# Patient Record
Sex: MALE | Race: WHITE | NOT HISPANIC OR LATINO | Employment: OTHER | URBAN - METROPOLITAN AREA
[De-identification: names, ages, dates, MRNs, and addresses within clinical notes are randomized per-mention and may not be internally consistent; named-entity substitution may affect disease eponyms.]

---

## 2018-12-22 ENCOUNTER — OFFICE VISIT (OUTPATIENT)
Dept: URGENT CARE | Facility: CLINIC | Age: 73
End: 2018-12-22
Payer: MEDICARE

## 2018-12-22 VITALS
HEIGHT: 66 IN | RESPIRATION RATE: 18 BRPM | SYSTOLIC BLOOD PRESSURE: 160 MMHG | TEMPERATURE: 97 F | DIASTOLIC BLOOD PRESSURE: 89 MMHG | HEART RATE: 85 BPM | OXYGEN SATURATION: 97 % | BODY MASS INDEX: 24.91 KG/M2 | WEIGHT: 155 LBS

## 2018-12-22 DIAGNOSIS — J30.9 ALLERGIC RHINITIS, UNSPECIFIED SEASONALITY, UNSPECIFIED TRIGGER: Primary | ICD-10-CM

## 2018-12-22 DIAGNOSIS — J06.9 VIRAL URI WITH COUGH: ICD-10-CM

## 2018-12-22 PROCEDURE — 99203 OFFICE O/P NEW LOW 30 MIN: CPT | Mod: S$GLB,,, | Performed by: NURSE PRACTITIONER

## 2018-12-22 RX ORDER — AMLODIPINE BESYLATE 10 MG/1
TABLET ORAL
COMMUNITY
End: 2020-03-05

## 2018-12-22 RX ORDER — IRBESARTAN 300 MG/1
300 TABLET ORAL DAILY
COMMUNITY
End: 2020-03-05

## 2018-12-22 RX ORDER — FLUTICASONE PROPIONATE 50 MCG
1 SPRAY, SUSPENSION (ML) NASAL DAILY
Qty: 9.9 ML | Refills: 0 | Status: SHIPPED | OUTPATIENT
Start: 2018-12-22 | End: 2022-01-14

## 2018-12-22 RX ORDER — ROSUVASTATIN CALCIUM 10 MG/1
10 TABLET, COATED ORAL DAILY
COMMUNITY
End: 2020-03-05

## 2018-12-22 RX ORDER — TAMSULOSIN HYDROCHLORIDE 0.4 MG/1
CAPSULE ORAL
COMMUNITY
End: 2020-03-05

## 2018-12-22 RX ORDER — FINASTERIDE 5 MG/1
TABLET, FILM COATED ORAL
COMMUNITY
End: 2020-03-05

## 2018-12-22 NOTE — PROGRESS NOTES
"Subjective:       Patient ID: Turner Fernandes is a 73 y.o. male.    Vitals:  height is 5' 6" (1.676 m) and weight is 70.3 kg (155 lb). His temperature is 97 °F (36.1 °C). His blood pressure is 160/89 (abnormal) and his pulse is 85. His respiration is 18 and oxygen saturation is 97%.     Chief Complaint: URI    Pt reports scratchy throat in the morning, pt lots of congestion.       URI    This is a new problem. The current episode started more than 1 month ago. There has been no fever. Associated symptoms include congestion and coughing. Pertinent negatives include no ear pain, nausea, rash, sinus pain, sore throat, vomiting or wheezing. He has tried nothing for the symptoms.       Constitution: Negative for chills, sweating, fatigue and fever.   HENT: Positive for congestion. Negative for ear pain, sinus pain, sinus pressure, sore throat and voice change.    Neck: Negative for painful lymph nodes.   Eyes: Negative for eye redness.   Respiratory: Positive for cough. Negative for chest tightness, sputum production, bloody sputum, COPD, shortness of breath, stridor, wheezing and asthma.    Gastrointestinal: Negative for nausea and vomiting.   Musculoskeletal: Negative for muscle ache.   Skin: Negative for rash.   Allergic/Immunologic: Negative for seasonal allergies and asthma.   Hematologic/Lymphatic: Negative for swollen lymph nodes.       Objective:      Physical Exam   Constitutional: He is oriented to person, place, and time. He appears well-developed and well-nourished. He is cooperative.  Non-toxic appearance. He does not appear ill. No distress.   HENT:   Head: Normocephalic and atraumatic.   Right Ear: Hearing, tympanic membrane, external ear and ear canal normal.   Left Ear: Hearing, tympanic membrane, external ear and ear canal normal.   Nose: Mucosal edema and rhinorrhea present. No nasal deformity. No epistaxis. Right sinus exhibits no maxillary sinus tenderness and no frontal sinus tenderness. Left sinus " exhibits no maxillary sinus tenderness and no frontal sinus tenderness.   Mouth/Throat: Uvula is midline, oropharynx is clear and moist and mucous membranes are normal. No trismus in the jaw. Normal dentition. No uvula swelling. No posterior oropharyngeal erythema.   Eyes: Conjunctivae and lids are normal. No scleral icterus.   Sclera clear bilat   Neck: Trachea normal, full passive range of motion without pain and phonation normal. Neck supple.   Cardiovascular: Normal rate, regular rhythm, normal heart sounds, intact distal pulses and normal pulses.   Pulmonary/Chest: Effort normal and breath sounds normal. No respiratory distress.   Abdominal: Soft. Normal appearance and bowel sounds are normal. He exhibits no distension. There is no tenderness.   Musculoskeletal: Normal range of motion. He exhibits no edema or deformity.   Neurological: He is alert and oriented to person, place, and time. He exhibits normal muscle tone. Coordination normal.   Skin: Skin is warm, dry and intact. He is not diaphoretic. No pallor.   Psychiatric: He has a normal mood and affect. His speech is normal and behavior is normal. Judgment and thought content normal. Cognition and memory are normal.   Nursing note and vitals reviewed.      Assessment:       1. Allergic rhinitis, unspecified seasonality, unspecified trigger    2. Viral URI with cough        Plan:         Allergic rhinitis, unspecified seasonality, unspecified trigger  -     fluticasone (FLONASE) 50 mcg/actuation nasal spray; 1 spray (50 mcg total) by Each Nare route once daily.  Dispense: 9.9 mL; Refill: 0    Viral URI with cough            Patient Instructions     TABLE SPOON OF HONEY FOR SORE THROAT, INCREASE FLUID INTAKE, REST AND FOLLOW UP AS SCHEDULED IN January.       You must understand that you've received an Urgent Care treatment only and that you may be released before all your medical problems are known or treated. You, the patient, will arrange for follow up care  as instructed.  If your condition worsens we recommend that you receive another evaluation at the emergency room immediately or contact your primary medical clinics after hours call service to discuss your concerns.  Please return here or go to the Emergency Department for any concerns or worsening of condition.      Allergic Rhinitis  Allergic rhinitis is an allergic reaction that affects the nose, and often the eyes. Its often known as nasal allergies. Nasal allergies are often due to things in the environment that are breathed in. Depending what you are sensitive to, nasal allergies may occur only during certain seasons. Or they may occur year round. Common indoor allergens include house dust mites, mold, cockroaches, and pet dander. Outdoor allergens include pollen from trees, grasses, and weeds.   Symptoms include a drippy, stuffy, and itchy nose. They also include sneezing and red and itchy eyes. You may feel tired more often. Severe allergies may also affect your breathing and trigger a condition called asthma.   Tests can be done to see what allergens are affecting you. You may be referred to an allergy specialist for testing and further evaluation.  Home care  Your healthcare provider may prescribe medicines to help relieve allergy symptoms. These may include oral medicines, nasal sprays, or eye drops.  Ask your provider for advice on how to avoid substances that you are allergic to. Below are a few tips for each type of allergen.  Pet dander:  · Do not have pets with fur and feathers.  · If you can't avoid having a pet, keep it out of your bedroom and off upholstered furniture.  Pollen:  · When pollen counts are high, keep windows of your car and home closed. If possible, use an air conditioner instead.  · Wear a filter mask when mowing or doing yard work.  House dust mites:  · Wash bedding every week in warm water and detergent and dry on a hot setting.  · Cover the mattress, box spring, and pillows with  allergy covers.   · If possible, sleep in a room with no carpet, curtains, or upholstered furniture.  Cockroaches:  · Store food in sealed containers.  · Remove garbage from the home promptly.  · Fix water leaks  Mold:  · Keep humidity low by using a dehumidifier or air conditioner. Keep the dehumidifier and air conditioner clean and free of mold.  · Clean moldy areas with bleach and water.  In general:  · Vacuum once or twice a week. If possible, use a vacuum with a high-efficiency particulate air (HEPA) filter.  · Do not smoke. Avoid cigarette smoke. Cigarette smoke is an irritant that can make symptoms worse.  Follow-up care  Follow up as advised by the healthcare provider or our staff. If you were referred to an allergy specialist, make this appointment promptly.  When to seek medical advice  Call your healthcare provider right away if the following occur:  · Coughing or wheezing  · Fever greater than 100.4°F (38°C)  · Hives (raised red bumps)  · Continuing symptoms, new symptoms, or worsening symptoms  Call 911 right away if you have:  · Trouble breathing  · Severe swelling of the face or severe itching of the eyes or mouth  Date Last Reviewed: 3/1/2017  © 1494-3052 [employer]. [address] [place] [address], [place] [address]. All rights reserved. This information is not intended as a substitute for professional medical care. Always follow your healthcare professional's instructions.          Self-Care for Sore Throats    Sore throats happen for many reasons, such as colds, allergies, and infections caused by viruses or bacteria. In any case, your throat becomes red and sore. Your goal for self-care is to reduce your discomfort while giving your throat a chance to heal.  Moisten and soothe your throat  Tips include the following:  · Try a sip of water first thing after waking up.  · Keep your throat moist by drinking 6 or more glasses of clear liquids every day.  · Run a cool-air humidifier in your room  overnight.  · Avoid cigarette smoke.   · Suck on throat lozenges, cough drops, hard candy, ice chips, or frozen fruit-juice bars. Use the sugar-free versions if your diet or medical condition requires them.  Gargle to ease irritation  Gargling every hour or 2 can ease irritation. Try gargling with 1 of these solutions:  · 1/4 teaspoon of salt in 1/2 cup of warm water  · An over-the-counter anesthetic gargle  Use medicine for more relief  Over-the-counter medicine can reduce sore throat symptoms. Ask your pharmacist if you have questions about which medicine to use:  · Ease pain with anesthetic sprays. Aspirin or an aspirin substitute also helps. Remember, never give aspirin to anyone 18 or younger, or if you are already taking blood thinners.   · For sore throats caused by allergies, try antihistamines to block the allergic reaction.  · Remember: unless a sore throat is caused by a bacterial infection, antibiotics wont help you.  Prevent future sore throats  Prevention tips include the following:  · Stop smoking or reduce contact with secondhand smoke. Smoke irritates the tender throat lining.  · Limit contact with pets and with allergy-causing substances, such as pollen and mold.  · When youre around someone with a sore throat or cold, wash your hands often to keep viruses or bacteria from spreading.  · Dont strain your vocal cords.  Call your healthcare provider  Contact your healthcare provider if you have:  · A temperature over 101°F (38.3°C)  · White spots on the throat  · Great difficulty swallowing  · Trouble breathing  · A skin rash  · Recent exposure to someone else with strep bacteria  · Severe hoarseness and swollen glands in the neck or jaw   Date Last Reviewed: 8/1/2016  © 7643-9483 [employer]. [address] [place] [address], [place] [address]. All rights reserved. This information is not intended as a substitute for professional medical care. Always follow your healthcare professional's  instructions.

## 2018-12-22 NOTE — PATIENT INSTRUCTIONS
TABLE SPOON OF HONEY FOR SORE THROAT, INCREASE FLUID INTAKE, REST AND FOLLOW UP AS SCHEDULED IN January.       You must understand that you've received an Urgent Care treatment only and that you may be released before all your medical problems are known or treated. You, the patient, will arrange for follow up care as instructed.  If your condition worsens we recommend that you receive another evaluation at the emergency room immediately or contact your primary medical clinics after hours call service to discuss your concerns.  Please return here or go to the Emergency Department for any concerns or worsening of condition.      Allergic Rhinitis  Allergic rhinitis is an allergic reaction that affects the nose, and often the eyes. Its often known as nasal allergies. Nasal allergies are often due to things in the environment that are breathed in. Depending what you are sensitive to, nasal allergies may occur only during certain seasons. Or they may occur year round. Common indoor allergens include house dust mites, mold, cockroaches, and pet dander. Outdoor allergens include pollen from trees, grasses, and weeds.   Symptoms include a drippy, stuffy, and itchy nose. They also include sneezing and red and itchy eyes. You may feel tired more often. Severe allergies may also affect your breathing and trigger a condition called asthma.   Tests can be done to see what allergens are affecting you. You may be referred to an allergy specialist for testing and further evaluation.  Home care  Your healthcare provider may prescribe medicines to help relieve allergy symptoms. These may include oral medicines, nasal sprays, or eye drops.  Ask your provider for advice on how to avoid substances that you are allergic to. Below are a few tips for each type of allergen.  Pet dander:  · Do not have pets with fur and feathers.  · If you can't avoid having a pet, keep it out of your bedroom and off upholstered furniture.  Pollen:  · When  pollen counts are high, keep windows of your car and home closed. If possible, use an air conditioner instead.  · Wear a filter mask when mowing or doing yard work.  House dust mites:  · Wash bedding every week in warm water and detergent and dry on a hot setting.  · Cover the mattress, box spring, and pillows with allergy covers.   · If possible, sleep in a room with no carpet, curtains, or upholstered furniture.  Cockroaches:  · Store food in sealed containers.  · Remove garbage from the home promptly.  · Fix water leaks  Mold:  · Keep humidity low by using a dehumidifier or air conditioner. Keep the dehumidifier and air conditioner clean and free of mold.  · Clean moldy areas with bleach and water.  In general:  · Vacuum once or twice a week. If possible, use a vacuum with a high-efficiency particulate air (HEPA) filter.  · Do not smoke. Avoid cigarette smoke. Cigarette smoke is an irritant that can make symptoms worse.  Follow-up care  Follow up as advised by the healthcare provider or our staff. If you were referred to an allergy specialist, make this appointment promptly.  When to seek medical advice  Call your healthcare provider right away if the following occur:  · Coughing or wheezing  · Fever greater than 100.4°F (38°C)  · Hives (raised red bumps)  · Continuing symptoms, new symptoms, or worsening symptoms  Call 911 right away if you have:  · Trouble breathing  · Severe swelling of the face or severe itching of the eyes or mouth  Date Last Reviewed: 3/1/2017  © 2226-8550 [employer] [address] [place] [address], [place] [address]. All rights reserved. This information is not intended as a substitute for professional medical care. Always follow your healthcare professional's instructions.          Self-Care for Sore Throats    Sore throats happen for many reasons, such as colds, allergies, and infections caused by viruses or bacteria. In any case, your throat becomes red and sore. Your goal for  self-care is to reduce your discomfort while giving your throat a chance to heal.  Moisten and soothe your throat  Tips include the following:  · Try a sip of water first thing after waking up.  · Keep your throat moist by drinking 6 or more glasses of clear liquids every day.  · Run a cool-air humidifier in your room overnight.  · Avoid cigarette smoke.   · Suck on throat lozenges, cough drops, hard candy, ice chips, or frozen fruit-juice bars. Use the sugar-free versions if your diet or medical condition requires them.  Gargle to ease irritation  Gargling every hour or 2 can ease irritation. Try gargling with 1 of these solutions:  · 1/4 teaspoon of salt in 1/2 cup of warm water  · An over-the-counter anesthetic gargle  Use medicine for more relief  Over-the-counter medicine can reduce sore throat symptoms. Ask your pharmacist if you have questions about which medicine to use:  · Ease pain with anesthetic sprays. Aspirin or an aspirin substitute also helps. Remember, never give aspirin to anyone 18 or younger, or if you are already taking blood thinners.   · For sore throats caused by allergies, try antihistamines to block the allergic reaction.  · Remember: unless a sore throat is caused by a bacterial infection, antibiotics wont help you.  Prevent future sore throats  Prevention tips include the following:  · Stop smoking or reduce contact with secondhand smoke. Smoke irritates the tender throat lining.  · Limit contact with pets and with allergy-causing substances, such as pollen and mold.  · When youre around someone with a sore throat or cold, wash your hands often to keep viruses or bacteria from spreading.  · Dont strain your vocal cords.  Call your healthcare provider  Contact your healthcare provider if you have:  · A temperature over 101°F (38.3°C)  · White spots on the throat  · Great difficulty swallowing  · Trouble breathing  · A skin rash  · Recent exposure to someone else with strep  bacteria  · Severe hoarseness and swollen glands in the neck or jaw   Date Last Reviewed: 8/1/2016  © 7852-3708 [employer]. [address] [place] [address], [place] [address]. All rights reserved. This information is not intended as a substitute for professional medical care. Always follow your healthcare professional's instructions.

## 2019-09-10 ENCOUNTER — TELEPHONE (OUTPATIENT)
Dept: FAMILY MEDICINE | Facility: CLINIC | Age: 74
End: 2019-09-10

## 2019-09-10 NOTE — TELEPHONE ENCOUNTER
Tried calling pt, no answer. we did receive his records from [facility], do you want to order any labs for the pt before his appt?

## 2019-09-10 NOTE — TELEPHONE ENCOUNTER
----- Message from Kaley JACKSONYen Morales sent at 9/10/2019 12:53 PM CDT -----  Contact: [phone] self   Pt is calling to see if your office received his medical records from . He is also requesting lab orders for upcoming appt. Please advise

## 2019-09-18 ENCOUNTER — TELEPHONE (OUTPATIENT)
Dept: FAMILY MEDICINE | Facility: CLINIC | Age: 74
End: 2019-09-18

## 2019-09-18 DIAGNOSIS — E78.2 MIXED HYPERLIPIDEMIA: ICD-10-CM

## 2019-09-18 DIAGNOSIS — Z12.5 SCREENING FOR PROSTATE CANCER: Primary | ICD-10-CM

## 2019-09-18 DIAGNOSIS — N40.0 BPH (BENIGN PROSTATIC HYPERPLASIA): Primary | ICD-10-CM

## 2019-09-18 NOTE — TELEPHONE ENCOUNTER
Patient old records from  scanned in, please put in lab order for [facility]-So I can schedule for the [facility]

## 2019-09-18 NOTE — TELEPHONE ENCOUNTER
----- Message from Cuco Horne, Patient Care Assistant sent at 9/18/2019 10:44 AM CDT -----  Contact: Self  Pt is calling because he got some paperwork in the mail that he is confused about.     Pt needs blood work for his appointment that's scheduled for next week.    Pt would like to know if the office received his medical records from [facility].    He can be reached at [phone].    Thank you

## 2019-09-23 ENCOUNTER — LAB VISIT (OUTPATIENT)
Dept: LAB | Facility: HOSPITAL | Age: 74
End: 2019-09-23
Attending: INTERNAL MEDICINE
Payer: MEDICARE

## 2019-09-23 DIAGNOSIS — E78.2 MIXED HYPERLIPIDEMIA: ICD-10-CM

## 2019-09-23 DIAGNOSIS — Z12.5 SCREENING FOR PROSTATE CANCER: ICD-10-CM

## 2019-09-23 LAB
ALBUMIN SERPL BCP-MCNC: 4.3 G/DL (ref 3.5–5.2)
ALP SERPL-CCNC: 54 U/L (ref 55–135)
ALT SERPL W/O P-5'-P-CCNC: 19 U/L (ref 10–44)
ANION GAP SERPL CALC-SCNC: 7 MMOL/L (ref 8–16)
AST SERPL-CCNC: 23 U/L (ref 10–40)
BILIRUB SERPL-MCNC: 1.4 MG/DL (ref 0.1–1)
BUN SERPL-MCNC: 17 MG/DL (ref 8–23)
CALCIUM SERPL-MCNC: 10.7 MG/DL (ref 8.7–10.5)
CHLORIDE SERPL-SCNC: 102 MMOL/L (ref 95–110)
CHOLEST SERPL-MCNC: 244 MG/DL (ref 120–199)
CHOLEST/HDLC SERPL: 2.5 {RATIO} (ref 2–5)
CO2 SERPL-SCNC: 31 MMOL/L (ref 23–29)
COMPLEXED PSA SERPL-MCNC: 7.2 NG/ML (ref 0–4)
CREAT SERPL-MCNC: 0.9 MG/DL (ref 0.5–1.4)
EST. GFR  (AFRICAN AMERICAN): >60 ML/MIN/1.73 M^2
EST. GFR  (NON AFRICAN AMERICAN): >60 ML/MIN/1.73 M^2
GLUCOSE SERPL-MCNC: 107 MG/DL (ref 70–110)
HDLC SERPL-MCNC: 99 MG/DL (ref 40–75)
HDLC SERPL: 40.6 % (ref 20–50)
LDLC SERPL CALC-MCNC: 125.8 MG/DL (ref 63–159)
NONHDLC SERPL-MCNC: 145 MG/DL
POTASSIUM SERPL-SCNC: 4.4 MMOL/L (ref 3.5–5.1)
PROT SERPL-MCNC: 7.4 G/DL (ref 6–8.4)
SODIUM SERPL-SCNC: 140 MMOL/L (ref 136–145)
TRIGL SERPL-MCNC: 96 MG/DL (ref 30–150)

## 2019-09-23 PROCEDURE — 80061 LIPID PANEL: CPT | Mod: HCNC

## 2019-09-23 PROCEDURE — 84153 ASSAY OF PSA TOTAL: CPT | Mod: HCNC

## 2019-09-23 PROCEDURE — 36415 COLL VENOUS BLD VENIPUNCTURE: CPT | Mod: HCNC,PO

## 2019-09-23 PROCEDURE — 80053 COMPREHEN METABOLIC PANEL: CPT | Mod: HCNC

## 2019-09-25 ENCOUNTER — OFFICE VISIT (OUTPATIENT)
Dept: FAMILY MEDICINE | Facility: CLINIC | Age: 74
End: 2019-09-25
Payer: MEDICARE

## 2019-09-25 VITALS
TEMPERATURE: 99 F | HEART RATE: 99 BPM | BODY MASS INDEX: 26.05 KG/M2 | SYSTOLIC BLOOD PRESSURE: 120 MMHG | DIASTOLIC BLOOD PRESSURE: 80 MMHG | WEIGHT: 162.06 LBS | RESPIRATION RATE: 18 BRPM | HEIGHT: 66 IN

## 2019-09-25 DIAGNOSIS — N40.0 BENIGN PROSTATIC HYPERPLASIA WITHOUT LOWER URINARY TRACT SYMPTOMS: ICD-10-CM

## 2019-09-25 DIAGNOSIS — I10 BENIGN ESSENTIAL HYPERTENSION: Primary | ICD-10-CM

## 2019-09-25 DIAGNOSIS — E78.2 MIXED HYPERLIPIDEMIA: ICD-10-CM

## 2019-09-25 DIAGNOSIS — Z23 NEED FOR INFLUENZA VACCINATION: ICD-10-CM

## 2019-09-25 DIAGNOSIS — R97.20 ELEVATED PSA, LESS THAN 10 NG/ML: ICD-10-CM

## 2019-09-25 PROCEDURE — 1101F PT FALLS ASSESS-DOCD LE1/YR: CPT | Mod: HCNC,CPTII,S$GLB, | Performed by: INTERNAL MEDICINE

## 2019-09-25 PROCEDURE — 99499 UNLISTED E&M SERVICE: CPT | Mod: S$GLB,,, | Performed by: INTERNAL MEDICINE

## 2019-09-25 PROCEDURE — G0008 FLU VACCINE - HIGH DOSE (65+) PRESERVATIVE FREE IM: ICD-10-PCS | Mod: HCNC,S$GLB,, | Performed by: INTERNAL MEDICINE

## 2019-09-25 PROCEDURE — G0008 ADMIN INFLUENZA VIRUS VAC: HCPCS | Mod: HCNC,S$GLB,, | Performed by: INTERNAL MEDICINE

## 2019-09-25 PROCEDURE — 99499 RISK ADDL DX/OHS AUDIT: ICD-10-PCS | Mod: S$GLB,,, | Performed by: INTERNAL MEDICINE

## 2019-09-25 PROCEDURE — 99213 PR OFFICE/OUTPT VISIT, EST, LEVL III, 20-29 MIN: ICD-10-PCS | Mod: 25,HCNC,S$GLB, | Performed by: INTERNAL MEDICINE

## 2019-09-25 PROCEDURE — 90662 FLU VACCINE - HIGH DOSE (65+) PRESERVATIVE FREE IM: ICD-10-PCS | Mod: HCNC,S$GLB,, | Performed by: INTERNAL MEDICINE

## 2019-09-25 PROCEDURE — 99213 OFFICE O/P EST LOW 20 MIN: CPT | Mod: 25,HCNC,S$GLB, | Performed by: INTERNAL MEDICINE

## 2019-09-25 PROCEDURE — 99999 PR PBB SHADOW E&M-EST. PATIENT-LVL III: ICD-10-PCS | Mod: PBBFAC,HCNC,, | Performed by: INTERNAL MEDICINE

## 2019-09-25 PROCEDURE — 99999 PR PBB SHADOW E&M-EST. PATIENT-LVL III: CPT | Mod: PBBFAC,HCNC,, | Performed by: INTERNAL MEDICINE

## 2019-09-25 PROCEDURE — 90662 IIV NO PRSV INCREASED AG IM: CPT | Mod: HCNC,S$GLB,, | Performed by: INTERNAL MEDICINE

## 2019-09-25 PROCEDURE — 1101F PR PT FALLS ASSESS DOC 0-1 FALLS W/OUT INJ PAST YR: ICD-10-PCS | Mod: HCNC,CPTII,S$GLB, | Performed by: INTERNAL MEDICINE

## 2019-09-25 RX ORDER — AMOXICILLIN 500 MG
CAPSULE ORAL DAILY
COMMUNITY
End: 2022-01-14

## 2019-09-25 RX ORDER — OMEPRAZOLE 20 MG/1
20 CAPSULE, DELAYED RELEASE ORAL DAILY
COMMUNITY
End: 2020-03-05

## 2019-09-25 NOTE — PROGRESS NOTES
[facility] Note    Chief Complaint      Chief Complaint   Patient presents with    Follow-up       History of Present Illness      Turner Fernandes is a 73 y.o. male who presents today for   Chief Complaint   Patient presents with    Follow-up   .  Patient comes to appointment here for f/u visit with me for chronicities as below . He is an avid exerciser with [facility] . Hen is [place] twith all vaccines needs flu vaccine this visit. All labs reviewd with patient and he has appt with dr malik for elevated psa .    HPI    No problem-specific Assessment & Plan notes found for this encounter.       Problem List Items Addressed This Visit        Cardiac/Vascular    Benign essential hypertension - Primary    Overview     Well controlled on current reigmen          Mixed hyperlipidemia    Overview     crestor working well cont same             Renal/    Elevated PSA, less than 10 ng/ml    Overview     Will be seeing dr malik urology for eval          Benign prostatic hyperplasia without lower urinary tract symptoms    Overview     Cont Flomax  and proscar            Other Visit Diagnoses     Need for influenza vaccination               Past Medical History:  Past Medical History:   Diagnosis Date    Hyperlipidemia     Hypertension        Past Surgical History:  Past Surgical History:   Procedure Laterality Date    SHOULDER SURGERY         Family History:  family history includes Heart attack in his mother.     Social History:  Social History     Socioeconomic History    Marital status:      Spouse name: Not on file    Number of children: Not on file    Years of education: Not on file    Highest education level: Not on file   Occupational History    Not on file   Social Needs    Financial resource strain: Not on file    Food insecurity:     Worry: Not on file     Inability: Not on file    Transportation needs:     Medical: Not on file     Non-medical:  Not on file   Tobacco Use    Smoking status: Never Smoker    Smokeless tobacco: Never Used   Substance and Sexual Activity    Alcohol use: Yes     Comment: social    Drug use: Not on file    Sexual activity: Not on file   Lifestyle    Physical activity:     Days per week: Not on file     Minutes per session: Not on file    Stress: Not on file   Relationships    Social connections:     Talks on phone: Not on file     Gets together: Not on file     Attends Judaism service: Not on file     Active member of club or organization: Not on file     Attends meetings of clubs or organizations: Not on file     Relationship status: Not on file   Other Topics Concern    Not on file   Social History Narrative    Not on file       Review of Systems:   Review of Systems   Constitutional: Negative for fever and weight loss.   HENT: Negative for congestion, hearing loss and sore throat.    Eyes: Negative for blurred vision.   Respiratory: Negative for cough and shortness of breath.    Cardiovascular: Negative for chest pain, palpitations, claudication and leg swelling.   Gastrointestinal: Negative for abdominal pain, constipation, diarrhea, heartburn, nausea and vomiting.   Genitourinary: Negative for dysuria.   Musculoskeletal: Positive for back pain and myalgias.   Skin: Negative for rash.   Neurological: Negative for focal weakness and headaches.   Psychiatric/Behavioral: Negative for depression, memory loss and suicidal ideas. The patient is not nervous/anxious.         Medications:  Outpatient Encounter Medications as of 9/25/2019   Medication Sig Dispense Refill    amLODIPine (NORVASC) 10 MG tablet amlodipine 10 mg tablet      aspirin-calcium carbonate 81 mg-300 mg calcium(777 mg) Tab aspirin 81 mg tablet   Take by oral route.      finasteride (PROSCAR) 5 mg tablet finasteride 5 mg tablet      fish oil-omega-3 fatty acids 300-1,000 mg capsule Take by mouth once daily.      irbesartan (AVAPRO) 300 MG tablet Take  "300 mg by mouth once daily.       multivitamin capsule Take 1 capsule by mouth once daily.      omeprazole (PRILOSEC) 20 MG capsule Take 20 mg by mouth once daily.      rosuvastatin (CRESTOR) 10 MG tablet Take 10 mg by mouth once daily.       tamsulosin (FLOMAX) 0.4 mg Cap tamsulosin 0.4 mg capsule      vit A/vit C/vit E/zinc/copper (ICAPS AREDS ORAL) Take by mouth.      fluticasone (FLONASE) 50 mcg/actuation nasal spray 1 spray (50 mcg total) by Each Nare route once daily. (Patient not taking: Reported on 9/25/2019) 9.9 mL 0     No facility-administered encounter medications on file as of 9/25/2019.        Allergies:  Review of patient's allergies indicates:   Allergen Reactions    Penicillins     Sulfa (sulfonamide antibiotics)          Physical Exam      Vitals:    09/25/19 1114   BP: 120/80   Pulse: 99   Resp: 18   Temp: 98.5 °F (36.9 °C)        Vital Signs  Temp: 98.5 °F (36.9 °C)  Temp src: Oral  Pulse: 99  Resp: 18  BP: 120/80  BP Location: Right arm  Patient Position: Sitting  Pain Score: 0-No pain  Height and Weight  Height: 5' 6" (167.6 cm)  Weight: 73.5 kg (162 lb 0.6 oz)  BSA (Calculated - sq m): 1.85 sq meters  BMI (Calculated): 26.2  Weight in (lb) to have BMI = 25: 154.6]     Body mass index is 26.15 kg/m².    Physical Exam   Constitutional: He is oriented to person, place, and time. He appears well-developed and well-nourished.   HENT:   Head: Normocephalic.   Eyes: Pupils are equal, round, and reactive to light.   Neck: Normal range of motion. No thyromegaly present.   Cardiovascular: Normal rate and regular rhythm. Exam reveals no gallop and no friction rub.   No murmur heard.  Pulmonary/Chest: Effort normal and breath sounds normal.   Abdominal: Soft. Bowel sounds are normal.   Musculoskeletal: Normal range of motion. He exhibits no edema.   Neurological: He is alert and oriented to person, place, and time. No sensory deficit.   Skin: Skin is warm and dry.   Psychiatric: He has a normal " mood and affect. His behavior is normal.        Laboratory:  CBC:  No results for input(s): WBC, RBC, HGB, HCT, PLT, MCV, MCH, MCHC in the last 2160 hours.  CMP:  Recent Labs   Lab Result Units 09/23/19  0923   Glucose mg/dL 107   Calcium mg/dL 10.7*   Albumin g/dL 4.3   Total Protein g/dL 7.4   Sodium mmol/L 140   Potassium mmol/L 4.4   CO2 mmol/L 31*   Chloride mmol/L 102   BUN, Bld mg/dL 17   Alkaline Phosphatase U/L 54*   ALT U/L 19   AST U/L 23   Total Bilirubin mg/dL 1.4*     URINALYSIS:  No results for input(s): COLORU, CLARITYU, SPECGRAV, PHUR, PROTEINUA, GLUCOSEU, BILIRUBINCON, BLOODU, WBCU, RBCU, BACTERIA, MUCUS, NITRITE, LEUKOCYTESUR, UROBILINOGEN, HYALINECASTS in the last 2160 hours.   LIPIDS:  Recent Labs   Lab Result Units 09/23/19  0923   HDL mg/dL 99*   Cholesterol mg/dL 244*   Triglycerides mg/dL 96   LDL Cholesterol mg/dL 125.8   Hdl/Cholesterol Ratio % 40.6   Non-HDL Cholesterol mg/dL 145   Total Cholesterol/HDL Ratio  2.5     TSH:  No results for input(s): TSH in the last 2160 hours.  A1C:  No results for input(s): HGBA1C in the last 2160 hours.    Radiology:        Assessment:     Turner Fernandes is a 73 y.o.male with:    Benign essential hypertension    Elevated PSA, less than 10 ng/ml    Mixed hyperlipidemia    Benign prostatic hyperplasia without lower urinary tract symptoms    Need for influenza vaccination  -     Influenza - High Dose (65+) (PF) (IM)                Plan:     As above, continue current medications and maintain follow up with specialists.  Return to clinic in 6  months.    [facility] Dantagn[facility]

## 2020-03-05 RX ORDER — OMEPRAZOLE 20 MG/1
CAPSULE, DELAYED RELEASE ORAL
Qty: 90 CAPSULE | Refills: 3 | Status: SHIPPED | OUTPATIENT
Start: 2020-03-05 | End: 2021-02-12

## 2020-03-05 RX ORDER — TAMSULOSIN HYDROCHLORIDE 0.4 MG/1
CAPSULE ORAL
Qty: 90 CAPSULE | Refills: 3 | Status: SHIPPED | OUTPATIENT
Start: 2020-03-05 | End: 2021-02-12

## 2020-03-05 RX ORDER — FINASTERIDE 5 MG/1
TABLET, FILM COATED ORAL
Qty: 90 TABLET | Refills: 3 | Status: SHIPPED | OUTPATIENT
Start: 2020-03-05 | End: 2021-02-12

## 2020-03-05 RX ORDER — AMLODIPINE BESYLATE 10 MG/1
TABLET ORAL
Qty: 90 TABLET | Refills: 3 | Status: SHIPPED | OUTPATIENT
Start: 2020-03-05 | End: 2021-02-12

## 2020-03-05 RX ORDER — ROSUVASTATIN CALCIUM 10 MG/1
TABLET, COATED ORAL
Qty: 90 TABLET | Refills: 3 | Status: SHIPPED | OUTPATIENT
Start: 2020-03-05 | End: 2021-02-12

## 2020-03-05 RX ORDER — IRBESARTAN 300 MG/1
TABLET ORAL
Qty: 90 TABLET | Refills: 3 | Status: SHIPPED | OUTPATIENT
Start: 2020-03-05 | End: 2021-02-12

## 2020-07-23 ENCOUNTER — TELEPHONE (OUTPATIENT)
Dept: FAMILY MEDICINE | Facility: CLINIC | Age: 75
End: 2020-07-23

## 2020-07-23 ENCOUNTER — OFFICE VISIT (OUTPATIENT)
Dept: FAMILY MEDICINE | Facility: CLINIC | Age: 75
End: 2020-07-23
Payer: MEDICARE

## 2020-07-23 VITALS
BODY MASS INDEX: 25.3 KG/M2 | HEIGHT: 66 IN | HEART RATE: 88 BPM | TEMPERATURE: 98 F | OXYGEN SATURATION: 97 % | DIASTOLIC BLOOD PRESSURE: 76 MMHG | SYSTOLIC BLOOD PRESSURE: 138 MMHG | WEIGHT: 157.44 LBS | RESPIRATION RATE: 16 BRPM

## 2020-07-23 DIAGNOSIS — N52.9 ERECTILE DYSFUNCTION, UNSPECIFIED ERECTILE DYSFUNCTION TYPE: ICD-10-CM

## 2020-07-23 DIAGNOSIS — E78.2 MIXED HYPERLIPIDEMIA: ICD-10-CM

## 2020-07-23 DIAGNOSIS — I10 BENIGN ESSENTIAL HYPERTENSION: Primary | ICD-10-CM

## 2020-07-23 DIAGNOSIS — N40.0 BENIGN PROSTATIC HYPERPLASIA WITHOUT LOWER URINARY TRACT SYMPTOMS: ICD-10-CM

## 2020-07-23 DIAGNOSIS — Z85.46 HISTORY OF PROSTATE CANCER: ICD-10-CM

## 2020-07-23 DIAGNOSIS — Z23 NEED FOR TETANUS BOOSTER: ICD-10-CM

## 2020-07-23 DIAGNOSIS — Z11.59 NEED FOR HEPATITIS C SCREENING TEST: ICD-10-CM

## 2020-07-23 DIAGNOSIS — Z12.5 PROSTATE CANCER SCREENING: ICD-10-CM

## 2020-07-23 PROCEDURE — 3075F PR MOST RECENT SYSTOLIC BLOOD PRESS GE 130-139MM HG: ICD-10-PCS | Mod: HCNC,CPTII,S$GLB, | Performed by: INTERNAL MEDICINE

## 2020-07-23 PROCEDURE — 3078F DIAST BP <80 MM HG: CPT | Mod: HCNC,CPTII,S$GLB, | Performed by: INTERNAL MEDICINE

## 2020-07-23 PROCEDURE — 99214 PR OFFICE/OUTPT VISIT, EST, LEVL IV, 30-39 MIN: ICD-10-PCS | Mod: HCNC,S$GLB,25, | Performed by: INTERNAL MEDICINE

## 2020-07-23 PROCEDURE — 3075F SYST BP GE 130 - 139MM HG: CPT | Mod: HCNC,CPTII,S$GLB, | Performed by: INTERNAL MEDICINE

## 2020-07-23 PROCEDURE — 99999 PR PBB SHADOW E&M-EST. PATIENT-LVL IV: ICD-10-PCS | Mod: PBBFAC,HCNC,, | Performed by: INTERNAL MEDICINE

## 2020-07-23 PROCEDURE — 90714 TD VACCINE GREATER THAN OR EQUAL TO 7YO PRESERVATIVE FREE IM: ICD-10-PCS | Mod: HCNC,S$GLB,, | Performed by: INTERNAL MEDICINE

## 2020-07-23 PROCEDURE — 90471 IMMUNIZATION ADMIN: CPT | Mod: HCNC,S$GLB,, | Performed by: INTERNAL MEDICINE

## 2020-07-23 PROCEDURE — 1159F PR MEDICATION LIST DOCUMENTED IN MEDICAL RECORD: ICD-10-PCS | Mod: HCNC,S$GLB,, | Performed by: INTERNAL MEDICINE

## 2020-07-23 PROCEDURE — 99999 PR PBB SHADOW E&M-EST. PATIENT-LVL IV: CPT | Mod: PBBFAC,HCNC,, | Performed by: INTERNAL MEDICINE

## 2020-07-23 PROCEDURE — 90714 TD VACC NO PRESV 7 YRS+ IM: CPT | Mod: HCNC,S$GLB,, | Performed by: INTERNAL MEDICINE

## 2020-07-23 PROCEDURE — 1101F PT FALLS ASSESS-DOCD LE1/YR: CPT | Mod: HCNC,CPTII,S$GLB, | Performed by: INTERNAL MEDICINE

## 2020-07-23 PROCEDURE — 1159F MED LIST DOCD IN RCRD: CPT | Mod: HCNC,S$GLB,, | Performed by: INTERNAL MEDICINE

## 2020-07-23 PROCEDURE — 3078F PR MOST RECENT DIASTOLIC BLOOD PRESSURE < 80 MM HG: ICD-10-PCS | Mod: HCNC,CPTII,S$GLB, | Performed by: INTERNAL MEDICINE

## 2020-07-23 PROCEDURE — 90471 TD VACCINE GREATER THAN OR EQUAL TO 7YO PRESERVATIVE FREE IM: ICD-10-PCS | Mod: HCNC,S$GLB,, | Performed by: INTERNAL MEDICINE

## 2020-07-23 PROCEDURE — 1101F PR PT FALLS ASSESS DOC 0-1 FALLS W/OUT INJ PAST YR: ICD-10-PCS | Mod: HCNC,CPTII,S$GLB, | Performed by: INTERNAL MEDICINE

## 2020-07-23 PROCEDURE — 99214 OFFICE O/P EST MOD 30 MIN: CPT | Mod: HCNC,S$GLB,25, | Performed by: INTERNAL MEDICINE

## 2020-07-23 PROCEDURE — 99499 UNLISTED E&M SERVICE: CPT | Mod: S$GLB,,, | Performed by: INTERNAL MEDICINE

## 2020-07-23 PROCEDURE — 99499 RISK ADDL DX/OHS AUDIT: ICD-10-PCS | Mod: S$GLB,,, | Performed by: INTERNAL MEDICINE

## 2020-07-23 RX ORDER — SILDENAFIL 100 MG/1
100 TABLET, FILM COATED ORAL DAILY PRN
Qty: 10 TABLET | Refills: 1 | Status: SHIPPED | OUTPATIENT
Start: 2020-07-23 | End: 2021-07-14 | Stop reason: SDUPTHER

## 2020-07-23 NOTE — TELEPHONE ENCOUNTER
----- Message from Chalino Hernández sent at 7/23/2020  1:24 PM CDT -----  Regarding: Bloodwork orders  Type:  Needs Medical Advice    Who Called:  patient  Would the patient rather a call back or a response via [facility]?  Call back  Best Call Back Number:  [phone]  Additional Information:  wants to know if he has to do any blood work

## 2020-07-23 NOTE — PROGRESS NOTES
[facility] Note    Chief Complaint      Chief Complaint   Patient presents with    Follow-up       History of Present Illness      Turner Fernandes is a 74 y.o. male who presents today for   Chief Complaint   Patient presents with    Follow-up   .  Patient comes to appointment here for 6m f/u for chronic issues as discussed in detail below . He needs labs but not until 10/2020 will order for then . He is comliant with all meds . He needs td booster today . He is eating healthy diet and is getting regular exercise with walking     HPI    No problem-specific Assessment & Plan notes found for this encounter.       Problem List Items Addressed This Visit        Cardiac/Vascular    Benign essential hypertension - Primary    Overview     Well controlled on current reigmen cont same          Mixed hyperlipidemia    Overview     crestor working well cont same             Renal/    Benign prostatic hyperplasia without lower urinary tract symptoms    Overview     Cont Flomax  and proscar          Erectile dysfunction    Overview     viagra 100 mg trial he will call back with update             Oncology    History of prostate cancer    Overview     Cont current surveillance with dr malik            Other Visit Diagnoses     Prostate cancer screening        Need for hepatitis C screening test               Past Medical History:  Past Medical History:   Diagnosis Date    Cancer     Hyperlipidemia     Hypertension        Past Surgical History:  Past Surgical History:   Procedure Laterality Date    SHOULDER SURGERY         Family History:  family history includes Heart attack in his mother.     Social History:  Social History     Socioeconomic History    Marital status:      Spouse name: Not on file    Number of children: Not on file    Years of education: Not on file    Highest education level: Not on file   Occupational History    Not on file   Social Needs    Financial resource  strain: Not on file    Food insecurity     Worry: Not on file     Inability: Not on file    Transportation needs     Medical: Not on file     Non-medical: Not on file   Tobacco Use    Smoking status: Never Smoker    Smokeless tobacco: Never Used   Substance and Sexual Activity    Alcohol use: Yes     Comment: social    Drug use: Not on file    Sexual activity: Not on file   Lifestyle    Physical activity     Days per week: Not on file     Minutes per session: Not on file    Stress: Not on file   Relationships    Social connections     Talks on phone: Not on file     Gets together: Not on file     Attends Voodoo service: Not on file     Active member of club or organization: Not on file     Attends meetings of clubs or organizations: Not on file     Relationship status: Not on file   Other Topics Concern    Not on file   Social History Narrative    Not on file       Review of Systems:   ROS     Medications:  Outpatient Encounter Medications as of 7/23/2020   Medication Sig Dispense Refill    amLODIPine (NORVASC) 10 MG tablet TAKE 1 TABLET EVERY DAY 90 tablet 3    aspirin-calcium carbonate 81 mg-300 mg calcium(777 mg) Tab aspirin 81 mg tablet   Take by oral route.      finasteride (PROSCAR) 5 mg tablet TAKE 1 TABLET EVERY DAY 90 tablet 3    irbesartan (AVAPRO) 300 MG tablet TAKE 1 TABLET EVERY DAY 90 tablet 3    multivitamin capsule Take 1 capsule by mouth once daily.      omeprazole (PRILOSEC) 20 MG capsule TAKE 1 CAPSULE EVERY DAY 90 capsule 3    rosuvastatin (CRESTOR) 10 MG tablet TAKE 1 TABLET EVERY DAY 90 tablet 3    tamsulosin (FLOMAX) 0.4 mg Cap TAKE 1 CAPSULE EVERY DAY 90 capsule 3    vit A/vit C/vit E/zinc/copper (ICAPS AREDS ORAL) Take by mouth.      fish oil-omega-3 fatty acids 300-1,000 mg capsule Take by mouth once daily.      fluticasone (FLONASE) 50 mcg/actuation nasal spray 1 spray (50 mcg total) by Each Nare route once daily. (Patient not taking: Reported on 9/25/2019) 9.9 mL  "0     No facility-administered encounter medications on file as of 7/23/2020.        Allergies:  Review of patient's allergies indicates:   Allergen Reactions    Penicillins     Sulfa (sulfonamide antibiotics)     Sulfur Hives         Physical Exam      Vitals:    07/23/20 1024   BP: 138/76   Pulse: 88   Resp: 16   Temp: 97.9 °F (36.6 °C)        Vital Signs  Temp: 97.9 °F (36.6 °C)  Temp src: Oral  Pulse: 88  Resp: 16  SpO2: 97 %  BP: 138/76  BP Location: Right arm  Patient Position: Sitting  Height and Weight  Height: 5' 6" (167.6 cm)  Weight: 71.4 kg (157 lb 6.5 oz)  BSA (Calculated - sq m): 1.82 sq meters  BMI (Calculated): 25.4  Weight in (lb) to have BMI = 25: 154.6]     Body mass index is 25.41 kg/m².    Physical Exam     Laboratory:  CBC:  No results for input(s): WBC, RBC, HGB, HCT, PLT, MCV, MCH, MCHC in the last 2160 hours.  CMP:  No results for input(s): GLU, CALCIUM, ALBUMIN, PROT, NA, K, CO2, CL, BUN, ALKPHOS, ALT, AST, BILITOT in the last 2160 hours.    Invalid input(s): CREATININ  URINALYSIS:  No results for input(s): COLORU, CLARITYU, SPECGRAV, PHUR, PROTEINUA, GLUCOSEU, BILIRUBINCON, BLOODU, WBCU, RBCU, BACTERIA, MUCUS, NITRITE, LEUKOCYTESUR, UROBILINOGEN, HYALINECASTS in the last 2160 hours.   LIPIDS:  No results for input(s): TSH, HDL, CHOL, TRIG, LDLCALC, CHOLHDL, NONHDLCHOL, TOTALCHOLEST in the last 2160 hours.  TSH:  No results for input(s): TSH in the last 2160 hours.  A1C:  No results for input(s): HGBA1C in the last 2160 hours.    Radiology:        Assessment:     Turner Fernandes is a 74 y.o.male with:    Benign essential hypertension  -     CBC auto differential; Future; Expected date: 10/01/2020    History of prostate cancer    Benign prostatic hyperplasia without lower urinary tract symptoms    Erectile dysfunction, unspecified erectile dysfunction type    Mixed hyperlipidemia  -     Comprehensive metabolic panel; Future; Expected date: 10/01/2020  -     Lipid Panel; Future; Expected date: " 10/01/2020    Prostate cancer screening  -     PSA, Screening; Future; Expected date: 10/01/2020    Need for hepatitis C screening test  -     Hepatitis C Antibody; Future; Expected date: 10/01/2020                Plan:     Problem List Items Addressed This Visit        Cardiac/Vascular    Benign essential hypertension - Primary    Overview     Well controlled on current reigmen cont same          Mixed hyperlipidemia    Overview     crestor working well cont same             Renal/    Benign prostatic hyperplasia without lower urinary tract symptoms    Overview     Cont Flomax  and proscar          Erectile dysfunction    Overview     viagra 100 mg trial he will call back with update             Oncology    History of prostate cancer    Overview     Cont current surveillance with dr malik            Other Visit Diagnoses     Prostate cancer screening        Need for hepatitis C screening test              As above, continue current medications and maintain follow up with specialists.  Return to clinic in 6months.      [facility] W Dantagn[facility]

## 2020-11-04 ENCOUNTER — TELEPHONE (OUTPATIENT)
Dept: FAMILY MEDICINE | Facility: CLINIC | Age: 75
End: 2020-11-04

## 2020-11-04 LAB
ALBUMIN: 4.7 GRAM/DL (ref 3.5–5)
ALP SERPL-CCNC: 63 UNIT/L (ref 38–126)
ALT SERPL W P-5'-P-CCNC: 18 UNIT/L (ref 7–56)
ANION GAP SERPL CALC-SCNC: 17 MEQ/L (ref 9–18)
AST SERPL-CCNC: 23 UNIT/L (ref 7–40)
BASOPHILS ABSOLUTE COUNT: 0.1 K/UL (ref 0–0.2)
BASOPHILS NFR BLD: 0.9 % (ref 0–2)
BILIRUB SERPL-MCNC: 1.2 MG/DL (ref 0–1.2)
BUN BLD-MCNC: 13 MG/DL (ref 7–21)
BUN/CREAT SERPL: 14 RATIO (ref 6–22)
CALC OSMOLALITY: 280 MOSM/KG (ref 275–295)
CALCIUM SERPL-MCNC: 10.5 MG/DL (ref 8.5–10.3)
CHLORIDE SERPL-SCNC: 98 MEQ/L (ref 98–107)
CHOL/HDLC RATIO: 2
CHOLEST SERPL-MSCNC: 270 MG/DL (ref 100–200)
CO2 SERPL-SCNC: 29 MEQ/L (ref 21–31)
CREAT SERPL-MCNC: 0.9 MG/DL (ref 0.7–1.2)
DIFFERENTIAL TYPE: ABNORMAL
EOSINOPHIL NFR BLD: 1.2 % (ref 0–4)
EOSINOPHILS ABSOLUTE COUNT: 0.1 K/UL (ref 0–0.7)
ERYTHROCYTE [DISTWIDTH] IN BLOOD BY AUTOMATED COUNT: 13.3 GRAM/DL (ref 12–15.3)
GFR: 85.3 ML/MIN/1.73M2
GLUCOSE SERPL-MCNC: 113 MG/DL (ref 70–100)
HCT VFR BLD AUTO: 44.2 % (ref 40–52)
HDLC SERPL-MCNC: 142 MG/DL (ref 40–75)
HGB BLD-MCNC: 15.1 GRAM/DL (ref 13.6–17.5)
LDLC SERPL CALC-MCNC: 118 MG/DL (ref 0–130)
LYMPHOCYTES %: 16.2 % (ref 15–45)
LYMPHOCYTES ABSOLUTE COUNT: 1.2 K/UL (ref 1–4.2)
MCH RBC QN AUTO: 34 PICOGRAM (ref 27–33)
MCHC RBC AUTO-ENTMCNC: 34.1 GRAM/DL (ref 32–36)
MCV RBC AUTO: 99.6 FEMTOLITER (ref 80–94)
MONOCYTES %: 8.5 % (ref 3–13)
MONOCYTES ABSOLUTE COUNT: 0.6 K/UL (ref 0.1–0.8)
NEUTROPHILS ABSOLUTE COUNT: 5.3 K/UL (ref 2.1–7.6)
NEUTROPHILS RELATIVE PERCENT: 73.2 % (ref 32–80)
NONHDLC SERPL-MCNC: 128 MG/DL (ref 60–125)
PLATELET # BLD AUTO: 219 K/UL (ref 150–350)
PMV BLD AUTO: 9 FEMTOLITER (ref 7–10.2)
POTASSIUM SERPL-SCNC: 3.7 MEQ/L (ref 3.5–5)
PROSTATE SPECIFIC ANTIGEN, TOTAL: 3.57 NANOGRAM/ML (ref 0–3.99)
RBC # BLD AUTO: 4.44 MIL/UL (ref 4.45–5.9)
SODIUM BLD-SCNC: 140 MEQ/L (ref 135–145)
TOTAL PROTEIN: 7.2 GRAM/DL (ref 6.3–8.2)
TRIGL SERPL-MCNC: 62 MG/DL (ref 30–150)
WBC # BLD AUTO: 7.3 K/UL (ref 4.5–11)

## 2020-11-04 NOTE — TELEPHONE ENCOUNTER
----- Message from Joanne Barnes sent at 11/4/2020  2:43 PM CST -----  Contact: [phone]  Type:  Needs Medical Advice    Who Called: PT  Reason for Call: pt is returning a call from the Office regarding his blood work  Would the patient rather a call back or a response via [facility]? Call back  Best Call Back Number: [phone]

## 2020-11-05 LAB
HCV AB S/CO SERPL IA: 0.01
HCV AB SERPL QL IA: NORMAL

## 2021-01-13 ENCOUNTER — OFFICE VISIT (OUTPATIENT)
Dept: FAMILY MEDICINE | Facility: CLINIC | Age: 76
End: 2021-01-13
Payer: MEDICARE

## 2021-01-13 VITALS
HEART RATE: 99 BPM | RESPIRATION RATE: 18 BRPM | TEMPERATURE: 99 F | BODY MASS INDEX: 26.01 KG/M2 | WEIGHT: 161.81 LBS | OXYGEN SATURATION: 97 % | HEIGHT: 66 IN | DIASTOLIC BLOOD PRESSURE: 84 MMHG | SYSTOLIC BLOOD PRESSURE: 120 MMHG

## 2021-01-13 DIAGNOSIS — I10 BENIGN ESSENTIAL HYPERTENSION: Primary | ICD-10-CM

## 2021-01-13 DIAGNOSIS — Z85.46 HISTORY OF PROSTATE CANCER: ICD-10-CM

## 2021-01-13 DIAGNOSIS — R97.20 ELEVATED PSA, LESS THAN 10 NG/ML: ICD-10-CM

## 2021-01-13 DIAGNOSIS — N40.0 BENIGN PROSTATIC HYPERPLASIA WITHOUT LOWER URINARY TRACT SYMPTOMS: ICD-10-CM

## 2021-01-13 DIAGNOSIS — E78.2 MIXED HYPERLIPIDEMIA: ICD-10-CM

## 2021-01-13 PROCEDURE — 1101F PT FALLS ASSESS-DOCD LE1/YR: CPT | Mod: CPTII,S$GLB,, | Performed by: INTERNAL MEDICINE

## 2021-01-13 PROCEDURE — 1126F AMNT PAIN NOTED NONE PRSNT: CPT | Mod: S$GLB,,, | Performed by: INTERNAL MEDICINE

## 2021-01-13 PROCEDURE — 3079F PR MOST RECENT DIASTOLIC BLOOD PRESSURE 80-89 MM HG: ICD-10-PCS | Mod: CPTII,S$GLB,, | Performed by: INTERNAL MEDICINE

## 2021-01-13 PROCEDURE — 3288F FALL RISK ASSESSMENT DOCD: CPT | Mod: CPTII,S$GLB,, | Performed by: INTERNAL MEDICINE

## 2021-01-13 PROCEDURE — 3074F SYST BP LT 130 MM HG: CPT | Mod: CPTII,S$GLB,, | Performed by: INTERNAL MEDICINE

## 2021-01-13 PROCEDURE — 99214 OFFICE O/P EST MOD 30 MIN: CPT | Mod: S$GLB,,, | Performed by: INTERNAL MEDICINE

## 2021-01-13 PROCEDURE — 3074F PR MOST RECENT SYSTOLIC BLOOD PRESSURE < 130 MM HG: ICD-10-PCS | Mod: CPTII,S$GLB,, | Performed by: INTERNAL MEDICINE

## 2021-01-13 PROCEDURE — 1126F PR PAIN SEVERITY QUANTIFIED, NO PAIN PRESENT: ICD-10-PCS | Mod: S$GLB,,, | Performed by: INTERNAL MEDICINE

## 2021-01-13 PROCEDURE — 1101F PR PT FALLS ASSESS DOC 0-1 FALLS W/OUT INJ PAST YR: ICD-10-PCS | Mod: CPTII,S$GLB,, | Performed by: INTERNAL MEDICINE

## 2021-01-13 PROCEDURE — 3288F PR FALLS RISK ASSESSMENT DOCUMENTED: ICD-10-PCS | Mod: CPTII,S$GLB,, | Performed by: INTERNAL MEDICINE

## 2021-01-13 PROCEDURE — 99999 PR PBB SHADOW E&M-EST. PATIENT-LVL IV: ICD-10-PCS | Mod: PBBFAC,,, | Performed by: INTERNAL MEDICINE

## 2021-01-13 PROCEDURE — 3079F DIAST BP 80-89 MM HG: CPT | Mod: CPTII,S$GLB,, | Performed by: INTERNAL MEDICINE

## 2021-01-13 PROCEDURE — 99214 PR OFFICE/OUTPT VISIT, EST, LEVL IV, 30-39 MIN: ICD-10-PCS | Mod: S$GLB,,, | Performed by: INTERNAL MEDICINE

## 2021-01-13 PROCEDURE — 1159F PR MEDICATION LIST DOCUMENTED IN MEDICAL RECORD: ICD-10-PCS | Mod: S$GLB,,, | Performed by: INTERNAL MEDICINE

## 2021-01-13 PROCEDURE — 99999 PR PBB SHADOW E&M-EST. PATIENT-LVL IV: CPT | Mod: PBBFAC,,, | Performed by: INTERNAL MEDICINE

## 2021-01-13 PROCEDURE — 1159F MED LIST DOCD IN RCRD: CPT | Mod: S$GLB,,, | Performed by: INTERNAL MEDICINE

## 2021-03-09 ENCOUNTER — PES CALL (OUTPATIENT)
Dept: ADMINISTRATIVE | Facility: CLINIC | Age: 76
End: 2021-03-09

## 2021-07-14 ENCOUNTER — OFFICE VISIT (OUTPATIENT)
Dept: FAMILY MEDICINE | Facility: CLINIC | Age: 76
End: 2021-07-14
Payer: MEDICARE

## 2021-07-14 VITALS
WEIGHT: 163.13 LBS | HEART RATE: 83 BPM | HEIGHT: 66 IN | SYSTOLIC BLOOD PRESSURE: 134 MMHG | OXYGEN SATURATION: 97 % | RESPIRATION RATE: 18 BRPM | BODY MASS INDEX: 26.22 KG/M2 | TEMPERATURE: 98 F | DIASTOLIC BLOOD PRESSURE: 76 MMHG

## 2021-07-14 DIAGNOSIS — I10 BENIGN ESSENTIAL HYPERTENSION: ICD-10-CM

## 2021-07-14 DIAGNOSIS — N40.0 BENIGN PROSTATIC HYPERPLASIA WITHOUT LOWER URINARY TRACT SYMPTOMS: ICD-10-CM

## 2021-07-14 DIAGNOSIS — N52.9 ERECTILE DYSFUNCTION, UNSPECIFIED ERECTILE DYSFUNCTION TYPE: ICD-10-CM

## 2021-07-14 DIAGNOSIS — E78.2 MIXED HYPERLIPIDEMIA: ICD-10-CM

## 2021-07-14 DIAGNOSIS — Z85.46 HISTORY OF PROSTATE CANCER: Primary | ICD-10-CM

## 2021-07-14 PROCEDURE — 1126F PR PAIN SEVERITY QUANTIFIED, NO PAIN PRESENT: ICD-10-PCS | Mod: S$GLB,,, | Performed by: INTERNAL MEDICINE

## 2021-07-14 PROCEDURE — 99214 PR OFFICE/OUTPT VISIT, EST, LEVL IV, 30-39 MIN: ICD-10-PCS | Mod: S$GLB,,, | Performed by: INTERNAL MEDICINE

## 2021-07-14 PROCEDURE — 3075F PR MOST RECENT SYSTOLIC BLOOD PRESS GE 130-139MM HG: ICD-10-PCS | Mod: CPTII,S$GLB,, | Performed by: INTERNAL MEDICINE

## 2021-07-14 PROCEDURE — 3075F SYST BP GE 130 - 139MM HG: CPT | Mod: CPTII,S$GLB,, | Performed by: INTERNAL MEDICINE

## 2021-07-14 PROCEDURE — 99499 UNLISTED E&M SERVICE: CPT | Mod: HCNC,S$GLB,, | Performed by: INTERNAL MEDICINE

## 2021-07-14 PROCEDURE — 99499 RISK ADDL DX/OHS AUDIT: ICD-10-PCS | Mod: HCNC,S$GLB,, | Performed by: INTERNAL MEDICINE

## 2021-07-14 PROCEDURE — 99999 PR PBB SHADOW E&M-EST. PATIENT-LVL IV: CPT | Mod: PBBFAC,,, | Performed by: INTERNAL MEDICINE

## 2021-07-14 PROCEDURE — 1159F PR MEDICATION LIST DOCUMENTED IN MEDICAL RECORD: ICD-10-PCS | Mod: S$GLB,,, | Performed by: INTERNAL MEDICINE

## 2021-07-14 PROCEDURE — 1159F MED LIST DOCD IN RCRD: CPT | Mod: S$GLB,,, | Performed by: INTERNAL MEDICINE

## 2021-07-14 PROCEDURE — 1101F PR PT FALLS ASSESS DOC 0-1 FALLS W/OUT INJ PAST YR: ICD-10-PCS | Mod: CPTII,S$GLB,, | Performed by: INTERNAL MEDICINE

## 2021-07-14 PROCEDURE — 3288F PR FALLS RISK ASSESSMENT DOCUMENTED: ICD-10-PCS | Mod: CPTII,S$GLB,, | Performed by: INTERNAL MEDICINE

## 2021-07-14 PROCEDURE — 1101F PT FALLS ASSESS-DOCD LE1/YR: CPT | Mod: CPTII,S$GLB,, | Performed by: INTERNAL MEDICINE

## 2021-07-14 PROCEDURE — 3078F PR MOST RECENT DIASTOLIC BLOOD PRESSURE < 80 MM HG: ICD-10-PCS | Mod: CPTII,S$GLB,, | Performed by: INTERNAL MEDICINE

## 2021-07-14 PROCEDURE — 99999 PR PBB SHADOW E&M-EST. PATIENT-LVL IV: ICD-10-PCS | Mod: PBBFAC,,, | Performed by: INTERNAL MEDICINE

## 2021-07-14 PROCEDURE — 3078F DIAST BP <80 MM HG: CPT | Mod: CPTII,S$GLB,, | Performed by: INTERNAL MEDICINE

## 2021-07-14 PROCEDURE — 1126F AMNT PAIN NOTED NONE PRSNT: CPT | Mod: S$GLB,,, | Performed by: INTERNAL MEDICINE

## 2021-07-14 PROCEDURE — 3288F FALL RISK ASSESSMENT DOCD: CPT | Mod: CPTII,S$GLB,, | Performed by: INTERNAL MEDICINE

## 2021-07-14 PROCEDURE — 99214 OFFICE O/P EST MOD 30 MIN: CPT | Mod: S$GLB,,, | Performed by: INTERNAL MEDICINE

## 2021-07-14 RX ORDER — SILDENAFIL 100 MG/1
100 TABLET, FILM COATED ORAL DAILY PRN
Qty: 10 TABLET | Refills: 1 | Status: SHIPPED | OUTPATIENT
Start: 2021-07-14 | End: 2024-02-01

## 2021-10-30 ENCOUNTER — CLINICAL SUPPORT (OUTPATIENT)
Dept: URGENT CARE | Facility: CLINIC | Age: 76
End: 2021-10-30
Payer: MEDICARE

## 2021-10-30 DIAGNOSIS — Z11.52 ENCOUNTER FOR SCREENING FOR COVID-19: Primary | ICD-10-CM

## 2021-10-30 LAB
CTP QC/QA: YES
SARS-COV-2 RDRP RESP QL NAA+PROBE: NEGATIVE

## 2021-10-30 PROCEDURE — 99211 PR OFFICE/OUTPT VISIT, EST, LEVL I: ICD-10-PCS | Mod: S$GLB,CS,, | Performed by: NURSE PRACTITIONER

## 2021-10-30 PROCEDURE — U0002 COVID-19 LAB TEST NON-CDC: HCPCS | Mod: QW,S$GLB,, | Performed by: NURSE PRACTITIONER

## 2021-10-30 PROCEDURE — 99211 OFF/OP EST MAY X REQ PHY/QHP: CPT | Mod: S$GLB,CS,, | Performed by: NURSE PRACTITIONER

## 2021-10-30 PROCEDURE — U0002: ICD-10-PCS | Mod: QW,S$GLB,, | Performed by: NURSE PRACTITIONER

## 2022-01-14 ENCOUNTER — OFFICE VISIT (OUTPATIENT)
Dept: INTERNAL MEDICINE | Facility: CLINIC | Age: 77
End: 2022-01-14
Payer: MEDICARE

## 2022-01-14 VITALS
HEIGHT: 66 IN | HEART RATE: 93 BPM | RESPIRATION RATE: 18 BRPM | SYSTOLIC BLOOD PRESSURE: 132 MMHG | WEIGHT: 163 LBS | OXYGEN SATURATION: 96 % | TEMPERATURE: 98 F | BODY MASS INDEX: 26.2 KG/M2 | DIASTOLIC BLOOD PRESSURE: 84 MMHG

## 2022-01-14 DIAGNOSIS — I10 BENIGN ESSENTIAL HYPERTENSION: Primary | ICD-10-CM

## 2022-01-14 DIAGNOSIS — Z23 NEED FOR INFLUENZA VACCINATION: ICD-10-CM

## 2022-01-14 DIAGNOSIS — E78.2 MIXED HYPERLIPIDEMIA: ICD-10-CM

## 2022-01-14 DIAGNOSIS — Z85.46 HISTORY OF PROSTATE CANCER: ICD-10-CM

## 2022-01-14 PROCEDURE — 3075F SYST BP GE 130 - 139MM HG: CPT | Mod: HCNC,CPTII,S$GLB, | Performed by: INTERNAL MEDICINE

## 2022-01-14 PROCEDURE — 90694 FLU VACCINE - QUADRIVALENT - ADJUVANTED: ICD-10-PCS | Mod: HCNC,S$GLB,, | Performed by: INTERNAL MEDICINE

## 2022-01-14 PROCEDURE — 3079F DIAST BP 80-89 MM HG: CPT | Mod: HCNC,CPTII,S$GLB, | Performed by: INTERNAL MEDICINE

## 2022-01-14 PROCEDURE — 1159F MED LIST DOCD IN RCRD: CPT | Mod: HCNC,CPTII,S$GLB, | Performed by: INTERNAL MEDICINE

## 2022-01-14 PROCEDURE — 1101F PR PT FALLS ASSESS DOC 0-1 FALLS W/OUT INJ PAST YR: ICD-10-PCS | Mod: HCNC,CPTII,S$GLB, | Performed by: INTERNAL MEDICINE

## 2022-01-14 PROCEDURE — G0008 FLU VACCINE - QUADRIVALENT - ADJUVANTED: ICD-10-PCS | Mod: HCNC,S$GLB,, | Performed by: INTERNAL MEDICINE

## 2022-01-14 PROCEDURE — 1160F PR REVIEW ALL MEDS BY PRESCRIBER/CLIN PHARMACIST DOCUMENTED: ICD-10-PCS | Mod: HCNC,CPTII,S$GLB, | Performed by: INTERNAL MEDICINE

## 2022-01-14 PROCEDURE — 99999 PR PBB SHADOW E&M-EST. PATIENT-LVL V: ICD-10-PCS | Mod: PBBFAC,HCNC,, | Performed by: INTERNAL MEDICINE

## 2022-01-14 PROCEDURE — 1126F AMNT PAIN NOTED NONE PRSNT: CPT | Mod: HCNC,CPTII,S$GLB, | Performed by: INTERNAL MEDICINE

## 2022-01-14 PROCEDURE — 1160F RVW MEDS BY RX/DR IN RCRD: CPT | Mod: HCNC,CPTII,S$GLB, | Performed by: INTERNAL MEDICINE

## 2022-01-14 PROCEDURE — 3288F FALL RISK ASSESSMENT DOCD: CPT | Mod: HCNC,CPTII,S$GLB, | Performed by: INTERNAL MEDICINE

## 2022-01-14 PROCEDURE — 3288F PR FALLS RISK ASSESSMENT DOCUMENTED: ICD-10-PCS | Mod: HCNC,CPTII,S$GLB, | Performed by: INTERNAL MEDICINE

## 2022-01-14 PROCEDURE — 1159F PR MEDICATION LIST DOCUMENTED IN MEDICAL RECORD: ICD-10-PCS | Mod: HCNC,CPTII,S$GLB, | Performed by: INTERNAL MEDICINE

## 2022-01-14 PROCEDURE — G0008 ADMIN INFLUENZA VIRUS VAC: HCPCS | Mod: HCNC,S$GLB,, | Performed by: INTERNAL MEDICINE

## 2022-01-14 PROCEDURE — 90694 VACC AIIV4 NO PRSRV 0.5ML IM: CPT | Mod: HCNC,S$GLB,, | Performed by: INTERNAL MEDICINE

## 2022-01-14 PROCEDURE — 99214 PR OFFICE/OUTPT VISIT, EST, LEVL IV, 30-39 MIN: ICD-10-PCS | Mod: HCNC,S$GLB,, | Performed by: INTERNAL MEDICINE

## 2022-01-14 PROCEDURE — 99214 OFFICE O/P EST MOD 30 MIN: CPT | Mod: HCNC,S$GLB,, | Performed by: INTERNAL MEDICINE

## 2022-01-14 PROCEDURE — 99999 PR PBB SHADOW E&M-EST. PATIENT-LVL V: CPT | Mod: PBBFAC,HCNC,, | Performed by: INTERNAL MEDICINE

## 2022-01-14 PROCEDURE — 3079F PR MOST RECENT DIASTOLIC BLOOD PRESSURE 80-89 MM HG: ICD-10-PCS | Mod: HCNC,CPTII,S$GLB, | Performed by: INTERNAL MEDICINE

## 2022-01-14 PROCEDURE — 1126F PR PAIN SEVERITY QUANTIFIED, NO PAIN PRESENT: ICD-10-PCS | Mod: HCNC,CPTII,S$GLB, | Performed by: INTERNAL MEDICINE

## 2022-01-14 PROCEDURE — 3075F PR MOST RECENT SYSTOLIC BLOOD PRESS GE 130-139MM HG: ICD-10-PCS | Mod: HCNC,CPTII,S$GLB, | Performed by: INTERNAL MEDICINE

## 2022-01-14 PROCEDURE — 1101F PT FALLS ASSESS-DOCD LE1/YR: CPT | Mod: HCNC,CPTII,S$GLB, | Performed by: INTERNAL MEDICINE

## 2022-01-14 RX ORDER — HYDROCHLOROTHIAZIDE 12.5 MG/1
12.5 TABLET ORAL DAILY
Qty: 90 TABLET | Refills: 3 | Status: SHIPPED | OUTPATIENT
Start: 2022-01-14 | End: 2022-12-13

## 2022-01-14 NOTE — PROGRESS NOTES
[facility] Note    Chief Complaint      Chief Complaint   Patient presents with    Follow-up     6 M       History of Present Illness      Turner Fernandes is a 76 y.o. male who presents today for   Chief Complaint   Patient presents with    Follow-up     6 M   .  Patient comes to appointment here for 6m checkup for chronic issues as discussed in detail below . He has been fully vaccinated for covid . He remains active with exercise and is eating healthy diet . He requests influenza vaccine . He is due for labs this visit . He will be getting psa soon with his urologist .     HPI    No problem-specific Assessment & Plan notes found for this encounter.       Problem List Items Addressed This Visit        Cardiac/Vascular    Benign essential hypertension - Primary    Overview     Some of home bp readings in the 140 range will add hctz 12.5 mg           Mixed hyperlipidemia    Overview     crestor working well cont same labs in 6 m             Oncology    History of prostate cancer    Overview     Cont current surveillance with dr malik            Other Visit Diagnoses     Need for influenza vaccination               Past Medical History:  Past Medical History:   Diagnosis Date    Cancer     Hyperlipidemia     Hypertension        Past Surgical History:  Past Surgical History:   Procedure Laterality Date    SHOULDER SURGERY         Family History:  family history includes Heart attack in his mother.     Social History:  Social History     Socioeconomic History    Marital status:    Tobacco Use    Smoking status: Never Smoker    Smokeless tobacco: Never Used   Substance and Sexual Activity    Alcohol use: Yes     Comment: social       Review of Systems:   Review of Systems   Constitutional: Negative for fever and weight loss.   HENT: Negative for congestion, hearing loss and sore throat.    Eyes: Negative for blurred vision.   Respiratory: Negative for cough and shortness of  breath.    Cardiovascular: Negative for chest pain, palpitations, claudication and leg swelling.   Gastrointestinal: Negative for abdominal pain, constipation, diarrhea and heartburn.   Genitourinary: Negative for dysuria.   Musculoskeletal: Negative for back pain and myalgias.   Skin: Negative for rash.   Neurological: Negative for focal weakness and headaches.   Psychiatric/Behavioral: Negative for depression and suicidal ideas. The patient is not nervous/anxious.         Medications:  Outpatient Encounter Medications as of 1/14/2022   Medication Sig Dispense Refill    amLODIPine (NORVASC) 10 MG tablet TAKE 1 TABLET EVERY DAY 90 tablet 3    aspirin-calcium carbonate 81 mg-300 mg calcium(777 mg) Tab aspirin 81 mg tablet   Take by oral route.      finasteride (PROSCAR) 5 mg tablet TAKE 1 TABLET EVERY DAY 90 tablet 3    irbesartan (AVAPRO) 300 MG tablet TAKE 1 TABLET EVERY DAY 90 tablet 3    multivitamin capsule Take 1 capsule by mouth once daily.      omeprazole (PRILOSEC) 20 MG capsule TAKE 1 CAPSULE EVERY DAY 90 capsule 3    rosuvastatin (CRESTOR) 10 MG tablet TAKE 1 TABLET EVERY DAY 90 tablet 3    sildenafiL (VIAGRA) 100 MG tablet Take 1 tablet (100 mg total) by mouth daily as needed for Erectile Dysfunction. 10 tablet 1    tamsulosin (FLOMAX) 0.4 mg Cap TAKE 1 CAPSULE EVERY DAY 90 capsule 3    vit A/vit C/vit E/zinc/copper (ICAPS AREDS ORAL) Take by mouth.      hydroCHLOROthiazide (HYDRODIURIL) 12.5 MG Tab Take 1 tablet (12.5 mg total) by mouth once daily. 90 tablet 3    [DISCONTINUED] fish oil-omega-3 fatty acids 300-1,000 mg capsule Take by mouth once daily.      [DISCONTINUED] fluticasone (FLONASE) 50 mcg/actuation nasal spray 1 spray (50 mcg total) by Each Nare route once daily. (Patient not taking: No sig reported) 9.9 mL 0     No facility-administered encounter medications on file as of 1/14/2022.       Allergies:  Review of patient's allergies indicates:   Allergen Reactions    Penicillins   "   Sulfa (sulfonamide antibiotics)     Sulfur Hives         Physical Exam      Vitals:    01/14/22 1041   BP: 132/84   Pulse:    Resp:    Temp:         Vital Signs  Temp: 98.4 °F (36.9 °C)  Temp src: Oral  Pulse: 93  Resp: 18  SpO2: 96 %  BP: 132/84  BP Location: Right arm  Patient Position: Sitting  Pain Score: 0-No pain  Height and Weight  Height: 5' 6" (167.6 cm)  Weight: 73.9 kg (163 lb 0.5 oz)  BSA (Calculated - sq m): 1.86 sq meters  BMI (Calculated): 26.3  Weight in (lb) to have BMI = 25: 154.6]     Body mass index is 26.31 kg/m².    Physical Exam  Constitutional:       Appearance: He is well-developed and well-nourished.   HENT:      Head: Normocephalic.   Eyes:      Pupils: Pupils are equal, round, and reactive to light.   Neck:      Thyroid: No thyromegaly.   Cardiovascular:      Rate and Rhythm: Normal rate and regular rhythm.      Heart sounds: No murmur heard.  No friction rub. No gallop.    Pulmonary:      Effort: Pulmonary effort is normal.      Breath sounds: Normal breath sounds.   Abdominal:      General: Bowel sounds are normal.      Palpations: Abdomen is soft.   Musculoskeletal:         General: No edema. Normal range of motion.      Cervical back: Normal range of motion.   Skin:     General: Skin is warm and dry.   Neurological:      Mental Status: He is alert and oriented to person, place, and time.      Sensory: No sensory deficit.   Psychiatric:         Mood and Affect: Mood and affect normal.         Behavior: Behavior normal.          Laboratory:  CBC:  No results for input(s): WBC, RBC, HGB, HCT, PLT, MCV, MCH, MCHC in the last 2160 hours.  CMP:  No results for input(s): GLU, CALCIUM, ALBUMIN, PROT, NA, K, CO2, CL, BUN, ALKPHOS, ALT, AST, BILITOT in the last 2160 hours.    Invalid input(s): CREATININ  URINALYSIS:  No results for input(s): COLORU, CLARITYU, SPECGRAV, PHUR, PROTEINUA, GLUCOSEU, BILIRUBINCON, BLOODU, WBCU, RBCU, BACTERIA, MUCUS, NITRITE, LEUKOCYTESUR, UROBILINOGEN, " HYALINECASTS in the last 2160 hours.   LIPIDS:  No results for input(s): TSH, HDL, CHOL, TRIG, LDLCALC, CHOLHDL, NONHDLCHOL, TOTALCHOLEST in the last 2160 hours.  TSH:  No results for input(s): TSH in the last 2160 hours.  A1C:  No results for input(s): HGBA1C in the last 2160 hours.    Radiology:        Assessment:     Turner Fernandes is a 76 y.o.male with:    Benign essential hypertension  -     hydroCHLOROthiazide (HYDRODIURIL) 12.5 MG Tab; Take 1 tablet (12.5 mg total) by mouth once daily.  Dispense: 90 tablet; Refill: 3    Mixed hyperlipidemia    History of prostate cancer    Need for influenza vaccination  -     Influenza (FLUAD) - Quadrivalent (Adjuvanted) *Preferred* (65+) (PF)                Plan:     Problem List Items Addressed This Visit        Cardiac/Vascular    Benign essential hypertension - Primary    Overview     Some of home bp readings in the 140 range will add hctz 12.5 mg           Mixed hyperlipidemia    Overview     crestor working well cont same labs in 6 m             Oncology    History of prostate cancer    Overview     Cont current surveillance with dr malik            Other Visit Diagnoses     Need for influenza vaccination              As above, continue current medications and maintain follow up with specialists.  Return to clinic in 6 months.      [facility] W Dantagn[facility]

## 2022-01-17 NOTE — TELEPHONE ENCOUNTER
Care Due:                  Date            Visit Type   Department     Provider  --------------------------------------------------------------------------------                                ESTABLISHED                              PATIENT      [facility]  Last Visit: 01-      [facility]           Tera Gomez                                           [facility]  Next Visit: 07-      [facility]           Tera Gomez                                                            Last  Test          Frequency    Reason                     Performed    Due Date  --------------------------------------------------------------------------------    CMP.........  12 months..  hydroCHLOROthiazide,       Not Found    Overdue                             irbesartan, rosuvastatin.    Lipid Panel.  12 months..  rosuvastatin.............  Not Found    Overdue    Powered by [employer]. Reference number: [id number].   1/17/2022 3:50:25 PM CST

## 2022-01-21 RX ORDER — IRBESARTAN 300 MG/1
TABLET ORAL
Qty: 90 TABLET | Refills: 3 | Status: SHIPPED | OUTPATIENT
Start: 2022-01-21 | End: 2023-02-13

## 2022-01-21 RX ORDER — ROSUVASTATIN CALCIUM 10 MG/1
TABLET, COATED ORAL
Qty: 90 TABLET | Refills: 3 | Status: SHIPPED | OUTPATIENT
Start: 2022-01-21 | End: 2023-02-13

## 2022-01-23 NOTE — TELEPHONE ENCOUNTER
No new care gaps identified.  Powered by [employer]. Reference number: [id number].   1/23/2022 3:37:40 PM CST

## 2022-01-24 RX ORDER — AMLODIPINE BESYLATE 10 MG/1
TABLET ORAL
Qty: 90 TABLET | Refills: 3 | Status: SHIPPED | OUTPATIENT
Start: 2022-01-24 | End: 2023-02-13

## 2022-01-24 RX ORDER — OMEPRAZOLE 20 MG/1
CAPSULE, DELAYED RELEASE ORAL
Qty: 90 CAPSULE | Refills: 3 | Status: SHIPPED | OUTPATIENT
Start: 2022-01-24 | End: 2023-02-13

## 2022-01-24 RX ORDER — TAMSULOSIN HYDROCHLORIDE 0.4 MG/1
CAPSULE ORAL
Qty: 90 CAPSULE | Refills: 3 | Status: SHIPPED | OUTPATIENT
Start: 2022-01-24 | End: 2023-02-13

## 2022-01-24 RX ORDER — FINASTERIDE 5 MG/1
TABLET, FILM COATED ORAL
Qty: 90 TABLET | Refills: 3 | Status: SHIPPED | OUTPATIENT
Start: 2022-01-24 | End: 2023-02-13

## 2022-01-24 NOTE — TELEPHONE ENCOUNTER
----- Message from Nabil Salas sent at 1/24/2022 12:48 PM CST -----  Name Of Caller: Turner        Provider Name: Tera Gomez        Does patient feel the need to be seen today? no        Relationship to the Pt?: patient        Contact Preference?: [phone]        What is the nature of the call?: Patient states that [facility] [phone] has been trying to get in contact with someone in the office but have not been successful, patient would like to speak with someone in the office regarding this matter

## 2022-04-29 ENCOUNTER — TELEPHONE (OUTPATIENT)
Dept: INTERNAL MEDICINE | Facility: CLINIC | Age: 77
End: 2022-04-29
Payer: MEDICARE

## 2022-04-29 NOTE — TELEPHONE ENCOUNTER
Patient states he feels very lightheaded  when he take his hydrochlorothiazide should she stop taking the med or cut it in half ?

## 2022-04-29 NOTE — TELEPHONE ENCOUNTER
[facility]     MA Rooming Progress Note    1. Are you taking medication as instructed? Yes  2. Do you have any medication questions/concerns since your last visit?  No  3. Did the patient bring a friend or family member with them into the room? No  1. If so, did the patient give explicit permission for the practitioner to discuss their healthcare in front of that friend/family member? N/A  4. Is this patient is on Opioid therapy No              Pt advised

## 2022-05-02 ENCOUNTER — TELEPHONE (OUTPATIENT)
Dept: INTERNAL MEDICINE | Facility: CLINIC | Age: 77
End: 2022-05-02
Payer: MEDICARE

## 2022-05-02 NOTE — TELEPHONE ENCOUNTER
----- Message from Nunu Su sent at 5/2/2022  8:56 AM CDT -----  Who Called: Patient    What is the reqeust in detail: Requesting call back if necessary about his bp readings for the weekend. They are as follows:    Sat AM - 142/91 and 89  Sat PM - Forgot    Sun AM - 148/94 and 88  Sun PM - 119/63 and 116    Mon AM - 144/83 and 93    Can the clinic reply by [facility]? No    Best Call Back Number: [phone]    Additional Information:

## 2022-05-06 ENCOUNTER — TELEPHONE (OUTPATIENT)
Dept: INTERNAL MEDICINE | Facility: CLINIC | Age: 77
End: 2022-05-06
Payer: MEDICARE

## 2022-05-06 NOTE — TELEPHONE ENCOUNTER
----- Message from Yong Brooke sent at 5/6/2022  3:25 PM CDT -----  Regarding: BP Reading  Contact: [phone]  BP Reading    Who Called: Turner  Reason: BP Reading  Call Back Number:[phone]  Additional Information: The pt was told to call in on Friday( 05/06/2022) with his BP readings from Tuesday - Thursday.     Tuesday, May 3rd- morning 139/81 P 82    evening 137/78 P 106  Wednesday, May 4th- morning 143/85 P 86  evening 131/70 P 103  Thursday, May 5th- morning 139/78 P 87 evening 125/72 P 106

## 2022-05-30 ENCOUNTER — PATIENT MESSAGE (OUTPATIENT)
Dept: INTERNAL MEDICINE | Facility: CLINIC | Age: 77
End: 2022-05-30
Payer: MEDICARE

## 2022-07-18 ENCOUNTER — OFFICE VISIT (OUTPATIENT)
Dept: INTERNAL MEDICINE | Facility: CLINIC | Age: 77
End: 2022-07-18
Payer: MEDICARE

## 2022-07-18 VITALS
HEIGHT: 66 IN | RESPIRATION RATE: 18 BRPM | DIASTOLIC BLOOD PRESSURE: 70 MMHG | WEIGHT: 165.81 LBS | HEART RATE: 108 BPM | BODY MASS INDEX: 26.65 KG/M2 | SYSTOLIC BLOOD PRESSURE: 110 MMHG | TEMPERATURE: 98 F | OXYGEN SATURATION: 95 %

## 2022-07-18 DIAGNOSIS — N40.0 BENIGN PROSTATIC HYPERPLASIA WITHOUT LOWER URINARY TRACT SYMPTOMS: ICD-10-CM

## 2022-07-18 DIAGNOSIS — Z85.46 HISTORY OF PROSTATE CANCER: ICD-10-CM

## 2022-07-18 DIAGNOSIS — I10 BENIGN ESSENTIAL HYPERTENSION: Primary | ICD-10-CM

## 2022-07-18 DIAGNOSIS — E78.2 MIXED HYPERLIPIDEMIA: ICD-10-CM

## 2022-07-18 PROCEDURE — 3288F PR FALLS RISK ASSESSMENT DOCUMENTED: ICD-10-PCS | Mod: CPTII,S$GLB,, | Performed by: INTERNAL MEDICINE

## 2022-07-18 PROCEDURE — 99214 OFFICE O/P EST MOD 30 MIN: CPT | Mod: S$GLB,,, | Performed by: INTERNAL MEDICINE

## 2022-07-18 PROCEDURE — 1126F PR PAIN SEVERITY QUANTIFIED, NO PAIN PRESENT: ICD-10-PCS | Mod: CPTII,S$GLB,, | Performed by: INTERNAL MEDICINE

## 2022-07-18 PROCEDURE — 1101F PT FALLS ASSESS-DOCD LE1/YR: CPT | Mod: CPTII,S$GLB,, | Performed by: INTERNAL MEDICINE

## 2022-07-18 PROCEDURE — 99999 PR PBB SHADOW E&M-EST. PATIENT-LVL IV: ICD-10-PCS | Mod: PBBFAC,,, | Performed by: INTERNAL MEDICINE

## 2022-07-18 PROCEDURE — 1160F RVW MEDS BY RX/DR IN RCRD: CPT | Mod: CPTII,S$GLB,, | Performed by: INTERNAL MEDICINE

## 2022-07-18 PROCEDURE — 99999 PR PBB SHADOW E&M-EST. PATIENT-LVL IV: CPT | Mod: PBBFAC,,, | Performed by: INTERNAL MEDICINE

## 2022-07-18 PROCEDURE — 3288F FALL RISK ASSESSMENT DOCD: CPT | Mod: CPTII,S$GLB,, | Performed by: INTERNAL MEDICINE

## 2022-07-18 PROCEDURE — 99214 PR OFFICE/OUTPT VISIT, EST, LEVL IV, 30-39 MIN: ICD-10-PCS | Mod: S$GLB,,, | Performed by: INTERNAL MEDICINE

## 2022-07-18 PROCEDURE — 1160F PR REVIEW ALL MEDS BY PRESCRIBER/CLIN PHARMACIST DOCUMENTED: ICD-10-PCS | Mod: CPTII,S$GLB,, | Performed by: INTERNAL MEDICINE

## 2022-07-18 PROCEDURE — 3078F DIAST BP <80 MM HG: CPT | Mod: CPTII,S$GLB,, | Performed by: INTERNAL MEDICINE

## 2022-07-18 PROCEDURE — 1159F PR MEDICATION LIST DOCUMENTED IN MEDICAL RECORD: ICD-10-PCS | Mod: CPTII,S$GLB,, | Performed by: INTERNAL MEDICINE

## 2022-07-18 PROCEDURE — 3078F PR MOST RECENT DIASTOLIC BLOOD PRESSURE < 80 MM HG: ICD-10-PCS | Mod: CPTII,S$GLB,, | Performed by: INTERNAL MEDICINE

## 2022-07-18 PROCEDURE — 1126F AMNT PAIN NOTED NONE PRSNT: CPT | Mod: CPTII,S$GLB,, | Performed by: INTERNAL MEDICINE

## 2022-07-18 PROCEDURE — 3074F PR MOST RECENT SYSTOLIC BLOOD PRESSURE < 130 MM HG: ICD-10-PCS | Mod: CPTII,S$GLB,, | Performed by: INTERNAL MEDICINE

## 2022-07-18 PROCEDURE — 3074F SYST BP LT 130 MM HG: CPT | Mod: CPTII,S$GLB,, | Performed by: INTERNAL MEDICINE

## 2022-07-18 PROCEDURE — 1101F PR PT FALLS ASSESS DOC 0-1 FALLS W/OUT INJ PAST YR: ICD-10-PCS | Mod: CPTII,S$GLB,, | Performed by: INTERNAL MEDICINE

## 2022-07-18 PROCEDURE — 1159F MED LIST DOCD IN RCRD: CPT | Mod: CPTII,S$GLB,, | Performed by: INTERNAL MEDICINE

## 2022-07-18 NOTE — PROGRESS NOTES
[facility] Note    Chief Complaint      Chief Complaint   Patient presents with    Follow-up     6m       History of Present Illness      Turner Fernandes is a 76 y.o. male who presents today for   Chief Complaint   Patient presents with    Follow-up     6m   .  Patient comes to appointment here for 6 m checkup for chronic issues as below . He is due for full labs witht this visit . He has had no med changes since last visit . Has had covid vaccine x 3 . He is active with walking regularly for exercise .     HPI    No problem-specific Assessment & Plan notes found for this encounter.       Problem List Items Addressed This Visit        Cardiac/Vascular    Benign essential hypertension    Overview     bp well controlled on current regimen            Mixed hyperlipidemia    Overview     crestor working well cont same labs with this visit               Renal/    Benign prostatic hyperplasia without lower urinary tract symptoms    Overview     Cont Flomax  and proscar seeing dr malik              Oncology    History of prostate cancer - Primary    Overview     Cont current surveillance with dr malik                   Past Medical History:  Past Medical History:   Diagnosis Date    Cancer     Hyperlipidemia     Hypertension        Past Surgical History:  Past Surgical History:   Procedure Laterality Date    SHOULDER SURGERY         Family History:  family history includes Heart attack in his mother.     Social History:  Social History     Socioeconomic History    Marital status:    Tobacco Use    Smoking status: Never Smoker    Smokeless tobacco: Never Used   Substance and Sexual Activity    Alcohol use: Yes     Comment: social       Review of Systems:   Review of Systems   Constitutional: Negative for fever and weight loss.   HENT: Negative for congestion, hearing loss and sore throat.    Eyes: Negative for blurred vision.   Respiratory: Negative for cough and shortness  of breath.    Cardiovascular: Negative for chest pain, palpitations, claudication and leg swelling.   Gastrointestinal: Negative for abdominal pain, constipation, diarrhea, heartburn, nausea and vomiting.   Genitourinary: Negative for dysuria.   Musculoskeletal: Negative for back pain and myalgias.   Skin: Negative for rash.   Neurological: Negative for focal weakness and headaches.   Psychiatric/Behavioral: Negative for depression, memory loss and suicidal ideas. The patient is not nervous/anxious.         Medications:  Outpatient Encounter Medications as of 7/18/2022   Medication Sig Dispense Refill    amLODIPine (NORVASC) 10 MG tablet TAKE 1 TABLET EVERY DAY 90 tablet 3    aspirin-calcium carbonate 81 mg-300 mg calcium(777 mg) Tab aspirin 81 mg tablet   Take by oral route.      finasteride (PROSCAR) 5 mg tablet TAKE 1 TABLET EVERY DAY 90 tablet 3    hydroCHLOROthiazide (HYDRODIURIL) 12.5 MG Tab Take 1 tablet (12.5 mg total) by mouth once daily. (Patient taking differently: Take 12.5 mg by mouth once daily. Take 1/2 pill daily) 90 tablet 3    irbesartan (AVAPRO) 300 MG tablet TAKE 1 TABLET EVERY DAY 90 tablet 3    multivitamin capsule Take 1 capsule by mouth once daily.      omeprazole (PRILOSEC) 20 MG capsule TAKE 1 CAPSULE EVERY DAY 90 capsule 3    rosuvastatin (CRESTOR) 10 MG tablet TAKE 1 TABLET EVERY DAY 90 tablet 3    tamsulosin (FLOMAX) 0.4 mg Cap TAKE 1 CAPSULE EVERY DAY 90 capsule 3    vit A/vit C/vit E/zinc/copper (ICAPS AREDS ORAL) Take by mouth.      sildenafiL (VIAGRA) 100 MG tablet Take 1 tablet (100 mg total) by mouth daily as needed for Erectile Dysfunction. 10 tablet 1     No facility-administered encounter medications on file as of 7/18/2022.       Allergies:  Review of patient's allergies indicates:   Allergen Reactions    Penicillins     Sulfa (sulfonamide antibiotics)     Sulfur Hives         Physical Exam      Vitals:    07/18/22 1042   BP: 110/70   Pulse: 108   Resp: 18   Temp:  "97.6 °F (36.4 °C)        Vital Signs  Temp: 97.6 °F (36.4 °C)  Temp src: Oral  Pulse: 108  Resp: 18  SpO2: 95 %  BP: 110/70  BP Location: Right arm  Patient Position: Sitting  Pain Score: 0-No pain  Height and Weight  Height: 5' 6" (167.6 cm)  Weight: 75.2 kg (165 lb 12.6 oz)  BSA (Calculated - sq m): 1.87 sq meters  BMI (Calculated): 26.8  Weight in (lb) to have BMI = 25: 154.6]     Body mass index is 26.76 kg/m².    Physical Exam  Constitutional:       Appearance: He is well-developed.   HENT:      Head: Normocephalic.   Eyes:      Pupils: Pupils are equal, round, and reactive to light.   Neck:      Thyroid: No thyromegaly.   Cardiovascular:      Rate and Rhythm: Normal rate and regular rhythm.      Heart sounds: No murmur heard.    No friction rub. No gallop.   Pulmonary:      Effort: Pulmonary effort is normal.      Breath sounds: Normal breath sounds.   Abdominal:      General: Bowel sounds are normal.      Palpations: Abdomen is soft.   Musculoskeletal:         General: Normal range of motion.      Cervical back: Normal range of motion.   Skin:     General: Skin is warm and dry.   Neurological:      Mental Status: He is alert and oriented to person, place, and time.      Sensory: No sensory deficit.   Psychiatric:         Behavior: Behavior normal.          Laboratory:  CBC:  No results for input(s): WBC, RBC, HGB, HCT, PLT, MCV, MCH, MCHC in the last 2160 hours.  CMP:  No results for input(s): GLU, CALCIUM, ALBUMIN, PROT, NA, K, CO2, CL, BUN, ALKPHOS, ALT, AST, BILITOT in the last 2160 hours.    Invalid input(s): CREATININ  URINALYSIS:  No results for input(s): COLORU, CLARITYU, SPECGRAV, PHUR, PROTEINUA, GLUCOSEU, BILIRUBINCON, BLOODU, WBCU, RBCU, BACTERIA, MUCUS, NITRITE, LEUKOCYTESUR, UROBILINOGEN, HYALINECASTS in the last 2160 hours.   LIPIDS:  No results for input(s): TSH, HDL, CHOL, TRIG, LDLCALC, CHOLHDL, NONHDLCHOL, TOTALCHOLEST in the last 2160 hours.  TSH:  No results for input(s): TSH in the last " 2160 hours.  A1C:  No results for input(s): HGBA1C in the last 2160 hours.    Radiology:        Assessment:     Turner Fernandes is a 76 y.o.male with:    History of prostate cancer    Benign essential hypertension    Mixed hyperlipidemia    Benign prostatic hyperplasia without lower urinary tract symptoms                Plan:     Problem List Items Addressed This Visit        Cardiac/Vascular    Benign essential hypertension    Overview     bp well controlled on current regimen            Mixed hyperlipidemia    Overview     crestor working well cont same labs with this visit               Renal/    Benign prostatic hyperplasia without lower urinary tract symptoms    Overview     Cont Flomax  and proscar seeing dr malik              Oncology    History of prostate cancer - Primary    Overview     Cont current surveillance with dr malik                  As above, continue current medications and maintain follow up with specialists.  Return to clinic in 6 months.      Frederick W Dantagnan [facility]

## 2022-07-19 ENCOUNTER — PATIENT MESSAGE (OUTPATIENT)
Dept: INTERNAL MEDICINE | Facility: CLINIC | Age: 77
End: 2022-07-19
Payer: MEDICARE

## 2022-07-19 DIAGNOSIS — E83.52 HYPERCALCEMIA: Primary | ICD-10-CM

## 2022-07-19 LAB
ALBUMIN: 5.2 G/DL (ref 3.5–5)
ALP SERPL-CCNC: 62 U/L (ref 38–126)
ALT SERPL W P-5'-P-CCNC: 30 U/L (ref 7–56)
ANION GAP SERPL CALC-SCNC: 18.9 MMOL/L (ref 9–18)
AST SERPL-CCNC: 39 U/L (ref 7–40)
BASOPHILS ABSOLUTE COUNT: 0.1 THOUSAND/UL (ref 0–0.2)
BASOPHILS NFR BLD: 1.3 % (ref 0–2)
BILIRUB SERPL-MCNC: 0.6 MG/DL (ref 0–1.2)
BUN BLD-MCNC: 17 MG/DL (ref 7–21)
BUN/CREAT SERPL: 17 (ref 6–22)
CALC OSMOLALITY: 284 MOSM/KG (ref 275–295)
CALCIUM SERPL-MCNC: 11.1 MG/DL (ref 8.5–10.3)
CHLORIDE SERPL-SCNC: 100 MMOL/L (ref 98–107)
CHOL/HDLC RATIO: 1.88
CHOLEST SERPL-MSCNC: 272 MG/DL (ref 100–200)
CO2 SERPL-SCNC: 28 MMOL/L (ref 21–31)
CREAT SERPL-MCNC: 1.02 MG/DL (ref 0.7–1.2)
EGFR: 81 ML/MIN
EOSINOPHIL NFR BLD: 2.2 % (ref 0–4)
EOSINOPHILS ABSOLUTE COUNT: 0.1 THOUSAND/UL (ref 0–0.7)
ERYTHROCYTE [DISTWIDTH] IN BLOOD BY AUTOMATED COUNT: 13.7 % (ref 12–15.3)
GFR: 70 ML/MIN
GLUCOSE SERPL-MCNC: 94 MG/DL (ref 70–100)
HCT VFR BLD AUTO: 42.5 % (ref 40–52)
HDLC SERPL-MCNC: 145 MG/DL (ref 40–75)
HGB BLD-MCNC: 14.4 GM/DL (ref 13.6–17.5)
LDLC SERPL CALC-MCNC: 96 MG/DL (ref 0–130)
LYMPHOCYTES %: 26.7 % (ref 15–45)
LYMPHOCYTES ABSOLUTE COUNT: 1.5 THOUSAND/UL (ref 1–4.2)
MCH RBC QN AUTO: 34.7 PG (ref 27–33)
MCHC RBC AUTO-ENTMCNC: 33.9 G/DL (ref 32–36)
MCV RBC AUTO: 102.5 FL (ref 80–94)
MONOCYTES %: 14 % (ref 3–13)
MONOCYTES ABSOLUTE COUNT: 0.8 THOUSAND/UL (ref 0.1–0.8)
NEUTROPHILS ABSOLUTE COUNT: 3.1 THOUSAND/UL (ref 2.1–7.6)
NEUTROPHILS RELATIVE PERCENT: 55.8 % (ref 32–80)
PLATELET # BLD AUTO: 189 THOUSAND/UL (ref 150–350)
PMV BLD AUTO: 8.5 FL (ref 7–10.2)
POTASSIUM SERPL-SCNC: 4.9 MMOL/L (ref 3.5–5)
RBC # BLD AUTO: 4.15 MILLION/UL (ref 4.45–5.9)
SODIUM BLD-SCNC: 142 MMOL/L (ref 135–145)
TOTAL PROTEIN: 7.7 G/DL (ref 6.3–8.2)
TRIGL SERPL-MCNC: 191 MG/DL (ref 30–150)
WBC # BLD AUTO: 5.6 THOUSAND/UL (ref 4.5–11)

## 2022-07-26 LAB
ANION GAP SERPL CALC-SCNC: 18.3 MMOL/L (ref 9–18)
BUN BLD-MCNC: 18 MG/DL (ref 7–21)
BUN/CREAT SERPL: 17 (ref 6–22)
CALC OSMOLALITY: 285 MOSM/KG (ref 275–295)
CALCIUM SERPL-MCNC: 10.4 MG/DL (ref 8.5–10.3)
CHLORIDE SERPL-SCNC: 102 MMOL/L (ref 98–107)
CO2 SERPL-SCNC: 26 MMOL/L (ref 21–31)
CREAT SERPL-MCNC: 1.09 MG/DL (ref 0.7–1.2)
EGFR: 75 ML/MIN
GFR: 65 ML/MIN
GLUCOSE SERPL-MCNC: 93 MG/DL (ref 70–100)
POTASSIUM SERPL-SCNC: 4.3 MMOL/L (ref 3.5–5)
PTH-INTACT SERPL-MCNC: 81 PG/ML (ref 16–77)
SODIUM BLD-SCNC: 142 MMOL/L (ref 135–145)

## 2022-07-27 ENCOUNTER — PATIENT MESSAGE (OUTPATIENT)
Dept: INTERNAL MEDICINE | Facility: CLINIC | Age: 77
End: 2022-07-27
Payer: MEDICARE

## 2022-07-27 ENCOUNTER — TELEPHONE (OUTPATIENT)
Dept: INTERNAL MEDICINE | Facility: CLINIC | Age: 77
End: 2022-07-27
Payer: MEDICARE

## 2022-07-27 DIAGNOSIS — E83.52 HYPERCALCEMIA: Primary | ICD-10-CM

## 2022-07-27 NOTE — TELEPHONE ENCOUNTER
----- Message from Gita Reaves sent at 7/27/2022  9:48 AM CDT -----  Contact: [phone]  2TESTRESULTS    Type: Test Results    What test was performed?LABS (Calcium)     Who ordered the test? Jason    When and where were the test performed? 7/18/22 [facility]    Would you like response via Mychart: call    Comments:

## 2022-07-29 NOTE — TELEPHONE ENCOUNTER
Repeat calcium is jsut a little high barely outside normal range . Will repeat again in 1 m with pth if elevated again will refer to endocrine

## 2022-08-01 ENCOUNTER — TELEPHONE (OUTPATIENT)
Dept: INTERNAL MEDICINE | Facility: CLINIC | Age: 77
End: 2022-08-01
Payer: MEDICARE

## 2022-08-01 NOTE — TELEPHONE ENCOUNTER
----- Message from Yudi Carl sent at 8/1/2022 10:58 AM CDT -----  Contact: [phone]  Patient is returning a phone call.  Who left a message for the patient:   Does patient know what this is regarding:  referral  Would you like a call back, or a response through your [facility] portal?:   call back  Comments:  Patient is waiting on a call from endocrinology and has not heard from them yet, please call and advise.

## 2022-08-24 ENCOUNTER — TELEPHONE (OUTPATIENT)
Dept: ENDOCRINOLOGY | Facility: CLINIC | Age: 77
End: 2022-08-24
Payer: MEDICARE

## 2022-08-24 NOTE — TELEPHONE ENCOUNTER
----- Message from Estefani Shen MA sent at 8/23/2022  2:54 PM CDT -----  Regarding: FW: appt  Contact: pt    ----- Message -----  From: Evelyn Askew  Sent: 8/23/2022   1:48 PM CDT  To: Maryan Connolly Staff  Subject: appt                                             Pt calling to schedule appt , has referral in epic , please call     Confirmed patient's contact info below:  Contact Name: Turner Fernandes  Phone Number: [phone]

## 2022-10-10 ENCOUNTER — TELEPHONE (OUTPATIENT)
Dept: INTERNAL MEDICINE | Facility: CLINIC | Age: 77
End: 2022-10-10
Payer: MEDICARE

## 2022-10-10 NOTE — TELEPHONE ENCOUNTER
----- Message from Gilmer Hopkins sent at 10/10/2022  4:40 PM CDT -----  Contact: [phone]  Pt would like a call back to see when was his last flu shot. Please call pt back.

## 2022-11-18 ENCOUNTER — PES CALL (OUTPATIENT)
Dept: ADMINISTRATIVE | Facility: OTHER | Age: 77
End: 2022-11-18
Payer: MEDICARE

## 2022-12-13 ENCOUNTER — OFFICE VISIT (OUTPATIENT)
Dept: ENDOCRINOLOGY | Facility: CLINIC | Age: 77
End: 2022-12-13
Payer: MEDICARE

## 2022-12-13 VITALS
DIASTOLIC BLOOD PRESSURE: 70 MMHG | OXYGEN SATURATION: 99 % | SYSTOLIC BLOOD PRESSURE: 120 MMHG | WEIGHT: 165 LBS | BODY MASS INDEX: 26.63 KG/M2 | HEART RATE: 65 BPM | RESPIRATION RATE: 16 BRPM

## 2022-12-13 DIAGNOSIS — I10 BENIGN ESSENTIAL HYPERTENSION: ICD-10-CM

## 2022-12-13 DIAGNOSIS — E83.52 HYPERCALCEMIA: Primary | ICD-10-CM

## 2022-12-13 DIAGNOSIS — R71.8 ELEVATED MCV: ICD-10-CM

## 2022-12-13 PROCEDURE — 99999 PR PBB SHADOW E&M-EST. PATIENT-LVL IV: CPT | Mod: PBBFAC,GC,, | Performed by: STUDENT IN AN ORGANIZED HEALTH CARE EDUCATION/TRAINING PROGRAM

## 2022-12-13 PROCEDURE — 1159F MED LIST DOCD IN RCRD: CPT | Mod: CPTII,GC,S$GLB, | Performed by: STUDENT IN AN ORGANIZED HEALTH CARE EDUCATION/TRAINING PROGRAM

## 2022-12-13 PROCEDURE — 99204 PR OFFICE/OUTPT VISIT, NEW, LEVL IV, 45-59 MIN: ICD-10-PCS | Mod: GC,S$GLB,, | Performed by: STUDENT IN AN ORGANIZED HEALTH CARE EDUCATION/TRAINING PROGRAM

## 2022-12-13 PROCEDURE — 3074F PR MOST RECENT SYSTOLIC BLOOD PRESSURE < 130 MM HG: ICD-10-PCS | Mod: CPTII,GC,S$GLB, | Performed by: STUDENT IN AN ORGANIZED HEALTH CARE EDUCATION/TRAINING PROGRAM

## 2022-12-13 PROCEDURE — 99999 PR PBB SHADOW E&M-EST. PATIENT-LVL IV: ICD-10-PCS | Mod: PBBFAC,GC,, | Performed by: STUDENT IN AN ORGANIZED HEALTH CARE EDUCATION/TRAINING PROGRAM

## 2022-12-13 PROCEDURE — 1160F RVW MEDS BY RX/DR IN RCRD: CPT | Mod: CPTII,GC,S$GLB, | Performed by: STUDENT IN AN ORGANIZED HEALTH CARE EDUCATION/TRAINING PROGRAM

## 2022-12-13 PROCEDURE — 3074F SYST BP LT 130 MM HG: CPT | Mod: CPTII,GC,S$GLB, | Performed by: STUDENT IN AN ORGANIZED HEALTH CARE EDUCATION/TRAINING PROGRAM

## 2022-12-13 PROCEDURE — 1160F PR REVIEW ALL MEDS BY PRESCRIBER/CLIN PHARMACIST DOCUMENTED: ICD-10-PCS | Mod: CPTII,GC,S$GLB, | Performed by: STUDENT IN AN ORGANIZED HEALTH CARE EDUCATION/TRAINING PROGRAM

## 2022-12-13 PROCEDURE — 3078F DIAST BP <80 MM HG: CPT | Mod: CPTII,GC,S$GLB, | Performed by: STUDENT IN AN ORGANIZED HEALTH CARE EDUCATION/TRAINING PROGRAM

## 2022-12-13 PROCEDURE — 99204 OFFICE O/P NEW MOD 45 MIN: CPT | Mod: GC,S$GLB,, | Performed by: STUDENT IN AN ORGANIZED HEALTH CARE EDUCATION/TRAINING PROGRAM

## 2022-12-13 PROCEDURE — 3078F PR MOST RECENT DIASTOLIC BLOOD PRESSURE < 80 MM HG: ICD-10-PCS | Mod: CPTII,GC,S$GLB, | Performed by: STUDENT IN AN ORGANIZED HEALTH CARE EDUCATION/TRAINING PROGRAM

## 2022-12-13 PROCEDURE — 99499 UNLISTED E&M SERVICE: CPT | Mod: HCNC,S$GLB,, | Performed by: STUDENT IN AN ORGANIZED HEALTH CARE EDUCATION/TRAINING PROGRAM

## 2022-12-13 PROCEDURE — 1125F PR PAIN SEVERITY QUANTIFIED, PAIN PRESENT: ICD-10-PCS | Mod: CPTII,GC,S$GLB, | Performed by: STUDENT IN AN ORGANIZED HEALTH CARE EDUCATION/TRAINING PROGRAM

## 2022-12-13 PROCEDURE — 1159F PR MEDICATION LIST DOCUMENTED IN MEDICAL RECORD: ICD-10-PCS | Mod: CPTII,GC,S$GLB, | Performed by: STUDENT IN AN ORGANIZED HEALTH CARE EDUCATION/TRAINING PROGRAM

## 2022-12-13 PROCEDURE — 1125F AMNT PAIN NOTED PAIN PRSNT: CPT | Mod: CPTII,GC,S$GLB, | Performed by: STUDENT IN AN ORGANIZED HEALTH CARE EDUCATION/TRAINING PROGRAM

## 2022-12-13 PROCEDURE — 99499 RISK ADDL DX/OHS AUDIT: ICD-10-PCS | Mod: HCNC,S$GLB,, | Performed by: STUDENT IN AN ORGANIZED HEALTH CARE EDUCATION/TRAINING PROGRAM

## 2022-12-13 NOTE — ASSESSMENT & PLAN NOTE
Key History and Diagnostic Findings  - Calcium levels have been elevated dating back to 2019, no labs prior to this to review.  Also no prior Vitamin D level to review.     - On HCTZ outpatient for BP which may be contributing to hypercalcemia to a small degree  - Hypercalcemia appears to be PTH medicated with suspicion of primary hyperparathyroidism.  No exogenous calcium supplementation that is concerning.      Plan  - Hold the low dose HCTZ as BP is at goal.  Monitor BP outpatient and notify us of any concerning increases in BP.  May need to consider alternate therapy at that time for BP control.    - In 90 days we will re-check PTH, renal panel with phosphorus, and vitamin-D.  - Start Vitamin D3, 2000 units daily for now  - If workup suggests primary hyperparathyroidism on repeat labs we will then check 24 hr urine calcium to assess for FHH (familial hypocalciuric hypercalcemia) - must remain off HCTZ for 90 days til we can do so.    - We will consider DEXA scan on next clinic follow up in 3 months if continued findings of hyperparathyroidism    - Discussed indications for surgical treatment with parathyroidectomy if primary hyperparathyroidism proven.   - Hydration encouraged, patient instructed to avoid excessive calcium supplementation and medications (HCTZ) that can worsen hypercalcemia

## 2022-12-13 NOTE — ASSESSMENT & PLAN NOTE
- BP in goal today  - As mentioned we will hold HCTZ for now  - Can continue on Amlodipine 10 mg daily  - Monitor BP at home and notify us of worsening

## 2022-12-13 NOTE — PROGRESS NOTES
Subjective:      Patient ID: Turner Fernandes is a 77 y.o. male.    Chief Complaint:  Hyperparathyroidism      History of Present Illness  Turner Fernandes presents to the clinic with concerns of hypercalcemia and elevated PTH level.  He does take HCTZ daily and has not had a recent Vitamin D level.  Due to persistent hypercalcemia for many years his PCP, Dr. Gomez, referred him to us for further evaluation and recommendations.        Regarding Hypercalcemia and/or Primary Hyperparathyroidism:    - Notable for elevated calcium levels since:  2019   -   Lab Results   Component Value Date    PTH 81 (H) 07/25/2022    CALCIUM 10.4 (H) 07/25/2022    CALCIUM 11.1 (H) 07/19/2022    CALCIUM 10.5 (H) 11/04/2020    ALBUMIN 5.2 (H) 07/19/2022    ALBUMIN 4.7 11/04/2020    ALBUMIN 4.3 09/23/2019    ALKPHOS 62 07/19/2022    ALKPHOS 63 11/04/2020    ALKPHOS 54 (L) 09/23/2019    ESTGFRAFRICA >60.0 09/23/2019    EGFRNONAA >60.0 09/23/2019       - Daily intake of calcium: None  - Daily intake of vitamin D:  None  - Taking lithium or hydrochlorothiazide: yes, takes 1/2 of a 12.5 mg tablet  - Clinically stable, with nonspecific complaints  - Most recent DEXA: None prior    - Does not meet surgical indication at this time: No nephrolithiasis, hypercalcuria (24 hour urine calcium level greater than 400 mg/dL), impaired renal function (GFR less than 60 mL/minute), Osteoporosis (BDS less than -2.5), fragility fracture, or asymptomatic vertebral compression fracture, serum calcium level greater than or equal to 11.5      - Patient denies neuro symptoms, depression, mental fog, anxiety, polyuria, polydipsia, nausea/vomiting, constipation, history of pancreatitis, GERD with frequent Tums, muscle weakness, bone pain, fatigue      ROS:   As above    Objective:     /70   Pulse 65   Resp 16   Wt 74.8 kg (165 lb)   SpO2 99%   BMI 26.63 kg/m²   BP Readings from Last 3 Encounters:   12/13/22 120/70   07/18/22 110/70   01/14/22 132/84     Wt  Readings from Last 1 Encounters:   12/13/22 1051 74.8 kg (165 lb)     Body mass index is 26.63 kg/m².      Physical Exam  Vitals reviewed.   Constitutional:       Appearance: Normal appearance.   HENT:      Head: Normocephalic and atraumatic.   Eyes:      Extraocular Movements: Extraocular movements intact.   Cardiovascular:      Rate and Rhythm: Regular rhythm. Tachycardia present.      Pulses: Normal pulses.   Pulmonary:      Effort: Pulmonary effort is normal.   Abdominal:      Palpations: Abdomen is soft.   Musculoskeletal:      Cervical back: Normal range of motion and neck supple.   Skin:     General: Skin is warm.   Neurological:      General: No focal deficit present.      Mental Status: He is alert and oriented to person, place, and time.   Psychiatric:         Behavior: Behavior normal.       Lab Review:   No results found for: HGBA1C  Lab Results   Component Value Date    CHOL 272 (H) 07/19/2022     (H) 07/19/2022    LDLCALC 96 07/19/2022    TRIG 191 (H) 07/19/2022    CHOLHDL 40.6 09/23/2019     Lab Results   Component Value Date     07/25/2022    K 4.3 07/25/2022     07/25/2022    CO2 26 07/25/2022    GLU 93 07/25/2022    BUN 18.0 07/25/2022    CREATININE 1.09 07/25/2022    CALCIUM 10.4 (H) 07/25/2022    PROT 7.7 07/19/2022    ALBUMIN 5.2 (H) 07/19/2022    BILITOT 0.6 07/19/2022    ALKPHOS 62 07/19/2022    AST 39 07/19/2022    ALT 30 07/19/2022    ANIONGAP 18.3 (H) 07/25/2022    ESTGFRAFRICA >60.0 09/23/2019    EGFRNONAA >60.0 09/23/2019     No results found for: [id number]    Assessment and Plan     Hypercalcemia    Key History and Diagnostic Findings  - Calcium levels have been elevated dating back to 2019, no labs prior to this to review.  Also no prior Vitamin D level to review.     - On HCTZ outpatient for BP which may be contributing to hypercalcemia to a small degree  - Hypercalcemia appears to be PTH medicated with suspicion of primary hyperparathyroidism.  No exogenous  calcium supplementation that is concerning.      Plan  - Hold the low dose HCTZ as BP is at goal.  Monitor BP outpatient and notify us of any concerning increases in BP.  May need to consider alternate therapy at that time for BP control.    - In 90 days we will re-check PTH, renal panel with phosphorus, and vitamin-D.  - Start Vitamin D3, 2000 units daily for now  - If workup suggests primary hyperparathyroidism on repeat labs we will then check 24 hr urine calcium to assess for FHH (familial hypocalciuric hypercalcemia) - must remain off HCTZ for 90 days til we can do so.    - We will consider DEXA scan on next clinic follow up in 3 months if continued findings of hyperparathyroidism    - Discussed indications for surgical treatment with parathyroidectomy if primary hyperparathyroidism proven.   - Hydration encouraged, patient instructed to avoid excessive calcium supplementation and medications (HCTZ) that can worsen hypercalcemia    Benign essential hypertension  - BP in goal today  - As mentioned we will hold HCTZ for now  - Can continue on Amlodipine 10 mg daily  - Monitor BP at home and notify us of worsening    Elevated MCV  - History of daily alcohol use is the likely cause  - Can consider labs with PCP in the future to ensure adequate vitamin intake (Thiamine, Folic Acid, Vit B12)  - No further intervention from endocrine standpoint at this time      RTC in 3 months with labs prior      Nicholas Manuel, DO [facility], [address]  [address] [place], [place], [address]    Office: [phone]  Fax: [phone]

## 2022-12-13 NOTE — ASSESSMENT & PLAN NOTE
- History of daily alcohol use is the likely cause  - Can consider labs with PCP in the future to ensure adequate vitamin intake (Thiamine, Folic Acid, Vit B12)  - No further intervention from endocrine standpoint at this time

## 2022-12-13 NOTE — PATIENT INSTRUCTIONS
We will plan a visit with you again in 3 months.  For now you should hold the 1/2 tablet of Hydrochlorothiazide that you are taking for your blood pressure.  You should continue to check blood pressure at home while this medicine is held and let us know if you are having issues with high blood pressures.  We would like you to start a Vitamin D3 supplement over the counter.  This should be a 2000 unit (IU) Vit D3 pill once daily.  We will plan repeat labs in 3 months before our next visit.  If calcium levels are high at that time we will check additional labs and also check a bone scan.

## 2023-01-18 ENCOUNTER — OFFICE VISIT (OUTPATIENT)
Dept: INTERNAL MEDICINE | Facility: CLINIC | Age: 78
End: 2023-01-18
Payer: MEDICARE

## 2023-01-18 VITALS
WEIGHT: 164.56 LBS | SYSTOLIC BLOOD PRESSURE: 120 MMHG | TEMPERATURE: 99 F | DIASTOLIC BLOOD PRESSURE: 80 MMHG | OXYGEN SATURATION: 95 % | RESPIRATION RATE: 18 BRPM | HEART RATE: 90 BPM | HEIGHT: 66 IN | BODY MASS INDEX: 26.45 KG/M2

## 2023-01-18 DIAGNOSIS — M25.512 ACUTE PAIN OF LEFT SHOULDER: ICD-10-CM

## 2023-01-18 DIAGNOSIS — I10 BENIGN ESSENTIAL HYPERTENSION: Primary | ICD-10-CM

## 2023-01-18 DIAGNOSIS — Z85.46 HISTORY OF PROSTATE CANCER: ICD-10-CM

## 2023-01-18 DIAGNOSIS — N40.0 BENIGN PROSTATIC HYPERPLASIA WITHOUT LOWER URINARY TRACT SYMPTOMS: ICD-10-CM

## 2023-01-18 DIAGNOSIS — E21.3 HYPERPARATHYROIDISM: ICD-10-CM

## 2023-01-18 DIAGNOSIS — E78.2 MIXED HYPERLIPIDEMIA: ICD-10-CM

## 2023-01-18 DIAGNOSIS — R97.20 ELEVATED PSA, LESS THAN 10 NG/ML: ICD-10-CM

## 2023-01-18 PROCEDURE — 99999 PR PBB SHADOW E&M-EST. PATIENT-LVL IV: CPT | Mod: PBBFAC,HCNC,, | Performed by: INTERNAL MEDICINE

## 2023-01-18 PROCEDURE — 3074F PR MOST RECENT SYSTOLIC BLOOD PRESSURE < 130 MM HG: ICD-10-PCS | Mod: HCNC,CPTII,S$GLB, | Performed by: INTERNAL MEDICINE

## 2023-01-18 PROCEDURE — 1101F PR PT FALLS ASSESS DOC 0-1 FALLS W/OUT INJ PAST YR: ICD-10-PCS | Mod: HCNC,CPTII,S$GLB, | Performed by: INTERNAL MEDICINE

## 2023-01-18 PROCEDURE — 1159F MED LIST DOCD IN RCRD: CPT | Mod: HCNC,CPTII,S$GLB, | Performed by: INTERNAL MEDICINE

## 2023-01-18 PROCEDURE — 1126F AMNT PAIN NOTED NONE PRSNT: CPT | Mod: HCNC,CPTII,S$GLB, | Performed by: INTERNAL MEDICINE

## 2023-01-18 PROCEDURE — 99214 PR OFFICE/OUTPT VISIT, EST, LEVL IV, 30-39 MIN: ICD-10-PCS | Mod: HCNC,S$GLB,, | Performed by: INTERNAL MEDICINE

## 2023-01-18 PROCEDURE — 1126F PR PAIN SEVERITY QUANTIFIED, NO PAIN PRESENT: ICD-10-PCS | Mod: HCNC,CPTII,S$GLB, | Performed by: INTERNAL MEDICINE

## 2023-01-18 PROCEDURE — 3288F FALL RISK ASSESSMENT DOCD: CPT | Mod: HCNC,CPTII,S$GLB, | Performed by: INTERNAL MEDICINE

## 2023-01-18 PROCEDURE — 3079F PR MOST RECENT DIASTOLIC BLOOD PRESSURE 80-89 MM HG: ICD-10-PCS | Mod: HCNC,CPTII,S$GLB, | Performed by: INTERNAL MEDICINE

## 2023-01-18 PROCEDURE — 99214 OFFICE O/P EST MOD 30 MIN: CPT | Mod: HCNC,S$GLB,, | Performed by: INTERNAL MEDICINE

## 2023-01-18 PROCEDURE — 1160F RVW MEDS BY RX/DR IN RCRD: CPT | Mod: HCNC,CPTII,S$GLB, | Performed by: INTERNAL MEDICINE

## 2023-01-18 PROCEDURE — 1160F PR REVIEW ALL MEDS BY PRESCRIBER/CLIN PHARMACIST DOCUMENTED: ICD-10-PCS | Mod: HCNC,CPTII,S$GLB, | Performed by: INTERNAL MEDICINE

## 2023-01-18 PROCEDURE — 3074F SYST BP LT 130 MM HG: CPT | Mod: HCNC,CPTII,S$GLB, | Performed by: INTERNAL MEDICINE

## 2023-01-18 PROCEDURE — 99999 PR PBB SHADOW E&M-EST. PATIENT-LVL IV: ICD-10-PCS | Mod: PBBFAC,HCNC,, | Performed by: INTERNAL MEDICINE

## 2023-01-18 PROCEDURE — 3288F PR FALLS RISK ASSESSMENT DOCUMENTED: ICD-10-PCS | Mod: HCNC,CPTII,S$GLB, | Performed by: INTERNAL MEDICINE

## 2023-01-18 PROCEDURE — 1159F PR MEDICATION LIST DOCUMENTED IN MEDICAL RECORD: ICD-10-PCS | Mod: HCNC,CPTII,S$GLB, | Performed by: INTERNAL MEDICINE

## 2023-01-18 PROCEDURE — 3079F DIAST BP 80-89 MM HG: CPT | Mod: HCNC,CPTII,S$GLB, | Performed by: INTERNAL MEDICINE

## 2023-01-18 PROCEDURE — 1101F PT FALLS ASSESS-DOCD LE1/YR: CPT | Mod: HCNC,CPTII,S$GLB, | Performed by: INTERNAL MEDICINE

## 2023-01-18 RX ORDER — CHOLECALCIFEROL (VITAMIN D3) 25 MCG
1000 TABLET ORAL DAILY
COMMUNITY

## 2023-01-18 NOTE — PROGRESS NOTES
[facility] Note    Chief Complaint      Chief Complaint   Patient presents with    Follow-up     6m        History of Present Illness      Turner Fernandes is a 77 y.o. male who presents today for   Chief Complaint   Patient presents with    Follow-up     6m    .  Patient comes to appointment here for 6m checkup for chronic issues as discussed in detail below . Since last visit ahs seen endo for hypercalcemia . Primary hyperparathyroidism . Has fu visit in march . Is feeling great currently is complaint whit all meds and specialist f.u  HPI    No problem-specific Assessment & Plan notes found for this encounter.       Problem List Items Addressed This Visit          Cardiac/Vascular    Benign essential hypertension - Primary    Overview     bp well controlled on current regimen is off hctz          Mixed hyperlipidemia    Overview     crestor working well cont same labs with next visit t             Renal/    Elevated PSA, less than 10 ng/ml    Overview      dr malik urology managing          Benign prostatic hyperplasia without lower urinary tract symptoms    Overview     Cont Flomax  and proscar seeing dr malik            Oncology    History of prostate cancer    Overview     Cont current surveillance with dr malik             Endocrine    Hyperparathyroidism    Overview     Endo managing has visit in march             Orthopedic    Acute pain of left shoulder    Overview     Will refer to orhto for eval and treat              Past Medical History:  Past Medical History:   Diagnosis Date    Cancer     Hyperlipidemia     Hypertension        Past Surgical History:  Past Surgical History:   Procedure Laterality Date    SHOULDER SURGERY         Family History:  family history includes Heart attack in his mother.     Social History:  Social History     Socioeconomic History    Marital status:    Tobacco Use    Smoking status: Never    Smokeless tobacco: Never   Substance and  Sexual Activity    Alcohol use: Yes     Comment: social       Review of Systems:   Review of Systems   Constitutional:  Negative for fever and weight loss.   HENT:  Negative for congestion, hearing loss and sore throat.    Eyes:  Negative for blurred vision.   Respiratory:  Negative for cough and shortness of breath.    Cardiovascular:  Negative for chest pain, palpitations, claudication and leg swelling.   Gastrointestinal:  Negative for abdominal pain, constipation, diarrhea and heartburn.   Genitourinary:  Negative for dysuria.   Musculoskeletal:  Positive for joint pain. Negative for back pain and myalgias.   Skin:  Negative for rash.   Neurological:  Negative for tingling, focal weakness and headaches.   Psychiatric/Behavioral:  Negative for depression, memory loss and suicidal ideas. The patient is not nervous/anxious.       Medications:  Outpatient Encounter Medications as of 1/18/2023   Medication Sig Dispense Refill    amLODIPine (NORVASC) 10 MG tablet TAKE 1 TABLET EVERY DAY 90 tablet 3    aspirin-calcium carbonate 81 mg-300 mg calcium(777 mg) Tab aspirin 81 mg tablet   Take by oral route.      finasteride (PROSCAR) 5 mg tablet TAKE 1 TABLET EVERY DAY 90 tablet 3    irbesartan (AVAPRO) 300 MG tablet TAKE 1 TABLET EVERY DAY 90 tablet 3    multivitamin capsule Take 1 capsule by mouth once daily.      omeprazole (PRILOSEC) 20 MG capsule TAKE 1 CAPSULE EVERY DAY 90 capsule 3    rosuvastatin (CRESTOR) 10 MG tablet TAKE 1 TABLET EVERY DAY 90 tablet 3    sildenafiL (VIAGRA) 100 MG tablet Take 1 tablet (100 mg total) by mouth daily as needed for Erectile Dysfunction. 10 tablet 1    tamsulosin (FLOMAX) 0.4 mg Cap TAKE 1 CAPSULE EVERY DAY 90 capsule 3    vit A/vit C/vit E/zinc/copper (ICAPS AREDS ORAL) Take by mouth.      vitamin D (VITAMIN D3) 1000 units Tab Take 1,000 Units by mouth once daily. 2,000 iu daily       No facility-administered encounter medications on file as of 1/18/2023.       Allergies:  Review of  "patient's allergies indicates:   Allergen Reactions    Penicillins     Sulfa (sulfonamide antibiotics)     Sulfur Hives         Physical Exam      Vitals:    01/18/23 1058   BP: 120/80   Pulse: 90   Resp: 18   Temp: 98.6 °F (37 °C)        Vital Signs  Temp: 98.6 °F (37 °C)  Temp src: Oral  Pulse: 90  Resp: 18  SpO2: 95 %  BP: 120/80  BP Location: Right arm  Patient Position: Sitting  Pain Score: 0-No pain  Height and Weight  Height: 5' 6" (167.6 cm)  Weight: 74.6 kg (164 lb 9.2 oz)  BSA (Calculated - sq m): 1.86 sq meters  BMI (Calculated): 26.6  Weight in (lb) to have BMI = 25: 154.6]     Body mass index is 26.56 kg/m².    Physical Exam  Constitutional:       Appearance: He is well-developed.   HENT:      Head: Normocephalic.   Eyes:      Pupils: Pupils are equal, round, and reactive to light.   Neck:      Thyroid: No thyromegaly.   Cardiovascular:      Rate and Rhythm: Normal rate and regular rhythm.      Heart sounds: No murmur heard.    No friction rub. No gallop.   Pulmonary:      Effort: Pulmonary effort is normal.      Breath sounds: Normal breath sounds.   Abdominal:      General: Bowel sounds are normal.      Palpations: Abdomen is soft.   Musculoskeletal:      Left shoulder: Tenderness present. Normal range of motion.      Cervical back: Normal range of motion.   Skin:     General: Skin is warm and dry.   Neurological:      Mental Status: He is alert and oriented to person, place, and time.      Sensory: No sensory deficit.   Psychiatric:         Behavior: Behavior normal.        Laboratory:  CBC:  No results for input(s): WBC, RBC, HGB, HCT, PLT, MCV, MCH, MCHC in the last 2160 hours.  CMP:  No results for input(s): GLU, CALCIUM, ALBUMIN, PROT, NA, K, CO2, CL, BUN, ALKPHOS, ALT, AST, BILITOT in the last 2160 hours.    Invalid input(s): CREATININ  URINALYSIS:  No results for input(s): COLORU, CLARITYU, SPECGRAV, PHUR, PROTEINUA, GLUCOSEU, BILIRUBINCON, BLOODU, WBCU, RBCU, BACTERIA, MUCUS, NITRITE, " LEUKOCYTESUR, UROBILINOGEN, HYALINECASTS in the last 2160 hours.   LIPIDS:  No results for input(s): TSH, HDL, CHOL, TRIG, LDLCALC, CHOLHDL, NONHDLCHOL, TOTALCHOLEST in the last 2160 hours.  TSH:  No results for input(s): TSH in the last 2160 hours.  A1C:  No results for input(s): HGBA1C in the last 2160 hours.    Radiology:        Assessment:     Turner Fernandes is a 77 y.o.male with:    Benign essential hypertension    History of prostate cancer    Elevated PSA, less than 10 ng/ml    Benign prostatic hyperplasia without lower urinary tract symptoms    Mixed hyperlipidemia    Hyperparathyroidism    Acute pain of left shoulder                Plan:     Problem List Items Addressed This Visit          Cardiac/Vascular    Benign essential hypertension - Primary    Overview     bp well controlled on current regimen is off hctz          Mixed hyperlipidemia    Overview     crestor working well cont same labs with next visit t             Renal/    Elevated PSA, less than 10 ng/ml    Overview      dr malik urology managing          Benign prostatic hyperplasia without lower urinary tract symptoms    Overview     Cont Flomax  and proscar seeing dr malik            Oncology    History of prostate cancer    Overview     Cont current surveillance with dr malik             Endocrine    Hyperparathyroidism    Overview     Endo managing has visit in march             Orthopedic    Acute pain of left shoulder    Overview     Will refer to orhto for eval and treat             As above, continue current medications and maintain follow up with specialists.  Return to clinic in 6 months.      Frederick W Dantagnan [facility] [place]

## 2023-02-02 ENCOUNTER — TELEPHONE (OUTPATIENT)
Dept: PRIMARY CARE CLINIC | Facility: CLINIC | Age: 78
End: 2023-02-02
Payer: MEDICARE

## 2023-02-02 DIAGNOSIS — M25.512 ACUTE PAIN OF LEFT SHOULDER: Primary | ICD-10-CM

## 2023-02-02 NOTE — TELEPHONE ENCOUNTER
----- Message from Tera Gomez MD sent at 2/2/2023  8:14 AM CST -----  Contact: CORINA ALFONSO [[id number]]@ [phone]  Done   ----- Message -----  From: Kaley Cadet MA  Sent: 2/1/2023   1:57 PM CST  To: Tera Gomez MD    Pt requesting ortho referral    ----- Message -----  From: Maryjo Owen  Sent: 2/1/2023   1:54 PM CST  To: [facility] Staff    Patient is calling to remind you to put in his referral to orthopedics. You ask him to wait until your were in the new building.

## 2023-02-07 DIAGNOSIS — Z00.00 ENCOUNTER FOR MEDICARE ANNUAL WELLNESS EXAM: ICD-10-CM

## 2023-02-09 DIAGNOSIS — Z00.00 ENCOUNTER FOR MEDICARE ANNUAL WELLNESS EXAM: ICD-10-CM

## 2023-02-13 RX ORDER — OMEPRAZOLE 20 MG/1
CAPSULE, DELAYED RELEASE ORAL
Qty: 90 CAPSULE | Refills: 3 | Status: SHIPPED | OUTPATIENT
Start: 2023-02-13 | End: 2024-02-15

## 2023-02-13 RX ORDER — IRBESARTAN 300 MG/1
TABLET ORAL
Qty: 90 TABLET | Refills: 1 | Status: SHIPPED | OUTPATIENT
Start: 2023-02-13 | End: 2023-11-09

## 2023-02-13 RX ORDER — TAMSULOSIN HYDROCHLORIDE 0.4 MG/1
CAPSULE ORAL
Qty: 90 CAPSULE | Refills: 3 | Status: SHIPPED | OUTPATIENT
Start: 2023-02-13 | End: 2024-02-15

## 2023-02-13 RX ORDER — AMLODIPINE BESYLATE 10 MG/1
TABLET ORAL
Qty: 90 TABLET | Refills: 3 | Status: SHIPPED | OUTPATIENT
Start: 2023-02-13 | End: 2024-02-15

## 2023-02-13 RX ORDER — ROSUVASTATIN CALCIUM 10 MG/1
TABLET, COATED ORAL
Qty: 90 TABLET | Refills: 1 | Status: SHIPPED | OUTPATIENT
Start: 2023-02-13 | End: 2023-10-19

## 2023-02-13 RX ORDER — FINASTERIDE 5 MG/1
TABLET, FILM COATED ORAL
Qty: 90 TABLET | Refills: 3 | Status: SHIPPED | OUTPATIENT
Start: 2023-02-13 | End: 2024-02-15

## 2023-02-13 NOTE — TELEPHONE ENCOUNTER
No new care gaps identified.  [facility] Embedded Care Gaps. Reference number: [id number]. 2/13/2023   11:17:29 AM CST

## 2023-02-14 NOTE — TELEPHONE ENCOUNTER
Refill Decision Note   Turner Fernandes  is requesting a refill authorization.  Brief Assessment and Rationale for Refill:  Approve     Medication Therapy Plan:       Medication Reconciliation Completed: No   Comments:     No Care Gaps recommended.     Note composed:6:03 PM 02/13/2023

## 2023-02-15 ENCOUNTER — HOSPITAL ENCOUNTER (OUTPATIENT)
Dept: RADIOLOGY | Facility: HOSPITAL | Age: 78
Discharge: HOME | End: 2023-02-15
Attending: ORTHOPAEDIC SURGERY
Payer: MEDICARE

## 2023-02-15 ENCOUNTER — OFFICE VISIT (OUTPATIENT)
Dept: SPORTS MEDICINE | Facility: CLINIC | Age: 78
End: 2023-02-15
Payer: MEDICARE

## 2023-02-15 VITALS
WEIGHT: 164 LBS | BODY MASS INDEX: 26.36 KG/M2 | HEIGHT: 66 IN | SYSTOLIC BLOOD PRESSURE: 113 MMHG | DIASTOLIC BLOOD PRESSURE: 69 MMHG | HEART RATE: 69 BPM

## 2023-02-15 DIAGNOSIS — M25.512 ACUTE PAIN OF LEFT SHOULDER: ICD-10-CM

## 2023-02-15 PROCEDURE — 3074F PR MOST RECENT SYSTOLIC BLOOD PRESSURE < 130 MM HG: ICD-10-PCS | Mod: HCNC,CPTII,S$GLB, | Performed by: ORTHOPAEDIC SURGERY

## 2023-02-15 PROCEDURE — 1125F AMNT PAIN NOTED PAIN PRSNT: CPT | Mod: HCNC,CPTII,S$GLB, | Performed by: ORTHOPAEDIC SURGERY

## 2023-02-15 PROCEDURE — 1159F PR MEDICATION LIST DOCUMENTED IN MEDICAL RECORD: ICD-10-PCS | Mod: HCNC,CPTII,S$GLB, | Performed by: ORTHOPAEDIC SURGERY

## 2023-02-15 PROCEDURE — 1101F PR PT FALLS ASSESS DOC 0-1 FALLS W/OUT INJ PAST YR: ICD-10-PCS | Mod: HCNC,CPTII,S$GLB, | Performed by: ORTHOPAEDIC SURGERY

## 2023-02-15 PROCEDURE — 3288F PR FALLS RISK ASSESSMENT DOCUMENTED: ICD-10-PCS | Mod: HCNC,CPTII,S$GLB, | Performed by: ORTHOPAEDIC SURGERY

## 2023-02-15 PROCEDURE — 73030 XR SHOULDER COMPLETE 2 OR MORE VIEWS LEFT: ICD-10-PCS | Mod: 26,HCNC,LT, | Performed by: RADIOLOGY

## 2023-02-15 PROCEDURE — 99999 PR PBB SHADOW E&M-EST. PATIENT-LVL III: ICD-10-PCS | Mod: PBBFAC,HCNC,, | Performed by: ORTHOPAEDIC SURGERY

## 2023-02-15 PROCEDURE — 73030 X-RAY EXAM OF SHOULDER: CPT | Mod: TC,HCNC,LT

## 2023-02-15 PROCEDURE — 73030 X-RAY EXAM OF SHOULDER: CPT | Mod: 26,HCNC,LT, | Performed by: RADIOLOGY

## 2023-02-15 PROCEDURE — 3078F DIAST BP <80 MM HG: CPT | Mod: HCNC,CPTII,S$GLB, | Performed by: ORTHOPAEDIC SURGERY

## 2023-02-15 PROCEDURE — 1101F PT FALLS ASSESS-DOCD LE1/YR: CPT | Mod: HCNC,CPTII,S$GLB, | Performed by: ORTHOPAEDIC SURGERY

## 2023-02-15 PROCEDURE — 99204 PR OFFICE/OUTPT VISIT, NEW, LEVL IV, 45-59 MIN: ICD-10-PCS | Mod: HCNC,S$GLB,, | Performed by: ORTHOPAEDIC SURGERY

## 2023-02-15 PROCEDURE — 3074F SYST BP LT 130 MM HG: CPT | Mod: HCNC,CPTII,S$GLB, | Performed by: ORTHOPAEDIC SURGERY

## 2023-02-15 PROCEDURE — 99204 OFFICE O/P NEW MOD 45 MIN: CPT | Mod: HCNC,S$GLB,, | Performed by: ORTHOPAEDIC SURGERY

## 2023-02-15 PROCEDURE — 1125F PR PAIN SEVERITY QUANTIFIED, PAIN PRESENT: ICD-10-PCS | Mod: HCNC,CPTII,S$GLB, | Performed by: ORTHOPAEDIC SURGERY

## 2023-02-15 PROCEDURE — 3288F FALL RISK ASSESSMENT DOCD: CPT | Mod: HCNC,CPTII,S$GLB, | Performed by: ORTHOPAEDIC SURGERY

## 2023-02-15 PROCEDURE — 3078F PR MOST RECENT DIASTOLIC BLOOD PRESSURE < 80 MM HG: ICD-10-PCS | Mod: HCNC,CPTII,S$GLB, | Performed by: ORTHOPAEDIC SURGERY

## 2023-02-15 PROCEDURE — 99999 PR PBB SHADOW E&M-EST. PATIENT-LVL III: CPT | Mod: PBBFAC,HCNC,, | Performed by: ORTHOPAEDIC SURGERY

## 2023-02-15 PROCEDURE — 1159F MED LIST DOCD IN RCRD: CPT | Mod: HCNC,CPTII,S$GLB, | Performed by: ORTHOPAEDIC SURGERY

## 2023-02-15 NOTE — PROGRESS NOTES
CC: left shoulder pain, patient is retired, referred by Dr. Gomez     77 y.o. Male RHD reports that the pain is severe and not responding to any conservative care.      Pain has been present for about 1 month  Denies any injury or trauma or change in activity     He reports that the pain is worse with overhead activity. It also bothers him at night.  He notes pain with most movements and at times has to assist himself with his non involved arm when lifting    He describes his pain as anterior and posterior     Is affecting ADLs.     SANE: 50  Pain today 7/10    He notes a previous right shoulder rotator cuff repair back in 2015     Review of Systems   Constitution: Negative. Negative for chills, fever and night sweats.   HENT: Negative for congestion and headaches.    Eyes: Negative for blurred vision, left vision loss and right vision loss.   Cardiovascular: Negative for chest pain and syncope.   Respiratory: Negative for cough and shortness of breath.    Endocrine: Negative for polydipsia, polyphagia and polyuria.   Hematologic/Lymphatic: Negative for bleeding problem. Does not bruise/bleed easily.   Skin: Negative for dry skin, itching and rash.   Musculoskeletal: Negative for falls and muscle weakness.   Gastrointestinal: Negative for abdominal pain and bowel incontinence.   Genitourinary: Negative for bladder incontinence and nocturia.   Neurological: Negative for disturbances in coordination, loss of balance and seizures.   Psychiatric/Behavioral: Negative for depression. The patient does not have insomnia.    Allergic/Immunologic: Negative for hives and persistent infections.     PAST MEDICAL HISTORY:   Past Medical History:   Diagnosis Date    Cancer     Hyperlipidemia     Hypertension      PAST SURGICAL HISTORY:   Past Surgical History:   Procedure Laterality Date    SHOULDER SURGERY       FAMILY HISTORY:   Family History   Problem Relation Age of Onset    Heart attack Mother      SOCIAL HISTORY:  "  Social History     Socioeconomic History    Marital status:    Tobacco Use    Smoking status: Never    Smokeless tobacco: Never   Substance and Sexual Activity    Alcohol use: Yes     Comment: social       MEDICATIONS:   Current Outpatient Medications:     amLODIPine (NORVASC) 10 MG tablet, TAKE 1 TABLET EVERY DAY, Disp: 90 tablet, Rfl: 3    aspirin-calcium carbonate 81 mg-300 mg calcium(777 mg) Tab, aspirin 81 mg tablet  Take by oral route., Disp: , Rfl:     finasteride (PROSCAR) 5 mg tablet, TAKE 1 TABLET EVERY DAY, Disp: 90 tablet, Rfl: 3    irbesartan (AVAPRO) 300 MG tablet, TAKE 1 TABLET EVERY DAY, Disp: 90 tablet, Rfl: 1    multivitamin capsule, Take 1 capsule by mouth once daily., Disp: , Rfl:     omeprazole (PRILOSEC) 20 MG capsule, TAKE 1 CAPSULE EVERY DAY, Disp: 90 capsule, Rfl: 3    rosuvastatin (CRESTOR) 10 MG tablet, TAKE 1 TABLET EVERY DAY, Disp: 90 tablet, Rfl: 1    tamsulosin (FLOMAX) 0.4 mg Cap, TAKE 1 CAPSULE EVERY DAY, Disp: 90 capsule, Rfl: 3    vit A/vit C/vit E/zinc/copper (ICAPS AREDS ORAL), Take by mouth., Disp: , Rfl:     vitamin D (VITAMIN D3) 1000 units Tab, Take 1,000 Units by mouth once daily. 2,000 iu daily, Disp: , Rfl:     sildenafiL (VIAGRA) 100 MG tablet, Take 1 tablet (100 mg total) by mouth daily as needed for Erectile Dysfunction., Disp: 10 tablet, Rfl: 1  ALLERGIES:   Review of patient's allergies indicates:   Allergen Reactions    Penicillins     Sulfa (sulfonamide antibiotics)     Sulfur Hives       VITAL SIGNS: /69   Pulse 69   Ht 5' 6" (1.676 m)   Wt 74.4 kg (164 lb)   BMI 26.47 kg/m²      PHYSICAL EXAMINATION:  General:  The patient is alert and oriented x 3.  Mood is pleasant.  Observation of ears, eyes and nose reveal no gross abnormalities.  No labored breathing observed.  Gait is coordinated. Patient can toe walk and heel walk without difficulty.      left SHOULDER / UPPER EXTREMITY EXAM    OBSERVATION:  "    Swelling  none  Deformity  none   Discoloration  none   Scapular winging none   Scars   none  Atrophy  none    TENDERNESS / CREPITUS (T/C):          T/C      T/C   Clavicle   -/-  SUPRAspinatus    -/-     AC Jt.    -/-  INFRAspinatus  -/-    SC Jt.    -/-  Deltoid    -/-      G. Tuberosity  -/-  LH BICEP groove  +/-   Acromion:  -/-  Midline Neck   -/-     Scapular Spine -/-  Trapezium   -/-   SMA Scapula  -/-  GH jt. line - post  -/-     Scapulothoracic  -/-         ROM: (* = with pain)  Right shoulder   Left shoulder        AROM (PROM)   AROM (PROM)   FE    170° (175°)     170° (175°)     ER at 0°    40°  (45°)    30°  (35°)   ER at 90° ABD  90°  (90°)    90°  (90°)   IR at 90°  ABD   NA  (40°)     NA  (40°)      IR (spine level)   T10     T12    STRENGTH: (* = with pain) RIGHT SHOULDER  LEFT SHOULDER   SCAPTION at 0  5/5    4/5   SCAPTION at 30  5/5    4+/5    IR    5/5    5/5   ER    5/5    5-/5   BICEPS   5/5    5/5   Deltoid    5/5    5/5     SIGNS:  Painful side       NEER   +    OLEONORS  +    LOPES   +    SPEEDS  +     DROP ARM   neg   BELLY PRESS neg   Superior escape none    LIFT-OFF  neg   X-Body ADD    neg    MOVING VALGUS neg        STABILITY TESTING    RIGHT SHOULDER   LEFT SHOULDER     Translation     Anterior  up face     up face    Posterior  up face    up face    Sulcus   < 10mm    < 10 mm     Signs   Apprehension   neg      neg       Relocation   no change     no change      Jerk test  neg     neg    EXTREMITY NEURO-VASCULAR EXAM    Sensation grossly intact to light touch all dermatomal regions.    DTR 2+ Biceps, Triceps, BR and Negative Alyssas sign   Grossly intact motor function at Elbow, Wrist and Hand   Distal pulses radial and ulnar 2+, brisk cap refill, symmetric.      NECK:  Painless FROM and spinous processes non-tender. Negative Spurlings sign.      OTHER FINDINGS:  + scapular dyskinesia  Bear Hug: + for pain and weakness     XRAYS reviewed and interpreted personally by  me:  Shoulder trauma series left,  were obtained and reviewed  Mild moderate DJD glenohumeral joint particularly inferiorly.  Hypertrophic change cortex anterior posterior humeral head and greater tuberosity region.  No fracture dislocation.  Small inferior spur formation at level of AC joint encroaching on sub acromial space.    I made the decision to obtain old records of the patient including previous notes and imaging. New imaging was ordered today of the extremity or extremities evaluated. I independently reviewed and interpreted the radiographs and/or MRIs today as well as prior imaging.      ASSESSMENT:  left:  1. Shoulder probable rotator cuff tear       PLAN:  I do think that this is likely a rotator cuff tear.    I have recommended we check an MRI of the shoulder to evaluate this.    Depending on the results of the MRI, we may consider a cortisone   injection and physical therapy and/or arthroscopic intervention and treatment   depending on what we find.      All questions were answered, pt will contact us for questions or concerns in the interim.

## 2023-02-20 ENCOUNTER — HOSPITAL ENCOUNTER (OUTPATIENT)
Dept: RADIOLOGY | Facility: HOSPITAL | Age: 78
Discharge: HOME | End: 2023-02-20
Attending: ORTHOPAEDIC SURGERY
Payer: MEDICARE

## 2023-02-20 DIAGNOSIS — M25.512 ACUTE PAIN OF LEFT SHOULDER: ICD-10-CM

## 2023-02-20 PROCEDURE — 73221 MRI SHOULDER WITHOUT CONTRAST LEFT: ICD-10-PCS | Mod: 26,HCNC,LT, | Performed by: RADIOLOGY

## 2023-02-20 PROCEDURE — 73221 MRI JOINT UPR EXTREM W/O DYE: CPT | Mod: TC,HCNC,LT

## 2023-02-20 PROCEDURE — 73221 MRI JOINT UPR EXTREM W/O DYE: CPT | Mod: 26,HCNC,LT, | Performed by: RADIOLOGY

## 2023-02-22 ENCOUNTER — TELEPHONE (OUTPATIENT)
Dept: SPORTS MEDICINE | Facility: CLINIC | Age: 78
End: 2023-02-22
Payer: MEDICARE

## 2023-02-22 NOTE — TELEPHONE ENCOUNTER
I called and spoke to the patient regarding his MRI Results. I personally reviewed the MRI with Dr. Lutz and it shows Large rotator cuff tear, SLAP tear, and biceps tendonitis. After discussion with the patient he understands and would like to consider his options at this time.      ASSESSMENT:    Left Shoulder Rotator Cuff Tear, SLAP, biceps tendonitis.      he would benefit from an arthroscopy, given the above.       PLAN:   Left Shoulder rotator cuff tear     All questions were answered, pt will contact us for questions or concerns in the interim.  Had thorough discussion of non-operative vs operative intervention, and risks and benefits of both.     We have discussed the surgery and recovery of arthroscopic shoulder surgery. he understands that there may be limited mobility up to several weeks after surgery depending on procedures that are performed at the time of surgery.    The spectrum of treatment options were discussed with the patient, including nonoperative and operative options.  After thorough discussion, the patient would like to consider his options. His surgical plan would include:    left   a. Shoulder arthroscopic rotator cuff repair with Cuffmend vs. Tuberoplasty with cable reconstruction   b. Shoulder arthroscopic SAD   c. Shoulder arthroscopic extensive debridement   d. Shoulder arthroscopic biceps tenodesis (vs. subpect tenodesis)   e. Shoulder arthroscopic possible microfracture   f.  Shoulder open reduction internal fixation greater tuberosity    The details of the surgical procedure were explained, including the location of probable incisions and a description of likely hardware and/or grafts to be used.  The patient understands the likely convalescence after surgery.  Also, we have thoroughly discussed the risks, benefits and alternatives to surgery, including, but not limited to, the risk of infection, joint stiffness, blood clot (including DVT and/or pulmonary embolus), neurologic and  vascular injury.  It was explained that, if tissue has been repaired or reconstructed, there is a chance of failure, which may require further management.

## 2023-03-09 ENCOUNTER — LAB VISIT (OUTPATIENT)
Dept: LAB | Facility: HOSPITAL | Age: 78
End: 2023-03-09
Payer: MEDICARE

## 2023-03-09 DIAGNOSIS — E83.52 HYPERCALCEMIA: ICD-10-CM

## 2023-03-09 LAB
ALBUMIN SERPL BCP-MCNC: 4.4 G/DL (ref 3.5–5.2)
ANION GAP SERPL CALC-SCNC: 13 MMOL/L (ref 8–16)
BUN SERPL-MCNC: 14 MG/DL (ref 8–23)
CALCIUM SERPL-MCNC: 11.2 MG/DL (ref 8.7–10.5)
CHLORIDE SERPL-SCNC: 103 MMOL/L (ref 95–110)
CO2 SERPL-SCNC: 26 MMOL/L (ref 23–29)
CREAT SERPL-MCNC: 1.1 MG/DL (ref 0.5–1.4)
EST. GFR  (NO RACE VARIABLE): >60 ML/MIN/1.73 M^2
GLUCOSE SERPL-MCNC: 92 MG/DL (ref 70–110)
PHOSPHATE SERPL-MCNC: 2.2 MG/DL (ref 2.7–4.5)
POTASSIUM SERPL-SCNC: 5.3 MMOL/L (ref 3.5–5.1)
PTH-INTACT SERPL-MCNC: 87.2 PG/ML (ref 9–77)
SODIUM SERPL-SCNC: 142 MMOL/L (ref 136–145)

## 2023-03-09 PROCEDURE — 83970 ASSAY OF PARATHORMONE: CPT | Mod: HCNC | Performed by: STUDENT IN AN ORGANIZED HEALTH CARE EDUCATION/TRAINING PROGRAM

## 2023-03-09 PROCEDURE — 80069 RENAL FUNCTION PANEL: CPT | Mod: HCNC | Performed by: STUDENT IN AN ORGANIZED HEALTH CARE EDUCATION/TRAINING PROGRAM

## 2023-03-09 PROCEDURE — 36415 COLL VENOUS BLD VENIPUNCTURE: CPT | Mod: HCNC,PO | Performed by: STUDENT IN AN ORGANIZED HEALTH CARE EDUCATION/TRAINING PROGRAM

## 2023-03-09 PROCEDURE — 82306 VITAMIN D 25 HYDROXY: CPT | Mod: HCNC | Performed by: STUDENT IN AN ORGANIZED HEALTH CARE EDUCATION/TRAINING PROGRAM

## 2023-03-10 LAB — 25(OH)D3+25(OH)D2 SERPL-MCNC: 42 NG/ML (ref 30–96)

## 2023-03-14 NOTE — PROGRESS NOTES
Subjective:      Chief Complaint: Primary hyperparathyroidism    History of Present Illness  77 y.o. male with hyperlipidemia, hypertension, daily alcohol use, erectile dysfunction, and history of prostate cancer presents for continued evaluation of hypercalcemia.    - Occupation: Retired Restaurateur    - Interval history: Last seen by Dr. Mendieta on 12/13/2022 at which time patient was on hydrochlorothiazide which was held for 3 months and labs were redrawn just prior to this current visit.    Regarding Primary Hyperparathyroidism:    - Notable for elevated calcium levels since 2019    Lab Results   Component Value Date    PTH 87.2 (H) 03/09/2023    PTH 81 (H) 07/25/2022    CALCIUM 11.2 (H) 03/09/2023    CALCIUM 10.4 (H) 07/25/2022    CALCIUM 11.1 (H) 07/19/2022    ALBUMIN 4.4 03/09/2023    ALBUMIN 5.2 (H) 07/19/2022    ALBUMIN 4.7 11/04/2020    PHOS 2.2 (L) 03/09/2023    ALKPHOS 62 07/19/2022    ALKPHOS 63 11/04/2020    ALKPHOS 54 (L) 09/23/2019    NDMDSVER13XT 42 03/09/2023    EGFRNORACEVR >60.0 03/09/2023    EGFRNORACEVR 75 07/25/2022     - Daily intake of calcium: None  - Daily intake of vitamin D: Cholecalciferol 2000 IU daily  - Most recent DEXA: None  - Patient currently consumes about half a fifth of vodka a day. He was advised to try and decrease alcohol intake as tolerated to as little as possible    - Surgical indications: None as of yet but need to screen for osteoporosis and hypercalciuria  - Does not meet surgical indication at this time: No nephrolithiasis, hypercalcuria (24 hour urine calcium level greater than 400 mg/dL), impaired renal function (GFR less than 60 mL/minute), Osteoporosis (BDS less than -2.5), fragility fracture, or asymptomatic vertebral compression fracture, serum calcium level greater than or equal to 11.5  No   Yes  [x]    []  Nephrolithiasis  []    []  Hypercalcuria (24 hour urine calcium level greater than 400 mg/dL)  [x]    []  Impaired renal function (GFR less than 60  mL/minute)  []    []  Osteoporosis (BDS less than -2.5, fragility fracture, or FRAX [>20% or >3% respectively])  [x]    []  Serum calcium level greater than or equal to 11.5    - Current symptoms: Patient denies neuro symptoms, depression, mental fog, anxiety, polyuria, polydipsia, nausea/vomiting, constipation, history of pancreatitis, GERD with frequent Tums, muscle weakness, bone pain, fatigue    - I independently reviewed all pertinent outside records and imaging    Objective:   /70   Pulse 74   Resp 16   Wt 73.9 kg (163 lb)   SpO2 99%   BMI 26.31 kg/m²   Physical Exam  Constitutional:       Appearance: Normal appearance.   Pulmonary:      Effort: Pulmonary effort is normal.   Neurological:      General: No focal deficit present.      Mental Status: He is alert and oriented to person, place, and time.   Psychiatric:         Mood and Affect: Mood normal.         Behavior: Behavior normal.     BP Readings from Last 1 Encounters:   03/16/23 126/70      Wt Readings from Last 1 Encounters:   03/16/23 0900 73.9 kg (163 lb)     Body mass index is 26.31 kg/m².    Lab Review:   No results found for: HGBA1C  Lab Results   Component Value Date    CHOL 272 (H) 07/19/2022     (H) 07/19/2022    LDLCALC 96 07/19/2022    TRIG 191 (H) 07/19/2022    CHOLHDL 40.6 09/23/2019     Lab Results   Component Value Date     03/09/2023    K 5.3 (H) 03/09/2023     03/09/2023    CO2 26 03/09/2023    GLU 92 03/09/2023    BUN 14 03/09/2023    CREATININE 1.1 03/09/2023    CALCIUM 11.2 (H) 03/09/2023    PROT 7.7 07/19/2022    ALBUMIN 4.4 03/09/2023    BILITOT 0.6 07/19/2022    ALKPHOS 62 07/19/2022    AST 39 07/19/2022    ALT 30 07/19/2022    ANIONGAP 13 03/09/2023    ESTGFRAFRICA >60.0 09/23/2019    EGFRNONAA >60.0 09/23/2019       All pertinent labs reviewed    Assessment and Plan     Primary hyperparathyroidism  - Primary hyperparathyroidism confirmed on labs after patient off hydrochlorothiazide for 3 months; no  surgical indication as of yet though patient will likely be excellent candidate given his high functional status currently    - Obtain 24 hour urine collection to assess for hypercalciuria which may be a surgical indication for primary hyperparathyroidism; will obtain renal panel the same day as urine collection return  - Obtain DEXA scan with appendicular skeleton as well to assess for osteoporosis which would be a surgical indication for primary hyperparathyroidism    - If either the 24 hour urine collection or DEXA scan indicates surgical intervention and if patient agreeable to surgical cure, will then obtain thyroid ultrasound to assess for potential parathyroid adenoma as well as parathyroid uptake and scan followed by Endocrine surgery referral    Elevated MCV  - Daily alcohol use is likely cause; encouraged patient to decrease alcohol intake as tolerated to as little as possible  - Further management per PCP    Mixed hyperlipidemia  - Continue rosuvastatin 10 mg daily   - Further management per PCP    Marvin Shannon DO  [facility], [address]  [address] [place], [place], [address]    Office: [phone]  Fax: [phone]    Disclaimer: This note has been generated using voice-recognition software. There may be typographical errors that have been missed during proof-reading.    The above history labs imaging impression and plan were discussed with attending physician who is in agreement and also took part in this patient's care.  I personally reviewed all of the patients available medications, labs, imaging, vitals, allergies, medical history

## 2023-03-15 PROBLEM — E21.0 PRIMARY HYPERPARATHYROIDISM: Status: ACTIVE | Noted: 2023-01-18

## 2023-03-15 PROBLEM — E83.52 HYPERCALCEMIA: Status: RESOLVED | Noted: 2022-12-13 | Resolved: 2023-03-15

## 2023-03-15 NOTE — ASSESSMENT & PLAN NOTE
- Primary hyperparathyroidism confirmed on labs after patient off hydrochlorothiazide for 3 months; no surgical indication as of yet though patient will likely be excellent candidate given his high functional status currently    - Obtain 24 hour urine collection to assess for hypercalciuria which may be a surgical indication for primary hyperparathyroidism; will obtain renal panel the same day as urine collection return  - Obtain DEXA scan with appendicular skeleton as well to assess for osteoporosis which would be a surgical indication for primary hyperparathyroidism    - If either the 24 hour urine collection or DEXA scan indicates surgical intervention and if patient agreeable to surgical cure, will then obtain thyroid ultrasound to assess for potential parathyroid adenoma as well as parathyroid uptake and scan followed by Endocrine surgery referral

## 2023-03-15 NOTE — ASSESSMENT & PLAN NOTE
- Daily alcohol use is likely cause; encouraged patient to decrease alcohol intake as tolerated to as little as possible  - Further management per PCP

## 2023-03-16 ENCOUNTER — OFFICE VISIT (OUTPATIENT)
Dept: ENDOCRINOLOGY | Facility: CLINIC | Age: 78
End: 2023-03-16
Payer: MEDICARE

## 2023-03-16 VITALS
BODY MASS INDEX: 26.31 KG/M2 | WEIGHT: 163 LBS | DIASTOLIC BLOOD PRESSURE: 70 MMHG | SYSTOLIC BLOOD PRESSURE: 126 MMHG | HEART RATE: 74 BPM | RESPIRATION RATE: 16 BRPM | OXYGEN SATURATION: 99 %

## 2023-03-16 DIAGNOSIS — E78.2 MIXED HYPERLIPIDEMIA: ICD-10-CM

## 2023-03-16 DIAGNOSIS — E21.0 PRIMARY HYPERPARATHYROIDISM: ICD-10-CM

## 2023-03-16 DIAGNOSIS — R71.8 ELEVATED MCV: ICD-10-CM

## 2023-03-16 DIAGNOSIS — E83.52 HYPERCALCEMIA: Primary | ICD-10-CM

## 2023-03-16 PROCEDURE — 3074F SYST BP LT 130 MM HG: CPT | Mod: HCNC,CPTII,GC,S$GLB | Performed by: STUDENT IN AN ORGANIZED HEALTH CARE EDUCATION/TRAINING PROGRAM

## 2023-03-16 PROCEDURE — 3078F DIAST BP <80 MM HG: CPT | Mod: HCNC,CPTII,GC,S$GLB | Performed by: STUDENT IN AN ORGANIZED HEALTH CARE EDUCATION/TRAINING PROGRAM

## 2023-03-16 PROCEDURE — 1159F PR MEDICATION LIST DOCUMENTED IN MEDICAL RECORD: ICD-10-PCS | Mod: HCNC,CPTII,GC,S$GLB | Performed by: STUDENT IN AN ORGANIZED HEALTH CARE EDUCATION/TRAINING PROGRAM

## 2023-03-16 PROCEDURE — 3078F PR MOST RECENT DIASTOLIC BLOOD PRESSURE < 80 MM HG: ICD-10-PCS | Mod: HCNC,CPTII,GC,S$GLB | Performed by: STUDENT IN AN ORGANIZED HEALTH CARE EDUCATION/TRAINING PROGRAM

## 2023-03-16 PROCEDURE — 99214 OFFICE O/P EST MOD 30 MIN: CPT | Mod: HCNC,GC,S$GLB, | Performed by: STUDENT IN AN ORGANIZED HEALTH CARE EDUCATION/TRAINING PROGRAM

## 2023-03-16 PROCEDURE — 1159F MED LIST DOCD IN RCRD: CPT | Mod: HCNC,CPTII,GC,S$GLB | Performed by: STUDENT IN AN ORGANIZED HEALTH CARE EDUCATION/TRAINING PROGRAM

## 2023-03-16 PROCEDURE — 1125F AMNT PAIN NOTED PAIN PRSNT: CPT | Mod: HCNC,CPTII,GC,S$GLB | Performed by: STUDENT IN AN ORGANIZED HEALTH CARE EDUCATION/TRAINING PROGRAM

## 2023-03-16 PROCEDURE — 99214 PR OFFICE/OUTPT VISIT, EST, LEVL IV, 30-39 MIN: ICD-10-PCS | Mod: HCNC,GC,S$GLB, | Performed by: STUDENT IN AN ORGANIZED HEALTH CARE EDUCATION/TRAINING PROGRAM

## 2023-03-16 PROCEDURE — 3074F PR MOST RECENT SYSTOLIC BLOOD PRESSURE < 130 MM HG: ICD-10-PCS | Mod: HCNC,CPTII,GC,S$GLB | Performed by: STUDENT IN AN ORGANIZED HEALTH CARE EDUCATION/TRAINING PROGRAM

## 2023-03-16 PROCEDURE — 1160F PR REVIEW ALL MEDS BY PRESCRIBER/CLIN PHARMACIST DOCUMENTED: ICD-10-PCS | Mod: HCNC,CPTII,GC,S$GLB | Performed by: STUDENT IN AN ORGANIZED HEALTH CARE EDUCATION/TRAINING PROGRAM

## 2023-03-16 PROCEDURE — 1125F PR PAIN SEVERITY QUANTIFIED, PAIN PRESENT: ICD-10-PCS | Mod: HCNC,CPTII,GC,S$GLB | Performed by: STUDENT IN AN ORGANIZED HEALTH CARE EDUCATION/TRAINING PROGRAM

## 2023-03-16 PROCEDURE — 1160F RVW MEDS BY RX/DR IN RCRD: CPT | Mod: HCNC,CPTII,GC,S$GLB | Performed by: STUDENT IN AN ORGANIZED HEALTH CARE EDUCATION/TRAINING PROGRAM

## 2023-03-16 PROCEDURE — 99999 PR PBB SHADOW E&M-EST. PATIENT-LVL V: CPT | Mod: PBBFAC,HCNC,GC, | Performed by: STUDENT IN AN ORGANIZED HEALTH CARE EDUCATION/TRAINING PROGRAM

## 2023-03-16 PROCEDURE — 99999 PR PBB SHADOW E&M-EST. PATIENT-LVL V: ICD-10-PCS | Mod: PBBFAC,HCNC,GC, | Performed by: STUDENT IN AN ORGANIZED HEALTH CARE EDUCATION/TRAINING PROGRAM

## 2023-03-16 NOTE — PROGRESS NOTES
I have reviewed and concur with Dr. Reno's history, physical, assessment, and plan.  I have personally interviewed and examined the patient.  See below addendum for my evaluation and additional findings.    Patient with labs consistent with primary hyperparathyroidism as PTH and calcium remain elevated with low phos despite being off thiazide diuretic.  Will need to check 24 hour urine calcium and bone density including distal radius.  Currently does not meet criteria for surgery but will consider surgery if osteoporosis or significantly elevated urine calcium.  We discussed that if he does meet surgical criteria will need to get neck imaging for localization prior to having him meet with endocrine surgeon.  If he does not meet surgical criteria he will continue to stay well hydrated and will continue to monitor with repeat labs in 6 month(s)     Shimon Krishnan MD

## 2023-03-16 NOTE — PATIENT INSTRUCTIONS
- 24 hour urine collection to assess for high calcium in the urine which may be a surgical indication, obtain repeat blood work the day of urine return to check blood calcium  - Schedule DEXA bone density scan with forearm scan as osteoporosis would be a surgical indication    - If either the 24 hour urine or DEXA scan demonstrates surgical indication, will then obtain thyroid ultrasound to assess for parathyroid adenoma and make referral to Endocrine surgery to discuss surgical options    - If no indication or desire for surgery, would then obtain blood work every 6 months to monitor calcium levels    - Please make sure to remain well-hydrated and drink plenty of water to help keep the high calcium under control    Marvin Shannon DO  [facility], 6th Floor  [address] [place], [address]    Office: [phone]  Fax: [phone]      HOW TO COLLECT AN ACCURATE 24 HOUR URINE SPECIMEN     - The first urine of the day upon waking when you start the 24 hour collection should be flushed down the toilet    - Afterwards, collect EVERY drop of your urine for a 24 hour period. The final total volume is not important as long as it consists of every drop that you produce    - After collecting urine for 24 hours, bring the closed container back to the same lab      INTRODUCTION OF HYPERPARATHYROIDISM    - The parathyroid glands are 4 tiny glands in the neck. They make parathyroid hormone (PTH). PTH controls the amount of calcium and phosphorus in your blood. Primary hyperparathyroidism is when there is too much PTH in your blood. It occurs when one or more of the glands are too active    - The job of PTH is to tell the body how to control calcium. Too much PTH means the body increases the amount of calcium in the blood. This leads to a problem called hypercalcemia. This is when the amount of calcium in the blood is too high. Hypercalcemia can cause serious health  problems      SYMPTOMS OF HYPERPARATHYROIDISM    - Muscle weakness, depression, tiredness, confusion and memory loss, poor memory, nausea/vomiting, abdominal pain, constipation, stomach ulcers, need to urinate often, kidney stones, joint or bone pain, bone disease (osteopenia or osteoporosis), high blood pressure, increased thirst      CAUSES OF HYPERPARATHYROIDISM    - Hyperparathyroidism can occur when a parathyroid gland becomes enlarged. It can also occur as a complication of another health conditions, such as kidney failure or rickets. In these conditions, calcium is usually not high. This is called secondary hyperparathyroidism      TREATMENT OF HYPERPARATHYROIDISM    - Surgery: This may be done to remove any enlarged parathyroid glands and allows the calcium in the blood to go back to normal. You may need to take vitamin D and calcium supplements before the surgery to reduce the risk of low calcium afterwards     - Medicine: lowers the amount of parathyroid hormone made by the overactive glands

## 2023-03-17 ENCOUNTER — LAB VISIT (OUTPATIENT)
Dept: LAB | Facility: HOSPITAL | Age: 78
End: 2023-03-17
Payer: MEDICARE

## 2023-03-17 DIAGNOSIS — E21.0 PRIMARY HYPERPARATHYROIDISM: ICD-10-CM

## 2023-03-17 LAB
CALCIUM 24H UR-MRATE: NORMAL MG/HR (ref 4–12)
CALCIUM UR-MCNC: <2 MG/DL (ref 0–15)
CALCIUM URINE (MG/SPEC): NORMAL MG/SPEC
CREAT 24H UR-MRATE: 52.7 MG/HR (ref 40–75)
CREAT UR-MCNC: 55 MG/DL (ref 23–375)
CREATININE, URINE (MG/SPEC): 1265 MG/SPEC
URINE COLLECTION DURATION: 24 HR
URINE COLLECTION DURATION: 24 HR
URINE VOLUME: 2300 ML
URINE VOLUME: 2300 ML

## 2023-03-17 PROCEDURE — 82570 ASSAY OF URINE CREATININE: CPT | Mod: HCNC | Performed by: STUDENT IN AN ORGANIZED HEALTH CARE EDUCATION/TRAINING PROGRAM

## 2023-03-17 PROCEDURE — 82340 ASSAY OF CALCIUM IN URINE: CPT | Mod: HCNC | Performed by: STUDENT IN AN ORGANIZED HEALTH CARE EDUCATION/TRAINING PROGRAM

## 2023-03-20 ENCOUNTER — LAB VISIT (OUTPATIENT)
Dept: LAB | Facility: HOSPITAL | Age: 78
End: 2023-03-20
Payer: MEDICARE

## 2023-03-20 DIAGNOSIS — E83.52 HYPERCALCEMIA: ICD-10-CM

## 2023-03-20 LAB
ALBUMIN SERPL BCP-MCNC: 4.1 G/DL (ref 3.5–5.2)
ANION GAP SERPL CALC-SCNC: 10 MMOL/L (ref 8–16)
BUN SERPL-MCNC: 16 MG/DL (ref 8–23)
CALCIUM SERPL-MCNC: 11 MG/DL (ref 8.7–10.5)
CHLORIDE SERPL-SCNC: 101 MMOL/L (ref 95–110)
CO2 SERPL-SCNC: 26 MMOL/L (ref 23–29)
CREAT SERPL-MCNC: 1 MG/DL (ref 0.5–1.4)
EST. GFR  (NO RACE VARIABLE): >60 ML/MIN/1.73 M^2
GLUCOSE SERPL-MCNC: 124 MG/DL (ref 70–110)
PHOSPHATE SERPL-MCNC: 2.4 MG/DL (ref 2.7–4.5)
POTASSIUM SERPL-SCNC: 4.4 MMOL/L (ref 3.5–5.1)
SODIUM SERPL-SCNC: 137 MMOL/L (ref 136–145)

## 2023-03-20 PROCEDURE — 80069 RENAL FUNCTION PANEL: CPT | Mod: HCNC | Performed by: STUDENT IN AN ORGANIZED HEALTH CARE EDUCATION/TRAINING PROGRAM

## 2023-03-20 PROCEDURE — 36415 COLL VENOUS BLD VENIPUNCTURE: CPT | Mod: HCNC,PO | Performed by: STUDENT IN AN ORGANIZED HEALTH CARE EDUCATION/TRAINING PROGRAM

## 2023-03-23 ENCOUNTER — PATIENT MESSAGE (OUTPATIENT)
Dept: ENDOCRINOLOGY | Facility: CLINIC | Age: 78
End: 2023-03-23
Payer: MEDICARE

## 2023-03-23 ENCOUNTER — TELEPHONE (OUTPATIENT)
Dept: ENDOCRINOLOGY | Facility: CLINIC | Age: 78
End: 2023-03-23
Payer: MEDICARE

## 2023-03-23 DIAGNOSIS — E83.52 HYPERCALCEMIA: Primary | ICD-10-CM

## 2023-03-29 LAB
CALCIUM 24H UR-MRATE: 100 MG/24 H
CREAT 24H UR-MRATE: 1.4 G/24 H (ref 0.5–2.15)

## 2023-05-03 ENCOUNTER — APPOINTMENT (OUTPATIENT)
Dept: RADIOLOGY | Facility: CLINIC | Age: 78
End: 2023-05-03
Attending: STUDENT IN AN ORGANIZED HEALTH CARE EDUCATION/TRAINING PROGRAM
Payer: MEDICARE

## 2023-05-03 DIAGNOSIS — E21.0 PRIMARY HYPERPARATHYROIDISM: ICD-10-CM

## 2023-05-03 PROCEDURE — 77080 DXA BONE DENSITY AXIAL SKELETON 1 OR MORE SITES: ICD-10-PCS | Mod: 26,HCNC,, | Performed by: INTERNAL MEDICINE

## 2023-05-03 PROCEDURE — 77080 DXA BONE DENSITY AXIAL: CPT | Mod: TC,HCNC,PO

## 2023-05-03 PROCEDURE — 77081 DEXA BONE DENSITY APPENDICULAR SKELETON: ICD-10-PCS | Mod: 26,HCNC,, | Performed by: INTERNAL MEDICINE

## 2023-05-03 PROCEDURE — 77080 DXA BONE DENSITY AXIAL: CPT | Mod: 26,HCNC,, | Performed by: INTERNAL MEDICINE

## 2023-05-03 PROCEDURE — 77081 DXA BONE DENSITY APPENDICULR: CPT | Mod: TC,HCNC,PO

## 2023-05-03 PROCEDURE — 77081 DXA BONE DENSITY APPENDICULR: CPT | Mod: 26,HCNC,, | Performed by: INTERNAL MEDICINE

## 2023-05-10 NOTE — PROGRESS NOTES
"Gentleman with primary hyperparathyroidism that we had seen a few months ago.  He did not have any surgical indications but had not yet received formal workup.      His 24 hour urine collection was not adequate.    DEXA scan returns today showing osteoporosis only by FRAX with a hip score of 3% which is higher because he answered "no" to drinking 3 or more alcoholic drinks per day on the questionnaire (he told me he drinks about a half a 5th of vodka a day)  Recalculated FRAX is 4.5% risk of hip fracture.    I plan to call patient and discuss with him that with this diagnosis of osteoporosis, he now meets surgical criteria for his primary hyperparathyroidism and if his agreeable I will order thyroid ultrasound to assess for parathyroid adenoma and parathyroid uptake and scan. After imaging returns, will make referral to Endocrine surgery    Please let me know if you agree"

## 2023-05-12 ENCOUNTER — PATIENT MESSAGE (OUTPATIENT)
Dept: ENDOCRINOLOGY | Facility: CLINIC | Age: 78
End: 2023-05-12
Payer: MEDICARE

## 2023-05-17 ENCOUNTER — PES CALL (OUTPATIENT)
Dept: ADMINISTRATIVE | Facility: CLINIC | Age: 78
End: 2023-05-17
Payer: MEDICARE

## 2023-07-19 ENCOUNTER — OFFICE VISIT (OUTPATIENT)
Dept: SPORTS MEDICINE | Facility: CLINIC | Age: 78
End: 2023-07-19
Payer: MEDICARE

## 2023-07-19 VITALS
HEIGHT: 66 IN | HEART RATE: 96 BPM | DIASTOLIC BLOOD PRESSURE: 81 MMHG | WEIGHT: 163 LBS | SYSTOLIC BLOOD PRESSURE: 130 MMHG | BODY MASS INDEX: 26.2 KG/M2

## 2023-07-19 DIAGNOSIS — G89.29 CHRONIC PAIN IN LEFT SHOULDER: ICD-10-CM

## 2023-07-19 DIAGNOSIS — M75.102 NON-TRAUMATIC ROTATOR CUFF TEAR, LEFT: Primary | ICD-10-CM

## 2023-07-19 DIAGNOSIS — M25.512 CHRONIC PAIN IN LEFT SHOULDER: ICD-10-CM

## 2023-07-19 PROCEDURE — 3079F PR MOST RECENT DIASTOLIC BLOOD PRESSURE 80-89 MM HG: ICD-10-PCS | Mod: HCNC,CPTII,S$GLB, | Performed by: ORTHOPAEDIC SURGERY

## 2023-07-19 PROCEDURE — 1125F AMNT PAIN NOTED PAIN PRSNT: CPT | Mod: HCNC,CPTII,S$GLB, | Performed by: ORTHOPAEDIC SURGERY

## 2023-07-19 PROCEDURE — 1159F PR MEDICATION LIST DOCUMENTED IN MEDICAL RECORD: ICD-10-PCS | Mod: HCNC,CPTII,S$GLB, | Performed by: ORTHOPAEDIC SURGERY

## 2023-07-19 PROCEDURE — 99999 PR PBB SHADOW E&M-EST. PATIENT-LVL III: CPT | Mod: PBBFAC,,, | Performed by: ORTHOPAEDIC SURGERY

## 2023-07-19 PROCEDURE — 3075F PR MOST RECENT SYSTOLIC BLOOD PRESS GE 130-139MM HG: ICD-10-PCS | Mod: HCNC,CPTII,S$GLB, | Performed by: ORTHOPAEDIC SURGERY

## 2023-07-19 PROCEDURE — 1101F PR PT FALLS ASSESS DOC 0-1 FALLS W/OUT INJ PAST YR: ICD-10-PCS | Mod: HCNC,CPTII,S$GLB, | Performed by: ORTHOPAEDIC SURGERY

## 2023-07-19 PROCEDURE — 3288F PR FALLS RISK ASSESSMENT DOCUMENTED: ICD-10-PCS | Mod: HCNC,CPTII,S$GLB, | Performed by: ORTHOPAEDIC SURGERY

## 2023-07-19 PROCEDURE — 3079F DIAST BP 80-89 MM HG: CPT | Mod: HCNC,CPTII,S$GLB, | Performed by: ORTHOPAEDIC SURGERY

## 2023-07-19 PROCEDURE — 3288F FALL RISK ASSESSMENT DOCD: CPT | Mod: HCNC,CPTII,S$GLB, | Performed by: ORTHOPAEDIC SURGERY

## 2023-07-19 PROCEDURE — 1101F PT FALLS ASSESS-DOCD LE1/YR: CPT | Mod: HCNC,CPTII,S$GLB, | Performed by: ORTHOPAEDIC SURGERY

## 2023-07-19 PROCEDURE — 99999 PR PBB SHADOW E&M-EST. PATIENT-LVL III: ICD-10-PCS | Mod: PBBFAC,,, | Performed by: ORTHOPAEDIC SURGERY

## 2023-07-19 PROCEDURE — 1125F PR PAIN SEVERITY QUANTIFIED, PAIN PRESENT: ICD-10-PCS | Mod: HCNC,CPTII,S$GLB, | Performed by: ORTHOPAEDIC SURGERY

## 2023-07-19 PROCEDURE — 99214 PR OFFICE/OUTPT VISIT, EST, LEVL IV, 30-39 MIN: ICD-10-PCS | Mod: HCNC,S$GLB,, | Performed by: ORTHOPAEDIC SURGERY

## 2023-07-19 PROCEDURE — 3075F SYST BP GE 130 - 139MM HG: CPT | Mod: HCNC,CPTII,S$GLB, | Performed by: ORTHOPAEDIC SURGERY

## 2023-07-19 PROCEDURE — 99214 OFFICE O/P EST MOD 30 MIN: CPT | Mod: HCNC,S$GLB,, | Performed by: ORTHOPAEDIC SURGERY

## 2023-07-19 PROCEDURE — 1159F MED LIST DOCD IN RCRD: CPT | Mod: HCNC,CPTII,S$GLB, | Performed by: ORTHOPAEDIC SURGERY

## 2023-07-19 NOTE — PROGRESS NOTES
CC: left shoulder pain, patient is retired, referred by Dr. Gomez     77 y.o. Male RHD reports that the pain is severe and not responding to any conservative care.      Here today for follow up of MRI results     Pain has been present since January of 2023  Denies any injury or trauma or change in activity     He reports that the pain is worse with overhead activity. It also bothers him at night.  He notes pain with most movements and at times has to assist himself with his non involved arm when lifting    He describes his pain as anterior and posterior     Is affecting ADLs.     SANE: 50  Pain today 7/10    He notes a previous right shoulder rotator cuff repair back in 2015     Review of Systems   Constitution: Negative. Negative for chills, fever and night sweats.   HENT: Negative for congestion and headaches.    Eyes: Negative for blurred vision, left vision loss and right vision loss.   Cardiovascular: Negative for chest pain and syncope.   Respiratory: Negative for cough and shortness of breath.    Endocrine: Negative for polydipsia, polyphagia and polyuria.   Hematologic/Lymphatic: Negative for bleeding problem. Does not bruise/bleed easily.   Skin: Negative for dry skin, itching and rash.   Musculoskeletal: Negative for falls and muscle weakness.   Gastrointestinal: Negative for abdominal pain and bowel incontinence.   Genitourinary: Negative for bladder incontinence and nocturia.   Neurological: Negative for disturbances in coordination, loss of balance and seizures.   Psychiatric/Behavioral: Negative for depression. The patient does not have insomnia.    Allergic/Immunologic: Negative for hives and persistent infections.     PAST MEDICAL HISTORY:   Past Medical History:   Diagnosis Date    Cancer     Hyperlipidemia     Hypertension      PAST SURGICAL HISTORY:   Past Surgical History:   Procedure Laterality Date    SHOULDER SURGERY       FAMILY HISTORY:   Family History   Problem Relation Age of Onset     "Heart attack Mother      SOCIAL HISTORY:   Social History     Socioeconomic History    Marital status:    Tobacco Use    Smoking status: Never    Smokeless tobacco: Never   Substance and Sexual Activity    Alcohol use: Yes     Comment: social       MEDICATIONS:   Current Outpatient Medications:     amLODIPine (NORVASC) 10 MG tablet, TAKE 1 TABLET EVERY DAY, Disp: 90 tablet, Rfl: 3    aspirin-calcium carbonate 81 mg-300 mg calcium(777 mg) Tab, aspirin 81 mg tablet  Take by oral route., Disp: , Rfl:     finasteride (PROSCAR) 5 mg tablet, TAKE 1 TABLET EVERY DAY, Disp: 90 tablet, Rfl: 3    irbesartan (AVAPRO) 300 MG tablet, TAKE 1 TABLET EVERY DAY, Disp: 90 tablet, Rfl: 1    multivitamin capsule, Take 1 capsule by mouth once daily., Disp: , Rfl:     omeprazole (PRILOSEC) 20 MG capsule, TAKE 1 CAPSULE EVERY DAY, Disp: 90 capsule, Rfl: 3    rosuvastatin (CRESTOR) 10 MG tablet, TAKE 1 TABLET EVERY DAY, Disp: 90 tablet, Rfl: 1    tamsulosin (FLOMAX) 0.4 mg Cap, TAKE 1 CAPSULE EVERY DAY, Disp: 90 capsule, Rfl: 3    vit A/vit C/vit E/zinc/copper (ICAPS AREDS ORAL), Take by mouth., Disp: , Rfl:     vitamin D (VITAMIN D3) 1000 units Tab, Take 1,000 Units by mouth once daily. 2,000 iu daily, Disp: , Rfl:     sildenafiL (VIAGRA) 100 MG tablet, Take 1 tablet (100 mg total) by mouth daily as needed for Erectile Dysfunction., Disp: 10 tablet, Rfl: 1  ALLERGIES:   Review of patient's allergies indicates:   Allergen Reactions    Penicillins     Sulfa (sulfonamide antibiotics)     Sulfur Hives       VITAL SIGNS: /81   Pulse 96   Ht 5' 6" (1.676 m)   Wt 73.9 kg (163 lb 0.5 oz)   BMI 26.31 kg/m²      PHYSICAL EXAMINATION:  General:  The patient is alert and oriented x 3.  Mood is pleasant.  Observation of ears, eyes and nose reveal no gross abnormalities.  No labored breathing observed.  Gait is coordinated. Patient can toe walk and heel walk without difficulty.      left SHOULDER / UPPER EXTREMITY EXAM    OBSERVATION:  "    Swelling  none  Deformity  none   Discoloration  none   Scapular winging none   Scars   none  Atrophy  none    TENDERNESS / CREPITUS (T/C):          T/C      T/C   Clavicle   -/-  SUPRAspinatus    -/-     AC Jt.    -/-  INFRAspinatus  -/-    SC Jt.    -/-  Deltoid    -/-      G. Tuberosity  -/-  LH BICEP groove  +/-   Acromion:  -/-  Midline Neck   -/-     Scapular Spine -/-  Trapezium   -/-   SMA Scapula  -/-  GH jt. line - post  -/-     Scapulothoracic  -/-         ROM: (* = with pain)  Right shoulder   Left shoulder        AROM (PROM)   AROM (PROM)   FE    170° (175°)     170° (175°)     ER at 0°    40°  (45°)    30°  (35°)   ER at 90° ABD  90°  (90°)    90°  (90°)   IR at 90°  ABD   NA  (40°)     NA  (40°)      IR (spine level)   T10     T12    STRENGTH: (* = with pain) RIGHT SHOULDER  LEFT SHOULDER   SCAPTION at 0  5/5    4/5   SCAPTION at 30  5/5    4+/5    IR    5/5    5/5   ER    5/5    5-/5   BICEPS   5/5    5/5   Deltoid    5/5    5/5     SIGNS:  Painful side       NEER   +    OLEONORS  +    LOPES   +    SPEEDS  +     DROP ARM   neg   BELLY PRESS neg   Superior escape none    LIFT-OFF  neg   X-Body ADD    neg    MOVING VALGUS neg        STABILITY TESTING    RIGHT SHOULDER   LEFT SHOULDER     Translation     Anterior  up face     up face    Posterior  up face    up face    Sulcus   < 10mm    < 10 mm     Signs   Apprehension   neg      neg       Relocation   no change     no change      Jerk test  neg     neg    EXTREMITY NEURO-VASCULAR EXAM    Sensation grossly intact to light touch all dermatomal regions.    DTR 2+ Biceps, Triceps, BR and Negative Alyssas sign   Grossly intact motor function at Elbow, Wrist and Hand   Distal pulses radial and ulnar 2+, brisk cap refill, symmetric.      NECK:  Painless FROM and spinous processes non-tender. Negative Spurlings sign.      OTHER FINDINGS:  + scapular dyskinesia  Bear Hug: + for pain and weakness     XRAYS reviewed and interpreted personally by  me:  Shoulder trauma series left,  were obtained and reviewed  Mild moderate DJD glenohumeral joint particularly inferiorly.  Hypertrophic change cortex anterior posterior humeral head and greater tuberosity region.  No fracture dislocation.  Small inferior spur formation at level of AC joint encroaching on sub acromial space.      MRI: Left shoulder reviewed and interpreted by myself:   Large rotator cuff tear including upper subscapularis, SLAP tear, and biceps tendonitis. After discussion with the patient he understands and would like to consider his options at this time.        ASSESSMENT:    Left Shoulder Rotator Cuff Tear, SLAP, biceps tendonitis.       he would benefit from an arthroscopy, given the above.         PLAN:   Left Shoulder rotator cuff tear      Patient would like to proceed with conservative management at this time including physical therapy   If patient decides to proceed with surgery he will likely need a new MRI due to motion artifact     All questions were answered, pt will contact us for questions or concerns in the interim.  Had thorough discussion of non-operative vs operative intervention, and risks and benefits of both.      We have discussed the surgery and recovery of arthroscopic shoulder surgery. he understands that there may be limited mobility up to several weeks after surgery depending on procedures that are performed at the time of surgery.     The spectrum of treatment options were discussed with the patient, including nonoperative and operative options.  After thorough discussion, the patient would like to consider his options. His surgical plan would include:     left              a. Shoulder arthroscopic rotator cuff repair with Cuffmend vs. Tuberoplasty with cable reconstruction. Repair upper subscapularis               b. Shoulder arthroscopic SAD              c. Shoulder arthroscopic extensive debridement              d. Shoulder arthroscopic biceps tenodesis (vs. subpect  tenodesis)              e. Shoulder arthroscopic possible microfracture              f.  Shoulder open reduction internal fixation greater tuberosity     The details of the surgical procedure were explained, including the location of probable incisions and a description of likely hardware and/or grafts to be used.  The patient understands the likely convalescence after surgery.  Also, we have thoroughly discussed the risks, benefits and alternatives to surgery, including, but not limited to, the risk of infection, joint stiffness, blood clot (including DVT and/or pulmonary embolus), neurologic and vascular injury.  It was explained that, if tissue has been repaired or reconstructed, there is a chance of failure, which may require further management.      I made the decision to obtain old records of the patient including previous notes and imaging. I independently reviewed and interpreted the radiographs and/or MRIs today as well as prior imaging.

## 2023-07-26 ENCOUNTER — OFFICE VISIT (OUTPATIENT)
Dept: PRIMARY CARE CLINIC | Facility: CLINIC | Age: 78
End: 2023-07-26
Payer: MEDICARE

## 2023-07-26 VITALS
SYSTOLIC BLOOD PRESSURE: 130 MMHG | HEART RATE: 100 BPM | WEIGHT: 162.25 LBS | DIASTOLIC BLOOD PRESSURE: 80 MMHG | HEIGHT: 66 IN | OXYGEN SATURATION: 95 % | BODY MASS INDEX: 26.08 KG/M2

## 2023-07-26 DIAGNOSIS — E21.0 PRIMARY HYPERPARATHYROIDISM: ICD-10-CM

## 2023-07-26 DIAGNOSIS — Z85.46 HISTORY OF PROSTATE CANCER: ICD-10-CM

## 2023-07-26 DIAGNOSIS — E78.2 MIXED HYPERLIPIDEMIA: ICD-10-CM

## 2023-07-26 DIAGNOSIS — I10 BENIGN ESSENTIAL HYPERTENSION: Primary | ICD-10-CM

## 2023-07-26 PROCEDURE — 3079F PR MOST RECENT DIASTOLIC BLOOD PRESSURE 80-89 MM HG: ICD-10-PCS | Mod: CPTII,S$GLB,, | Performed by: INTERNAL MEDICINE

## 2023-07-26 PROCEDURE — 99999 PR PBB SHADOW E&M-EST. PATIENT-LVL III: CPT | Mod: PBBFAC,,, | Performed by: INTERNAL MEDICINE

## 2023-07-26 PROCEDURE — 99999 PR PBB SHADOW E&M-EST. PATIENT-LVL III: ICD-10-PCS | Mod: PBBFAC,,, | Performed by: INTERNAL MEDICINE

## 2023-07-26 PROCEDURE — 3075F SYST BP GE 130 - 139MM HG: CPT | Mod: CPTII,S$GLB,, | Performed by: INTERNAL MEDICINE

## 2023-07-26 PROCEDURE — 1159F MED LIST DOCD IN RCRD: CPT | Mod: CPTII,S$GLB,, | Performed by: INTERNAL MEDICINE

## 2023-07-26 PROCEDURE — 99214 OFFICE O/P EST MOD 30 MIN: CPT | Mod: S$GLB,,, | Performed by: INTERNAL MEDICINE

## 2023-07-26 PROCEDURE — 1159F PR MEDICATION LIST DOCUMENTED IN MEDICAL RECORD: ICD-10-PCS | Mod: CPTII,S$GLB,, | Performed by: INTERNAL MEDICINE

## 2023-07-26 PROCEDURE — 99214 PR OFFICE/OUTPT VISIT, EST, LEVL IV, 30-39 MIN: ICD-10-PCS | Mod: S$GLB,,, | Performed by: INTERNAL MEDICINE

## 2023-07-26 PROCEDURE — 3075F PR MOST RECENT SYSTOLIC BLOOD PRESS GE 130-139MM HG: ICD-10-PCS | Mod: CPTII,S$GLB,, | Performed by: INTERNAL MEDICINE

## 2023-07-26 PROCEDURE — 3079F DIAST BP 80-89 MM HG: CPT | Mod: CPTII,S$GLB,, | Performed by: INTERNAL MEDICINE

## 2023-07-26 PROCEDURE — 1160F PR REVIEW ALL MEDS BY PRESCRIBER/CLIN PHARMACIST DOCUMENTED: ICD-10-PCS | Mod: CPTII,S$GLB,, | Performed by: INTERNAL MEDICINE

## 2023-07-26 PROCEDURE — 99499 UNLISTED E&M SERVICE: CPT | Mod: S$GLB,,, | Performed by: INTERNAL MEDICINE

## 2023-07-26 PROCEDURE — 1160F RVW MEDS BY RX/DR IN RCRD: CPT | Mod: CPTII,S$GLB,, | Performed by: INTERNAL MEDICINE

## 2023-07-26 NOTE — PROGRESS NOTES
[facility] Note    Chief Complaint      Chief Complaint   Patient presents with    Follow-up     6 month ck       History of Present Illness      Turner Fernandes is a 77 y.o. male who presents today for   Chief Complaint   Patient presents with    Follow-up     6 month ck   .  Patient comes to appointment here for 6m checkup for chronic issues as below . He is dealing with left shoulder pain / rotator cuff tear . He needs full screening labs with this visit. He complains of some mild low back pain .    HPI    No problem-specific Assessment & Plan notes found for this encounter.       Problem List Items Addressed This Visit          Cardiac/Vascular    Benign essential hypertension - Primary    Overview     bp well controlled on current regimen is off hctz          Mixed hyperlipidemia    Overview     crestor working well cont same needs full labs              Oncology    History of prostate cancer    Overview     Cont current surveillance with dr malik             Endocrine    Primary hyperparathyroidism    Overview     Endo managing repeat              Past Medical History:  Past Medical History:   Diagnosis Date    Cancer     Hyperlipidemia     Hypertension        Past Surgical History:  Past Surgical History:   Procedure Laterality Date    SHOULDER SURGERY         Family History:  family history includes Heart attack in his mother.     Social History:  Social History     Socioeconomic History    Marital status:    Tobacco Use    Smoking status: Never    Smokeless tobacco: Never   Substance and Sexual Activity    Alcohol use: Yes     Comment: social    Drug use: Never    Sexual activity: Not Currently       Review of Systems:   Review of Systems   Constitutional:  Negative for fever and weight loss.   HENT:  Negative for congestion, hearing loss and sore throat.    Eyes:  Negative for blurred vision.   Respiratory:  Negative for cough and shortness of breath.    Cardiovascular:   Negative for chest pain, palpitations, claudication and leg swelling.   Gastrointestinal:  Negative for abdominal pain, constipation, diarrhea, heartburn, nausea and vomiting.   Genitourinary:  Negative for dysuria.   Musculoskeletal:  Positive for back pain and joint pain. Negative for myalgias.   Skin:  Negative for rash.   Neurological:  Negative for focal weakness and headaches.   Psychiatric/Behavioral:  Negative for depression, memory loss and suicidal ideas. The patient is not nervous/anxious.       Medications:  Outpatient Encounter Medications as of 7/26/2023   Medication Sig Dispense Refill    amLODIPine (NORVASC) 10 MG tablet TAKE 1 TABLET EVERY DAY 90 tablet 3    aspirin-calcium carbonate 81 mg-300 mg calcium(777 mg) Tab aspirin 81 mg tablet   Take by oral route.      finasteride (PROSCAR) 5 mg tablet TAKE 1 TABLET EVERY DAY 90 tablet 3    irbesartan (AVAPRO) 300 MG tablet TAKE 1 TABLET EVERY DAY 90 tablet 1    multivitamin capsule Take 1 capsule by mouth once daily.      omeprazole (PRILOSEC) 20 MG capsule TAKE 1 CAPSULE EVERY DAY 90 capsule 3    rosuvastatin (CRESTOR) 10 MG tablet TAKE 1 TABLET EVERY DAY 90 tablet 1    tamsulosin (FLOMAX) 0.4 mg Cap TAKE 1 CAPSULE EVERY DAY 90 capsule 3    vit A/vit C/vit E/zinc/copper (ICAPS AREDS ORAL) Take by mouth.      vitamin D (VITAMIN D3) 1000 units Tab Take 1,000 Units by mouth once daily. 2,000 iu daily      sildenafiL (VIAGRA) 100 MG tablet Take 1 tablet (100 mg total) by mouth daily as needed for Erectile Dysfunction. 10 tablet 1     No facility-administered encounter medications on file as of 7/26/2023.       Allergies:  Review of patient's allergies indicates:   Allergen Reactions    Penicillins     Sulfa (sulfonamide antibiotics)     Sulfur Hives         Physical Exam      Vitals:    07/26/23 0943   BP: 130/80   Pulse: 100        Vital Signs  Pulse: 100  SpO2: 95 %  BP: 130/80  BP Location: Right arm  Patient Position: Sitting  Height and Weight  Height:  "5' 6" (167.6 cm)  Weight: 73.6 kg (162 lb 4.1 oz)  BSA (Calculated - sq m): 1.85 sq meters  BMI (Calculated): 26.2  Weight in (lb) to have BMI = 25: 154.6]     Body mass index is 26.19 kg/m².    Physical Exam  Constitutional:       Appearance: He is well-developed.   HENT:      Head: Normocephalic.   Eyes:      Pupils: Pupils are equal, round, and reactive to light.   Neck:      Thyroid: No thyromegaly.   Cardiovascular:      Rate and Rhythm: Normal rate and regular rhythm.      Heart sounds: No murmur heard.    No friction rub. No gallop.   Pulmonary:      Effort: Pulmonary effort is normal.      Breath sounds: Normal breath sounds.   Abdominal:      General: Bowel sounds are normal.      Palpations: Abdomen is soft.   Musculoskeletal:      Left shoulder: Tenderness present. Decreased range of motion.      Cervical back: Normal range of motion.   Skin:     General: Skin is warm and dry.   Neurological:      Mental Status: He is alert and oriented to person, place, and time.      Sensory: No sensory deficit.   Psychiatric:         Behavior: Behavior normal.        Laboratory:  CBC:  No results for input(s): WBC, RBC, HGB, HCT, PLT, MCV, MCH, MCHC in the last 2160 hours.  CMP:  No results for input(s): GLU, CALCIUM, ALBUMIN, PROT, NA, K, CO2, CL, BUN, ALKPHOS, ALT, AST, BILITOT in the last 2160 hours.    Invalid input(s): CREATININ  URINALYSIS:  No results for input(s): COLORU, CLARITYU, SPECGRAV, PHUR, PROTEINUA, GLUCOSEU, BILIRUBINCON, BLOODU, WBCU, RBCU, BACTERIA, MUCUS, NITRITE, LEUKOCYTESUR, UROBILINOGEN, HYALINECASTS in the last 2160 hours.   LIPIDS:  No results for input(s): TSH, HDL, CHOL, TRIG, LDLCALC, CHOLHDL, NONHDLCHOL, TOTALCHOLEST in the last 2160 hours.  TSH:  No results for input(s): TSH in the last 2160 hours.  A1C:  No results for input(s): HGBA1C in the last 2160 hours.    Radiology:        Assessment:     Turner Fernandes is a 77 y.o.male with:    Benign essential hypertension  -     CBC Auto " Differential; Future; Expected date: 07/26/2023    Mixed hyperlipidemia  -     Comprehensive Metabolic Panel; Future; Expected date: 07/26/2023  -     Lipid Panel; Future; Expected date: 07/26/2023    Primary hyperparathyroidism    History of prostate cancer                Plan:     Problem List Items Addressed This Visit          Cardiac/Vascular    Benign essential hypertension - Primary    Overview     bp well controlled on current regimen is off hctz          Mixed hyperlipidemia    Overview     crestor working well cont same needs full labs              Oncology    History of prostate cancer    Overview     Cont current surveillance with dr malik             Endocrine    Primary hyperparathyroidism    Overview     Endo managing repeat             As above, continue current medications and maintain follow up with specialists.  Return to clinic in 6 months.      Frederick W Dantagnan [facility]

## 2023-07-27 ENCOUNTER — CLINICAL SUPPORT (OUTPATIENT)
Dept: REHABILITATION | Facility: HOSPITAL | Age: 78
End: 2023-07-27
Attending: ORTHOPAEDIC SURGERY
Payer: MEDICARE

## 2023-07-27 DIAGNOSIS — G25.89 SCAPULAR DYSKINESIS: ICD-10-CM

## 2023-07-27 DIAGNOSIS — M25.512 CHRONIC PAIN IN LEFT SHOULDER: ICD-10-CM

## 2023-07-27 DIAGNOSIS — M75.102 NON-TRAUMATIC ROTATOR CUFF TEAR, LEFT: ICD-10-CM

## 2023-07-27 DIAGNOSIS — G89.29 CHRONIC PAIN IN LEFT SHOULDER: ICD-10-CM

## 2023-07-27 DIAGNOSIS — M25.612 DECREASED ROM OF LEFT SHOULDER: Primary | ICD-10-CM

## 2023-07-27 DIAGNOSIS — R29.898 SHOULDER WEAKNESS: ICD-10-CM

## 2023-07-27 PROCEDURE — 97161 PT EVAL LOW COMPLEX 20 MIN: CPT | Mod: HCNC

## 2023-07-27 PROCEDURE — 97110 THERAPEUTIC EXERCISES: CPT | Mod: HCNC

## 2023-07-27 NOTE — PLAN OF CARE
[facility]  Physical Therapy Initial Evaluation    Name: Turner Fernandes  Clinic Number: [id number]    Therapy Diagnosis:   Encounter Diagnoses   Name Primary?    Chronic pain in left shoulder     Non-traumatic rotator cuff tear, left     Decreased ROM of left shoulder Yes    Shoulder weakness     Scapular dyskinesis      Physician: Roberta Lutz MD    Physician Orders: PT Eval and Treat  Medical Diagnosis from Referral:   M25.512,G89.29 (ICD-10-CM) - Chronic pain in left shoulder   M75.102 (ICD-10-CM) - Non-traumatic rotator cuff tear, left     Evaluation Date: 7/27/2023  Authorization Period Expiration: 07/18/2024  Plan of Care Expiration: 12/1/2023  Visit # / Visits authorized: 1/ 1    Time In: 1030  Time Out: 1130  Total Billable Time: 60 minutes    Precautions: Standard    Subjective     Date of onset: ~ 6 months   History of current condition - Turner reports: chronic left shoulder pain that comes and goes, brought on by overhead movement, and is described as achy, anterior and posterior, 0/10 at rest, but 5/10 with provocative motion. Patient states pain causes him to wake up from sleep. Patient is retired, largely sedentary, lives alone. He states insidious onset. Patient has history of right RCR in 2015 which he states was very painful and hard to rehab. He does not want to undergo surgery again. Patient had MRI which showed tears of the supraspinatus, infraspinatus, subscapularis, and long head biceps tears.       Imaging   MRI Shoulder Without Contrast Left (2/15/2023): Full-thickness full width tear of the supraspinatus with retraction to the superior humeral head.  Partial undersurface tear of the infraspinatus and undersurface tear of the subscapularis partial-thickness.     Subacromial subdeltoid bursitis.     Nonvisualization of the biceps anchor/intra-articular portion long head biceps likely chronically torn.     Superior migration of the humeral head with narrowing of the  acromial humeral interval and associated cartilage loss predominantly at the level of the humeral head.      Prior Therapy: for the right shoulder after right RCR in 2015   Exercise Routine/Sport Participation: was walking 5-6 miles per day, 4-5 times per week   Social History: lives alone   Occupation: retired  Prior Level of Function: IADLs  Current Level of Function: limited functional mobility 2/2 to pain    Pain:  Current 0/10, worst 5/10, best 0/10   Location: left shoulder  Description: Aching  Aggravating Factors: overhead movement  Easing Factors: rest    Pts goals: reduce pain, improve functional mobility       Medical History:   Past Medical History:   Diagnosis Date    Cancer     Hyperlipidemia     Hypertension        Surgical History:   Turner Fernandes  has a past surgical history that includes Shoulder surgery.    Medications:   Turner has a current medication list which includes the following prescription(s): amlodipine, aspirin-calcium carbonate, finasteride, irbesartan, multivitamin, omeprazole, rosuvastatin, sildenafil, tamsulosin, vit a/vit c/vit e/zinc/copper, and vitamin d.    Allergies:   Review of patient's allergies indicates:   Allergen Reactions    Penicillins     Sulfa (sulfonamide antibiotics)     Sulfur Hives        Objective     Observation: no acute distress, appropriate mood and affect    Posture: protracted head, anterior shoulders     Passive Range of Motion:   Shoulder Right Left   Flexion 170 170   Abduction 170 170   ER at 0 90 90   ER at 90 NT NT   IR 80 80      Active Range of Motion:   Shoulder Right Left   Flexion 170 170*   Abduction 170 170*   ER at 0 80 80*   ER at 90     IR     Reach behind head T6 T3   Reach behind back  T5 T8     Strength:  Shoulder Right Left   Flexion 4/5 3+/5*   Abduction 4/5 3+/5*   ER 4/5 3+/5*   IR 4+/5 4-/5   Serratus Anterior NT NT   Middle Trap NT NT   Low Trap NT NT       Special Tests:  Impingement Cluster:   Right Left   Hawkin's Kenndy - +    Painful Arc - +   Resisted ER - +     Rotator Cuff Tear   Right Left   Hawkin's Benjamin - +   Drop Arm test - -   Resisted ER - +     Instability:   Right Left   Anterior Apprehension Test - -   Relocation test - -   Posterior Apprehension Test - -   Sulcus Sign - -     SLAP:   Right Left   Biceps Load II - +   O'Maico's Test - +     Other:   Right Left   Subscapularis Lift Off Test NT NT   Empty Can - +     Joint Mobility: symmetrical mobility     Palpation: no Tenderness to palpation noted to left shoulder    Sensation: intact    Flexibility: NT      CMS Impairment/Limitation/Restriction for FOTO Shoulder Survey    Therapist reviewed FOTO scores for Turner Fernandes on 7/27/2023.   FOTO documents entered into [employer] - see Media section.    Limitation Score: 52%  Category: Mobility       Treatment     Treatment Time In: 1100  Treatment Time Out: 1125  Total Treatment time separate from Evaluation: 25 minutes     Turner received the treatments listed below:      Therapeutic exercises to develop strength, endurance, ROM, flexibility, posture, and core stabilization for 25 minutes including:  Scapular retraction  OTB ER isometric   OTB IR isometric   Flexion isometric against wall  Extension isometric against wall   GTB scapular retraction      Manual therapy techniques: Joint mobilizations and Soft tissue Mobilization were applied to the: left shoulder for 00 minutes, including:        Home Exercises and Patient Education Provided     Education provided:   - Prognosis, activity modification, goals for therapy, role of therapy for care, exercises/HEP    Written Home Exercises Provided: emailed.  Exercises were reviewed and Turner was able to demonstrate them prior to the end of the session.   Pt received a written copy of exercises to perform at home. Turner demonstrated good  understanding of the education provided.     See EMR under patient instructions for exercises given.     Assessment     Turner is a 77 y.o. male  referred to outpatient Physical Therapy with left chronic shoulder pain. Patient demonstrates limitations in overhead active range of motion, shoulder weakness, and scapular dyskinesia, thereby limiting him functionally, with ADLs. Turnre had generally low levels of  pain and irritability. Testing and subjective supports rotator cuff pathology.      Pt will benefit from skilled outpatient Physical Therapy to address the deficits stated above and in the chart below, provide pt/family education, and to maximize pt's level of independence. Pt prognosis is Fair.     Plan of care discussed with patient: Yes  Pt's spiritual, cultural and educational needs considered and patient is agreeable to the plan of care and goals as stated below:       Anticipated Barriers for therapy: none      Medical Necessity is demonstrated by the following  History  Co-morbidities and personal factors that may impact the plan of care Co-morbidities:   Hyperparathyroidism, osteoporosis, history of prostate CA, HTN    Personal Factors:   lifestyle     moderate   Examination  Body Structures and Functions, activity limitations and participation restrictions that may impact the plan of care Body Regions:   upper extremities    Body Systems:    gross symmetry  ROM  strength  gross coordinated movement  balance  gait  transfers  transitions  motor control  motor learning  scar formation    Participation Restrictions:   To tolerance    Activity limitations:   Learning and applying knowledge  No deficit    General Tasks and Commands  No deficit    Communication  No deficit    Mobility  lifting and carrying objects    Self care  No deficit    Domestic Life  No deficit    Interactions/Relationships  No deficit    Life Areas  Recreational Activities to A with health and wellness     Community and Social Life  No deficit          low   Clinical Presentation stable and uncomplicated low   Decision Making/ Complexity Score: low     GOALS:   Short Term  Goals:  4 weeks  1.Report decreased left shoulder pain < / =  3/10 at max to increase tolerance for HEP/ADLs  2. Increase PROM to full and symmetrical.    3. Increased strength by 1/3 MMT grade in rotator cuff and periscapular musculature to increase tolerance for ADL and work activities.  4. Pt to tolerate HEP to improve ROM and independence with ADL's    Long Term Goals: 8-12 weeks  1.Report decreased left shoulder pain  < / =  1 /10 at max to increase tolerance for HEP/ADLs/hobbies  2.Increase pain free AROM to full and symmetrical.  3.Increase strength to >/= 4/5 in rotator cuff and periscapular musculature to increase tolerance for ADL and work activities.  4. Pt goal: reduce pain, improve functional mobility   5. Pt will have improved gcode of CJ (20-40% limited) on FOTO shoulder in order to demonstrate true functional improvement.      Plan   Plan of care Certification: 7/27/2023 to 12/1/2023.    Outpatient Physical Therapy 1-2 times weekly for 12 weeks to include the following interventions: Aquatic Therapy, Cervical/Lumbar Traction, Electrical Stimulation NMES, Manual Therapy, Moist Heat/ Ice, Neuromuscular Re-ed, Patient Education, Self Care, Therapeutic Activities, and Therapeutic Exercise.     Gloira Montgomery, PT , DPT

## 2023-07-28 LAB
ALBUMIN: 5 G/DL (ref 3.5–5)
ALP SERPL-CCNC: 79 U/L (ref 38–126)
ALT SERPL W P-5'-P-CCNC: 15 U/L (ref 7–56)
ANION GAP SERPL CALC-SCNC: 17.2 MMOL/L (ref 9–18)
AST SERPL-CCNC: 28 U/L (ref 7–40)
BASOPHILS ABSOLUTE COUNT: 0.1 THOUSAND/UL (ref 0–0.2)
BASOPHILS NFR BLD: 1.2 % (ref 0–2)
BILIRUB SERPL-MCNC: 1.5 MG/DL (ref 0–1.2)
BUN BLD-MCNC: 14 MG/DL (ref 7–21)
BUN/CREAT SERPL: 18 (ref 6–22)
CALC OSMOLALITY: 286 MOSM/KG (ref 275–295)
CALCIUM SERPL-MCNC: 10.6 MG/DL (ref 8.5–10.3)
CHLORIDE SERPL-SCNC: 102 MMOL/L (ref 98–107)
CHOL/HDLC RATIO: 1.47
CHOLEST SERPL-MSCNC: 255 MG/DL (ref 100–200)
CO2 SERPL-SCNC: 28 MMOL/L (ref 21–31)
CREAT SERPL-MCNC: 0.77 MG/DL (ref 0.7–1.2)
EGFR: 91 ML/MIN/1.73M2
EOSINOPHIL NFR BLD: 1.1 % (ref 0–4)
EOSINOPHILS ABSOLUTE COUNT: 0.1 THOUSAND/UL (ref 0–0.7)
ERYTHROCYTE [DISTWIDTH] IN BLOOD BY AUTOMATED COUNT: 14.3 % (ref 12–15.3)
GLUCOSE SERPL-MCNC: 107 MG/DL (ref 70–100)
HCT VFR BLD AUTO: 40.7 % (ref 40–52)
HDLC SERPL-MCNC: 173 MG/DL (ref 40–75)
HGB BLD-MCNC: 14.2 GM/DL (ref 13.6–17.5)
LDLC SERPL CALC-MCNC: 71 MG/DL (ref 0–130)
LYMPHOCYTES %: 24.6 % (ref 15–45)
LYMPHOCYTES ABSOLUTE COUNT: 1.6 THOUSAND/UL (ref 1–4.2)
MCH RBC QN AUTO: 34.4 PG (ref 27–33)
MCHC RBC AUTO-ENTMCNC: 35 G/DL (ref 32–36)
MCV RBC AUTO: 98.2 FL (ref 80–94)
MONOCYTES %: 12.4 % (ref 3–13)
MONOCYTES ABSOLUTE COUNT: 0.8 THOUSAND/UL (ref 0.1–0.8)
NEUTROPHILS ABSOLUTE COUNT: 3.9 THOUSAND/UL (ref 2.1–7.6)
NEUTROPHILS RELATIVE PERCENT: 60.7 % (ref 32–80)
PLATELET # BLD AUTO: 197 THOUSAND/UL (ref 150–350)
PMV BLD AUTO: 8.6 FL (ref 7–10.2)
POTASSIUM SERPL-SCNC: 4.2 MMOL/L (ref 3.5–5)
RBC # BLD AUTO: 4.15 MILLION/UL (ref 4.45–5.9)
SODIUM BLD-SCNC: 143 MMOL/L (ref 135–145)
TOTAL PROTEIN: 7.3 G/DL (ref 6.3–8.2)
TRIGL SERPL-MCNC: 70 MG/DL (ref 30–150)
WBC # BLD AUTO: 6.5 THOUSAND/UL (ref 4.5–11)

## 2023-08-01 ENCOUNTER — CLINICAL SUPPORT (OUTPATIENT)
Dept: REHABILITATION | Facility: HOSPITAL | Age: 78
End: 2023-08-01
Payer: MEDICARE

## 2023-08-01 DIAGNOSIS — G89.29 CHRONIC PAIN IN LEFT SHOULDER: Primary | ICD-10-CM

## 2023-08-01 DIAGNOSIS — M25.612 DECREASED ROM OF LEFT SHOULDER: ICD-10-CM

## 2023-08-01 DIAGNOSIS — R29.898 SHOULDER WEAKNESS: ICD-10-CM

## 2023-08-01 DIAGNOSIS — G25.89 SCAPULAR DYSKINESIS: ICD-10-CM

## 2023-08-01 DIAGNOSIS — M25.512 CHRONIC PAIN IN LEFT SHOULDER: Primary | ICD-10-CM

## 2023-08-01 PROCEDURE — 97112 NEUROMUSCULAR REEDUCATION: CPT | Mod: HCNC

## 2023-08-01 PROCEDURE — 97110 THERAPEUTIC EXERCISES: CPT | Mod: HCNC

## 2023-08-01 PROCEDURE — 97140 MANUAL THERAPY 1/> REGIONS: CPT | Mod: HCNC

## 2023-08-01 NOTE — PROGRESS NOTES
[facility]   Physical Therapy Treatment Note      Name: Turner Fernandes  Clinic Number: [id number]    Therapy Diagnosis:   Encounter Diagnoses   Name Primary?    Chronic pain in left shoulder Yes    Decreased ROM of left shoulder     Shoulder weakness     Scapular dyskinesis      Physician: Roberta Lutz MD    Visit Date: 8/1/2023    Physician Orders: PT Eval and Treat  Medical Diagnosis from Referral:   M25.512,G89.29 (ICD-10-CM) - Chronic pain in left shoulder   M75.102 (ICD-10-CM) - Non-traumatic rotator cuff tear, left      Evaluation Date: 7/27/2023  Authorization Period Expiration: 12/31/2023  Plan of Care Expiration: 12/1/2023  Visit # / Visits authorized: 1/ 20 (+eval)     Time In: 2:00 pm  Time Out: 2:59  Total Billable Time: 59 minutes     Precautions: Standard    Subjective     Pt reports: noting some painful clicking in L shoulder since his eval- may be thinking about it more. No HEP as he just generally does not feel good. Notes R shoulder discomfort at times after RCR in 2015. LBP recently due to stopping daily walking, used to do 80 miles/week at fitness center. Unsure really why he has gotten lazy. States HEP was confusing as well, needs to review them. Felt better about PT and HEP after session.     He was compliant with home exercise program.  Response to previous treatment: no change  Functional change: no change    Pain: 0/10 to 7/10 at worst  Location: left shoulder      Objective      Objective Measures updated at progress report unless specified.     Treatment     Turner received the treatments listed below:      therapeutic exercises to develop strength, endurance, ROM, flexibility, and posture for 25 minutes including:  UBE 3/3 lv 0  Manual PROM/stretching L shoulder  Wall slides 3 x 15 unilateral  OTB IR 2 x 15 with scap set  Assessment/education    Flexion isometric against wall-NP  Extension isometric against wall -NP    manual therapy techniques: Joint mobilizations  and Soft tissue Mobilization were applied for 10 minutes, including:  Assessment  Gr 2-3 GHJ AP, inferior glide  Gr 3 scapular all planes in SL    neuromuscular re-education activities to improve: Coordination, Kinesthetic, Proprioception, and Posture for 24 minutes. The following activities were included:  GTB scapular retraction 2 x 15 x 5 sec hold  Scapular retraction 20 x 8 sec  OTB ER isometric 6 x 20 sec at neutral with scap set    therapeutic activities to improve functional performance for 0  minutes, including:  NP    Patient Education and Home Exercises       Education provided:   - HEP update, objective findings, tx rationale, expectations, timeframes, importance on compliance HEP, benefits of exercise    Written Home Exercises Provided: yes. Exercises were reviewed and Turner was able to demonstrate them prior to the end of the session.  Turner demonstrated good  understanding of the education provided. 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 He has good ROM with some mild swelling. Definite weakness in cuff and scapular musculature. He seems reluctant to work on HEP to try and help his symptoms and presentation. We discussed the process and timeframes at length with verbalized understanding from patient. No irritability in session, just expected muscle fatigue quickly matching pathology. Pt reported/demonstrated no adverse response or exacerbation of symptoms during today's session.      Turner Is progressing well towards his goals.   Pt prognosis is Good.     Pt will continue to benefit from skilled outpatient physical therapy to address the deficits listed in the problem list box on initial evaluation, provide pt/family education and to maximize pt's level of independence in the home and community environment.     Pt's spiritual, cultural and educational needs considered and pt agreeable to  plan of care and goals.     Anticipated barriers to physical therapy: compliance HEP, willingness to exercise    Short Term Goals:  4 weeks (progressing, not met)   1.Report decreased left shoulder pain < / =  3/10 at max to increase tolerance for HEP/ADLs  2. Increase PROM to full and symmetrical.    3. Increased strength by 1/3 MMT grade in rotator cuff and periscapular musculature to increase tolerance for ADL and work activities.  4. Pt to tolerate HEP to improve ROM and independence with ADL's     Long Term Goals: 8-12 weeks (progressing, not met)   1.Report decreased left shoulder pain  < / =  1 /10 at max to increase tolerance for HEP/ADLs/hobbies  2.Increase pain free AROM to full and symmetrical.  3.Increase strength to >/= 4/5 in rotator cuff and periscapular musculature to increase tolerance for ADL and work activities.  4. Pt goal: reduce pain, improve functional mobility   5. Pt will have improved gcode of CJ (20-40% limited) on FOTO shoulder in order to demonstrate true functional improvement.    Plan     Progress cuff and scapular loading as able. Focus motor control/activation and isometrics for symptom modulation.     Minh Hay, PT

## 2023-08-09 ENCOUNTER — CLINICAL SUPPORT (OUTPATIENT)
Dept: REHABILITATION | Facility: HOSPITAL | Age: 78
End: 2023-08-09
Payer: MEDICARE

## 2023-08-09 DIAGNOSIS — G25.89 SCAPULAR DYSKINESIS: ICD-10-CM

## 2023-08-09 DIAGNOSIS — M25.512 CHRONIC PAIN IN LEFT SHOULDER: Primary | ICD-10-CM

## 2023-08-09 DIAGNOSIS — R29.898 SHOULDER WEAKNESS: ICD-10-CM

## 2023-08-09 DIAGNOSIS — M25.612 DECREASED ROM OF LEFT SHOULDER: ICD-10-CM

## 2023-08-09 DIAGNOSIS — G89.29 CHRONIC PAIN IN LEFT SHOULDER: Primary | ICD-10-CM

## 2023-08-09 PROCEDURE — 97112 NEUROMUSCULAR REEDUCATION: CPT | Mod: HCNC

## 2023-08-09 PROCEDURE — 97530 THERAPEUTIC ACTIVITIES: CPT | Mod: HCNC

## 2023-08-09 PROCEDURE — 97110 THERAPEUTIC EXERCISES: CPT | Mod: HCNC

## 2023-08-09 PROCEDURE — 97140 MANUAL THERAPY 1/> REGIONS: CPT | Mod: HCNC

## 2023-08-09 NOTE — PROGRESS NOTES
[facility]   Physical Therapy Treatment Note      Name: Turner Fernandes  Clinic Number: [id number]    Therapy Diagnosis:   Encounter Diagnoses   Name Primary?    Chronic pain in left shoulder Yes    Decreased ROM of left shoulder     Shoulder weakness     Scapular dyskinesis      Physician: Roberta Lutz MD    Visit Date: 8/9/2023    Physician Orders: PT Eval and Treat  Medical Diagnosis from Referral:   M25.512,G89.29 (ICD-10-CM) - Chronic pain in left shoulder   M75.102 (ICD-10-CM) - Non-traumatic rotator cuff tear, left      Evaluation Date: 7/27/2023  Authorization Period Expiration: 12/31/2023  Plan of Care Expiration: 12/1/2023  Visit # / Visits authorized: 2/ 20 (+eval)     Time In: 9:55 am  Time Out: 10:50  Total Billable Time: 55 minutes     Precautions: Standard    Subjective     Pt reports: he still has painful catching and clicking with abduction reaching, otherwise feeling good. HEP getting better.     He was compliant with home exercise program.  Response to previous treatment: no change  Functional change: no change    Pain: 0/10 to 7/10 at worst  Location: left shoulder      Objective      Objective Measures updated at progress report unless specified.     Treatment     Turner received the treatments listed below:      therapeutic exercises to develop strength, endurance, ROM, flexibility, and posture for 15 minutes including:  UBE 3/3 lv 0  Manual PROM/stretching L shoulder  Assessment/education    manual therapy techniques: Joint mobilizations and Soft tissue Mobilization were applied for 8 minutes, including:  Assessment  Gr 2-3 GHJ AP, inferior glide  Gr 3 scapular all planes in SL    neuromuscular re-education activities to improve: Coordination, Kinesthetic, Proprioception, and Posture for 20 minutes. The following activities were included:  GTB scapular retraction 2 x 15 x 5 sec hold  OTB pull downs 2 x 15 x 3 sec  OTB ER isometric 6 x 20 sec at neutral with scap set  SL  ER 4 x 10 0-1 lbs    therapeutic activities to improve functional performance for 12 minutes, including:  Wall slides 3 x 15 unilateral-NP  Landmine press 4 x 10 0-3 lbs  CC assisted abduction 3 x 15 3 lbs  SL abduction 3 x 10 0 lbs    Patient Education and Home Exercises       Education provided:   - HEP update, objective findings, tx rationale, expectations, timeframes, importance on compliance HEP, benefits of exercise    Written Home Exercises Provided: yes. Exercises were reviewed and Turner was able to demonstrate them prior to the end of the session.  Turner demonstrated good  understanding of the education provided. See EMR under Patient Instructions for exercises provided during therapy sessions    Assessment     Turner continues to show signs of RC dysfunction with OA. His PROM is mostly full and asymptomatic. Abduction and ER remain most difficult and irritable with AROM against gravity AAROM is comfortable. Focusing on motor control and RC activation strategies. Pt reported/demonstrated no adverse response or exacerbation of symptoms during today's session.      Turner Is progressing well towards his goals.   Pt prognosis is Good.     Pt will continue to benefit from skilled outpatient physical therapy to address the deficits listed in the problem list box on initial evaluation, provide pt/family education and to maximize pt's level of independence in the home and community environment.     Pt's spiritual, cultural and educational needs considered and pt agreeable to plan of care and goals.     Anticipated barriers to physical therapy: compliance HEP, willingness to exercise    Short Term Goals:  4 weeks (progressing, not met)   1.Report decreased left shoulder pain < / =  3/10 at max to increase tolerance for HEP/ADLs  2. Increase PROM to full and symmetrical.    3. Increased strength by 1/3 MMT grade in rotator cuff and periscapular musculature to increase tolerance for ADL and work activities.  4. Pt to  tolerate HEP to improve ROM and independence with ADL's     Long Term Goals: 8-12 weeks (progressing, not met)   1.Report decreased left shoulder pain  < / =  1 /10 at max to increase tolerance for HEP/ADLs/hobbies  2.Increase pain free AROM to full and symmetrical.  3.Increase strength to >/= 4/5 in rotator cuff and periscapular musculature to increase tolerance for ADL and work activities.  4. Pt goal: reduce pain, improve functional mobility   5. Pt will have improved gcode of CJ (20-40% limited) on FOTO shoulder in order to demonstrate true functional improvement.    Plan     Progress cuff and scapular loading as able. Focus motor control/activation and isometrics for symptom modulation.     Minh Hay, PT

## 2023-08-14 ENCOUNTER — CLINICAL SUPPORT (OUTPATIENT)
Dept: REHABILITATION | Facility: HOSPITAL | Age: 78
End: 2023-08-14
Payer: MEDICARE

## 2023-08-14 DIAGNOSIS — R29.898 SHOULDER WEAKNESS: ICD-10-CM

## 2023-08-14 DIAGNOSIS — M25.512 CHRONIC PAIN IN LEFT SHOULDER: Primary | ICD-10-CM

## 2023-08-14 DIAGNOSIS — G25.89 SCAPULAR DYSKINESIS: ICD-10-CM

## 2023-08-14 DIAGNOSIS — G89.29 CHRONIC PAIN IN LEFT SHOULDER: Primary | ICD-10-CM

## 2023-08-14 DIAGNOSIS — M25.612 DECREASED ROM OF LEFT SHOULDER: ICD-10-CM

## 2023-08-14 PROCEDURE — 97530 THERAPEUTIC ACTIVITIES: CPT | Mod: HCNC

## 2023-08-14 PROCEDURE — 97140 MANUAL THERAPY 1/> REGIONS: CPT | Mod: HCNC

## 2023-08-14 NOTE — PROGRESS NOTES
"[facility]   Physical Therapy Treatment Note      Name: Turner Fernandes  Clinic Number: [id number]    Therapy Diagnosis:   Encounter Diagnoses   Name Primary?    Chronic pain in left shoulder Yes    Decreased ROM of left shoulder     Shoulder weakness     Scapular dyskinesis      Physician: Roberta Lutz MD    Visit Date: 8/14/2023    Physician Orders: PT Eval and Treat  Medical Diagnosis from Referral:   M25.512,G89.29 (ICD-10-CM) - Chronic pain in left shoulder   M75.102 (ICD-10-CM) - Non-traumatic rotator cuff tear, left   Evaluation Date: 7/27/2023  Authorization Period Expiration: 12/31/2023  Plan of Care Expiration: 12/1/2023  Visit # / Visits authorized: 3 / 20 (+eval)     Time In: 1000 am  Time Out: 1055 am  Total Billable Time: 55 minutes (30 min 1:1)     Precautions: Standard    Subjective     Pt reports: he still has painful catching and clicking with abduction reaching. Not constant pain.     He was compliant with home exercise program.  Response to previous treatment: no change  Functional change: no change    Pain: 3/10 currently to 7/10 at worst  Location: left shoulder      Objective      Objective Measures updated at progress report unless specified.     Treatment     Turner received the treatments listed below:      therapeutic exercises to develop strength, endurance, ROM, flexibility, and posture for 20 minutes including:  PROM table walk backs 20x  ER dowel neutral and at 90 20x each  Dowel flexion in Sidely[place]     manual therapy techniques: Joint mobilizations and Soft tissue Mobilization were applied for 8 minutes, including:  Assessment  Gr 2-3 GHJ AP, inferior glide  Gr 3 scapular all planes in SL    neuromuscular re-education activities to improve: Coordination, Kinesthetic, Proprioception, and Posture for 17 minutes. The following activities were included:  SL Horizontal abduction 3 x 10  SL ER 4 x 10   Abd/ER/IR/Ext isometrics at wall 5" x 20    therapeutic activities " to improve functional performance for 10 minutes, including:  Wall slides 3 x 15 unilateral  Landmine press 4 x 10 0-3 lbs  SL horizontal abduction 3 x 10 0 lbs    Patient Education and Home Exercises       Education provided:   - HEP update, objective findings, tx rationale, expectations, timeframes, importance on compliance HEP, benefits of exercise    Written Home Exercises Provided: yes. Exercises were reviewed and Turner was able to demonstrate them prior to the end of the session.  Turner demonstrated good  understanding of the education provided. See EMR under Patient Instructions for exercises provided during therapy sessions    Assessment     Turner tolerated today's session well without increased symptoms throughout. He did get some catching during manual today.     Turner Is progressing well towards his goals.   Pt prognosis is Good.     Pt will continue to benefit from skilled outpatient physical therapy to address the deficits listed in the problem list box on initial evaluation, provide pt/family education and to maximize pt's level of independence in the home and community environment.     Pt's spiritual, cultural and educational needs considered and pt agreeable to plan of care and goals.     Anticipated barriers to physical therapy: compliance HEP, willingness to exercise    Short Term Goals:  4 weeks (progressing, not met)   1.Report decreased left shoulder pain < / =  3/10 at max to increase tolerance for HEP/ADLs  2. Increase PROM to full and symmetrical.    3. Increased strength by 1/3 MMT grade in rotator cuff and periscapular musculature to increase tolerance for ADL and work activities.  4. Pt to tolerate HEP to improve ROM and independence with ADL's     Long Term Goals: 8-12 weeks (progressing, not met)   1.Report decreased left shoulder pain  < / =  1 /10 at max to increase tolerance for HEP/ADLs/hobbies  2.Increase pain free AROM to full and symmetrical.  3.Increase strength to >/= 4/5 in  rotator cuff and periscapular musculature to increase tolerance for ADL and work activities.  4. Pt goal: reduce pain, improve functional mobility   5. Pt will have improved gcode of CJ (20-40% limited) on FOTO shoulder in order to demonstrate true functional improvement.    Plan     Progress cuff and scapular loading as able. Focus motor control/activation and isometrics for symptom modulation.     Shira Vazquez, PT

## 2023-08-21 ENCOUNTER — CLINICAL SUPPORT (OUTPATIENT)
Dept: REHABILITATION | Facility: HOSPITAL | Age: 78
End: 2023-08-21
Payer: MEDICARE

## 2023-08-21 DIAGNOSIS — M25.512 CHRONIC PAIN IN LEFT SHOULDER: Primary | ICD-10-CM

## 2023-08-21 DIAGNOSIS — R29.898 SHOULDER WEAKNESS: ICD-10-CM

## 2023-08-21 DIAGNOSIS — M25.612 DECREASED ROM OF LEFT SHOULDER: ICD-10-CM

## 2023-08-21 DIAGNOSIS — G25.89 SCAPULAR DYSKINESIS: ICD-10-CM

## 2023-08-21 DIAGNOSIS — G89.29 CHRONIC PAIN IN LEFT SHOULDER: Primary | ICD-10-CM

## 2023-08-21 PROCEDURE — 97110 THERAPEUTIC EXERCISES: CPT | Mod: HCNC

## 2023-08-21 PROCEDURE — 97530 THERAPEUTIC ACTIVITIES: CPT | Mod: HCNC

## 2023-08-21 PROCEDURE — 97112 NEUROMUSCULAR REEDUCATION: CPT | Mod: HCNC

## 2023-08-21 PROCEDURE — 97140 MANUAL THERAPY 1/> REGIONS: CPT | Mod: HCNC

## 2023-08-21 NOTE — PROGRESS NOTES
[facility]   Physical Therapy Treatment Note      Name: Turner Fernandes  Clinic Number: [id number]    Therapy Diagnosis:   Encounter Diagnoses   Name Primary?    Chronic pain in left shoulder Yes    Decreased ROM of left shoulder     Shoulder weakness     Scapular dyskinesis      Physician: Roberta Lutz MD    Visit Date: 8/21/2023    Physician Orders: PT Eval and Treat  Medical Diagnosis from Referral:   M25.512,G89.29 (ICD-10-CM) - Chronic pain in left shoulder   M75.102 (ICD-10-CM) - Non-traumatic rotator cuff tear, left   Evaluation Date: 7/27/2023  Authorization Period Expiration: 12/31/2023  Plan of Care Expiration: 12/1/2023  Visit # / Visits authorized: 4/ 20 (+eval)     Time In: 1000 am  Time Out: 1056 am  Total Billable Time: 56 minutes      Precautions: Standard    Subjective     Pt reports: he still has painful catching and clicking occasionally with associated pain. Unsure if better or same overall. Felt better in session today.     He was compliant with home exercise program.  Response to previous treatment: no change  Functional change: no change    Pain: 0/10 currently to 7/10 at worst  Location: left shoulder      Objective      AROM full and = with exception of L functional IR HBB at T12 v T8 on R    Treatment     Turner received the treatments listed below:      therapeutic exercises to develop strength, endurance, ROM, flexibility, and posture for 10 minutes including:  PROM by PT  Dowel flexion in Sidely[place] x 30-NP  Education/assessment    manual therapy techniques: Joint mobilizations and Soft tissue Mobilization were applied for 12 minutes, including:  Assessment  Gr 3 GHJ AP, inferior glide  Gr 3 scapular all planes in SL  PROM with gr 3 distraction: elevation, ER, IR, abd    neuromuscular re-education activities to improve: Coordination, Kinesthetic, Proprioception, and Posture for 25 minutes. The following activities were included:  SL Horizontal abduction 2 x 10-  stopped due to pain  SL ER 3 x 10 0 lbs; 2 x 8 2 lbs  Abd/flx/Ext isometrics at wall 10 x 10 sec each with scap set  Prone elevation ful ROM with 3 lbs 3 x 10  Pull downs OTB 3 x 12 x 3 sec hold    therapeutic activities to improve functional performance for 9 minutes, including:  Wall slides 3 x 15 unilateral  Landmine press 4 x 10 3->5 lbs    Patient Education and Home Exercises       Education provided:   - HEP update, objective findings, tx rationale, expectations, timeframes, importance on compliance HEP, benefits of exercise    Written Home Exercises Provided: yes. Exercises were reviewed and Turner was able to demonstrate them prior to the end of the session.  Turner demonstrated good  understanding of the education provided. See EMR under Patient Instructions for exercises provided during therapy sessions    Assessment     Turner did well with progression of exercise. SL horizontal abduction became painful on 2nd rd so it was discontinued. 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 All other exercises were tolerated well with just fatigue.  He did get some catching during manual today.     Turner Is progressing well towards his goals.   Pt prognosis is Good.     Pt will continue to benefit from skilled outpatient physical therapy to address the deficits listed in the problem list box on initial evaluation, provide pt/family education and to maximize pt's level of independence in the home and community environment.     Pt's spiritual, cultural and educational needs considered and pt agreeable to plan of care and goals.     Anticipated barriers to physical therapy: compliance HEP, willingness to exercise    Short Term Goals:  4 weeks (progressing, not met)   1.Report decreased left shoulder pain < / =  3/10 at max to increase tolerance for HEP/ADLs  2. Increase PROM to full and symmetrical.    3. Increased strength by 1/3 MMT grade in rotator  cuff and periscapular musculature to increase tolerance for ADL and work activities.  4. Pt to tolerate HEP to improve ROM and independence with ADL's     Long Term Goals: 8-12 weeks (progressing, not met)   1.Report decreased left shoulder pain  < / =  1 /10 at max to increase tolerance for HEP/ADLs/hobbies  2.Increase pain free AROM to full and symmetrical.  3.Increase strength to >/= 4/5 in rotator cuff and periscapular musculature to increase tolerance for ADL and work activities.  4. Pt goal: reduce pain, improve functional mobility   5. Pt will have improved gcode of CJ (20-40% limited) on FOTO shoulder in order to demonstrate true functional improvement.    Plan     Progress cuff and scapular loading as able. Focus motor control/activation and isometrics for symptom modulation.     Minh Hay, PT

## 2023-08-28 ENCOUNTER — CLINICAL SUPPORT (OUTPATIENT)
Dept: REHABILITATION | Facility: HOSPITAL | Age: 78
End: 2023-08-28
Payer: MEDICARE

## 2023-08-28 DIAGNOSIS — M25.612 DECREASED ROM OF LEFT SHOULDER: ICD-10-CM

## 2023-08-28 DIAGNOSIS — G25.89 SCAPULAR DYSKINESIS: ICD-10-CM

## 2023-08-28 DIAGNOSIS — G89.29 CHRONIC PAIN IN LEFT SHOULDER: Primary | ICD-10-CM

## 2023-08-28 DIAGNOSIS — M25.512 CHRONIC PAIN IN LEFT SHOULDER: Primary | ICD-10-CM

## 2023-08-28 DIAGNOSIS — R29.898 SHOULDER WEAKNESS: ICD-10-CM

## 2023-08-28 PROCEDURE — 97110 THERAPEUTIC EXERCISES: CPT | Mod: HCNC

## 2023-08-28 PROCEDURE — 97140 MANUAL THERAPY 1/> REGIONS: CPT | Mod: HCNC

## 2023-08-28 PROCEDURE — 97112 NEUROMUSCULAR REEDUCATION: CPT | Mod: HCNC

## 2023-08-28 NOTE — PROGRESS NOTES
[facility]   Physical Therapy Treatment Note      Name: Turner Fernandes  Clinic Number: [id number]    Therapy Diagnosis:   Encounter Diagnoses   Name Primary?    Chronic pain in left shoulder Yes    Decreased ROM of left shoulder     Shoulder weakness     Scapular dyskinesis      Physician: Roberta Lutz MD    Visit Date: 8/28/2023    Physician Orders: PT Eval and Treat  Medical Diagnosis from Referral:   M25.512,G89.29 (ICD-10-CM) - Chronic pain in left shoulder   M75.102 (ICD-10-CM) - Non-traumatic rotator cuff tear, left   Evaluation Date: 7/27/2023  Authorization Period Expiration: 12/31/2023  Plan of Care Expiration: 12/1/2023  Visit # / Visits authorized: 5/ 20 (+eval)     Time In: 1000 am  Time Out: 1055 am  Total Billable Time: 55 minutes      Precautions: Standard    Subjective     Pt reports: his shoulder feels about the same overall. HEP going well. Still has painful crepitus at times. Felt pretty good in session today, muscle fatigue    He was compliant with home exercise program.  Response to previous treatment: no change  Functional change: no change    Pain: 0/10 currently to 7/10 at worst  Location: left shoulder      Objective      AROM full and = with exception of L functional IR HBB at T12 v T8 on R    Treatment     Turner received the treatments listed below:      therapeutic exercises to develop strength, endurance, ROM, flexibility, and posture for 10 minutes including:  PROM by PT  Standing IR neutral 4 x 10 GTB with scap set  Education/assessment    manual therapy techniques: Joint mobilizations and Soft tissue Mobilization were applied for 12 minutes, including:  Assessment  Gr 3 GHJ AP, inferior glide  Gr 3 scapular all planes in SL  PROM with gr 3 distraction: elevation, ER, IR, abd    neuromuscular re-education activities to improve: Coordination, Kinesthetic, Proprioception, and Posture for 28 minutes. The following activities were included:  SL abduction 3 x 10 0  lbs  SL ER 4 x 10 0 lbs; 2 x 8 2 lbs  Prone elevation ful ROM with 3 lbs 3 x 10  Pull downs OTB 3 x 12 x 3 sec hold  Standing rows 3 x 15 x 5 sec GTB    therapeutic activities to improve functional performance for 5 minutes, including:  Landmine press 4 x 10 5 lbs    Patient Education and Home Exercises       Education provided:   - HEP update, objective findings, tx rationale, expectations, timeframes, importance on compliance HEP, benefits of exercise    Written Home Exercises Provided: yes. Exercises were reviewed and Turner was able to demonstrate them prior to the end of the session.  Turner demonstrated good  understanding of the education provided. See EMR under Patient Instructions for exercises provided during therapy sessions    Assessment     Turner seemed to do better in session today compared to last with less painful clicking. We needed to modify ROM with fatigue to maintain neuromuscular control. Less catching with ROM. Pt reported/demonstrated no adverse response or exacerbation of symptoms during today's session.      Turner Is progressing well towards his goals.   Pt prognosis is Good.     Pt will continue to benefit from skilled outpatient physical therapy to address the deficits listed in the problem list box on initial evaluation, provide pt/family education and to maximize pt's level of independence in the home and community environment.     Pt's spiritual, cultural and educational needs considered and pt agreeable to plan of care and goals.     Anticipated barriers to physical therapy: compliance HEP, willingness to exercise    Short Term Goals:  4 weeks (progressing, not met)   1.Report decreased left shoulder pain < / =  3/10 at max to increase tolerance for HEP/ADLs  2. Increase PROM to full and symmetrical.    3. Increased strength by 1/3 MMT grade in rotator cuff and periscapular musculature to increase tolerance for ADL and work activities.  4. Pt to tolerate HEP to improve ROM and  independence with ADL's     Long Term Goals: 8-12 weeks (progressing, not met)   1.Report decreased left shoulder pain  < / =  1 /10 at max to increase tolerance for HEP/ADLs/hobbies  2.Increase pain free AROM to full and symmetrical.  3.Increase strength to >/= 4/5 in rotator cuff and periscapular musculature to increase tolerance for ADL and work activities.  4. Pt goal: reduce pain, improve functional mobility   5. Pt will have improved gcode of CJ (20-40% limited) on FOTO shoulder in order to demonstrate true functional improvement.    Plan     Progress cuff and scapular loading as able. Focus motor control/activation and isometrics for symptom modulation.     Minh Hay, PT

## 2023-09-05 ENCOUNTER — CLINICAL SUPPORT (OUTPATIENT)
Dept: REHABILITATION | Facility: HOSPITAL | Age: 78
End: 2023-09-05
Payer: MEDICARE

## 2023-09-05 DIAGNOSIS — M25.512 CHRONIC PAIN IN LEFT SHOULDER: Primary | ICD-10-CM

## 2023-09-05 DIAGNOSIS — M25.612 DECREASED ROM OF LEFT SHOULDER: ICD-10-CM

## 2023-09-05 DIAGNOSIS — R29.898 SHOULDER WEAKNESS: ICD-10-CM

## 2023-09-05 DIAGNOSIS — G89.29 CHRONIC PAIN IN LEFT SHOULDER: Primary | ICD-10-CM

## 2023-09-05 DIAGNOSIS — G25.89 SCAPULAR DYSKINESIS: ICD-10-CM

## 2023-09-05 PROCEDURE — 97530 THERAPEUTIC ACTIVITIES: CPT | Mod: HCNC

## 2023-09-05 PROCEDURE — 97112 NEUROMUSCULAR REEDUCATION: CPT | Mod: HCNC

## 2023-09-05 NOTE — PROGRESS NOTES
[facility]   Physical Therapy Treatment Note      Name: Turner Fernandes  Clinic Number: [id number]    Therapy Diagnosis:   Encounter Diagnoses   Name Primary?    Chronic pain in left shoulder Yes    Decreased ROM of left shoulder     Shoulder weakness     Scapular dyskinesis      Physician: Roberta Lutz MD    Visit Date: 9/5/2023    Physician Orders: PT Eval and Treat  Medical Diagnosis from Referral:   M25.512,G89.29 (ICD-10-CM) - Chronic pain in left shoulder   M75.102 (ICD-10-CM) - Non-traumatic rotator cuff tear, left   Evaluation Date: 7/27/2023  Authorization Period Expiration: 12/31/2023  Plan of Care Expiration: 12/1/2023  Visit # / Visits authorized:  20 (+eval)      Time In: 1100 am  Time Out: 1155 am  Total Billable Time: 30 minutes 1:1     Precautions: Standard    Subjective     Pt reports: his shoulder has been hurting more.     He was compliant with home exercise program.  Response to previous treatment: no change  Functional change: no change    Pain: 0/10 currently to 7/10 at worst  Location: left shoulder      Objective      AROM full and = with exception of L functional IR HBB at T12 v T8 on R    Treatment     Turner received the treatments listed below:      Therapeutic exercises to develop strength, endurance, ROM, flexibility, and posture for 10 minutes including:  Bicep eccentrics 3# 2 x 10  Flexion/scaption table slides 20x    Neuromuscular re-education activities to improve: Coordination, Kinesthetic, Proprioception, and Posture for 35 minutes. The following activities were included:  SL abduction 3 x 10 0 lbs  SL ER 4 x 10 0 lbs; 2 x 8 2 lbs  Prone elevation ful ROM with 3 lbs 3 x 10  Pull downs OTB 3 x 12 x 3 sec hold  Standing rows 3 x 15 x 5 sec GTB  Walk outs OTB ER/IR 3 x 10    Therapeutic activities to improve functional performance for 10 minutes, including:  Landmine press 4 x 10 0 lbs  Ball circles on wall 2 x 10  Wall slides ~ p!    Patient Education and Home  Exercises       Education provided:   - HEP update, objective findings, tx rationale, expectations, timeframes, importance on compliance HEP, benefits of exercise    Written Home Exercises Provided: yes. Exercises were reviewed and Turner was able to demonstrate them prior to the end of the session.  Turner demonstrated good  understanding of the education provided. See EMR under Patient Instructions for exercises provided during therapy sessions    Assessment     Flexion and wall slides were painful today, so we held. Patient to make earlier aptmt with doctor to follow up.    Turner Is progressing well towards his goals.   Pt prognosis is Good.     Pt will continue to benefit from skilled outpatient physical therapy to address the deficits listed in the problem list box on initial evaluation, provide pt/family education and to maximize pt's level of independence in the home and community environment.     Pt's spiritual, cultural and educational needs considered and pt agreeable to plan of care and goals.     Anticipated barriers to physical therapy: compliance HEP, willingness to exercise    Short Term Goals:  4 weeks (progressing, not met)   1.Report decreased left shoulder pain < / =  3/10 at max to increase tolerance for HEP/ADLs  2. Increase PROM to full and symmetrical.    3. Increased strength by 1/3 MMT grade in rotator cuff and periscapular musculature to increase tolerance for ADL and work activities.  4. Pt to tolerate HEP to improve ROM and independence with ADL's     Long Term Goals: 8-12 weeks (progressing, not met)   1.Report decreased left shoulder pain  < / =  1 /10 at max to increase tolerance for HEP/ADLs/hobbies  2.Increase pain free AROM to full and symmetrical.  3.Increase strength to >/= 4/5 in rotator cuff and periscapular musculature to increase tolerance for ADL and work activities.  4. Pt goal: reduce pain, improve functional mobility   5. Pt will have improved gcode of CJ (20-40% limited) on  FOTO shoulder in order to demonstrate true functional improvement.    Plan     Progress cuff and scapular loading as able. Focus motor control/activation and isometrics for symptom modulation.     Shira Vazquez, PT

## 2023-09-11 ENCOUNTER — OFFICE VISIT (OUTPATIENT)
Dept: SPORTS MEDICINE | Facility: CLINIC | Age: 78
End: 2023-09-11
Payer: MEDICARE

## 2023-09-11 VITALS
HEART RATE: 92 BPM | BODY MASS INDEX: 26.26 KG/M2 | SYSTOLIC BLOOD PRESSURE: 143 MMHG | WEIGHT: 162.69 LBS | DIASTOLIC BLOOD PRESSURE: 87 MMHG

## 2023-09-11 DIAGNOSIS — G89.29 CHRONIC PAIN IN LEFT SHOULDER: Primary | ICD-10-CM

## 2023-09-11 DIAGNOSIS — M25.512 CHRONIC PAIN IN LEFT SHOULDER: Primary | ICD-10-CM

## 2023-09-11 PROCEDURE — 1101F PR PT FALLS ASSESS DOC 0-1 FALLS W/OUT INJ PAST YR: ICD-10-PCS | Mod: HCNC,CPTII,S$GLB, | Performed by: ORTHOPAEDIC SURGERY

## 2023-09-11 PROCEDURE — 99999 PR PBB SHADOW E&M-EST. PATIENT-LVL III: CPT | Mod: PBBFAC,HCNC,, | Performed by: ORTHOPAEDIC SURGERY

## 2023-09-11 PROCEDURE — 1125F PR PAIN SEVERITY QUANTIFIED, PAIN PRESENT: ICD-10-PCS | Mod: HCNC,CPTII,S$GLB, | Performed by: ORTHOPAEDIC SURGERY

## 2023-09-11 PROCEDURE — 3079F DIAST BP 80-89 MM HG: CPT | Mod: HCNC,CPTII,S$GLB, | Performed by: ORTHOPAEDIC SURGERY

## 2023-09-11 PROCEDURE — 3288F FALL RISK ASSESSMENT DOCD: CPT | Mod: HCNC,CPTII,S$GLB, | Performed by: ORTHOPAEDIC SURGERY

## 2023-09-11 PROCEDURE — 20610 DRAIN/INJ JOINT/BURSA W/O US: CPT | Mod: HCNC,LT,S$GLB, | Performed by: ORTHOPAEDIC SURGERY

## 2023-09-11 PROCEDURE — 1101F PT FALLS ASSESS-DOCD LE1/YR: CPT | Mod: HCNC,CPTII,S$GLB, | Performed by: ORTHOPAEDIC SURGERY

## 2023-09-11 PROCEDURE — 20610 LARGE JOINT ASPIRATION/INJECTION: L SUBACROMIAL BURSA: ICD-10-PCS | Mod: HCNC,LT,S$GLB, | Performed by: ORTHOPAEDIC SURGERY

## 2023-09-11 PROCEDURE — 99999 PR PBB SHADOW E&M-EST. PATIENT-LVL III: ICD-10-PCS | Mod: PBBFAC,HCNC,, | Performed by: ORTHOPAEDIC SURGERY

## 2023-09-11 PROCEDURE — 99214 OFFICE O/P EST MOD 30 MIN: CPT | Mod: 25,HCNC,S$GLB, | Performed by: ORTHOPAEDIC SURGERY

## 2023-09-11 PROCEDURE — 3077F SYST BP >= 140 MM HG: CPT | Mod: HCNC,CPTII,S$GLB, | Performed by: ORTHOPAEDIC SURGERY

## 2023-09-11 PROCEDURE — 3288F PR FALLS RISK ASSESSMENT DOCUMENTED: ICD-10-PCS | Mod: HCNC,CPTII,S$GLB, | Performed by: ORTHOPAEDIC SURGERY

## 2023-09-11 PROCEDURE — 1125F AMNT PAIN NOTED PAIN PRSNT: CPT | Mod: HCNC,CPTII,S$GLB, | Performed by: ORTHOPAEDIC SURGERY

## 2023-09-11 PROCEDURE — 1159F MED LIST DOCD IN RCRD: CPT | Mod: HCNC,CPTII,S$GLB, | Performed by: ORTHOPAEDIC SURGERY

## 2023-09-11 PROCEDURE — 99214 PR OFFICE/OUTPT VISIT, EST, LEVL IV, 30-39 MIN: ICD-10-PCS | Mod: 25,HCNC,S$GLB, | Performed by: ORTHOPAEDIC SURGERY

## 2023-09-11 PROCEDURE — 3079F PR MOST RECENT DIASTOLIC BLOOD PRESSURE 80-89 MM HG: ICD-10-PCS | Mod: HCNC,CPTII,S$GLB, | Performed by: ORTHOPAEDIC SURGERY

## 2023-09-11 PROCEDURE — 1159F PR MEDICATION LIST DOCUMENTED IN MEDICAL RECORD: ICD-10-PCS | Mod: HCNC,CPTII,S$GLB, | Performed by: ORTHOPAEDIC SURGERY

## 2023-09-11 PROCEDURE — 3077F PR MOST RECENT SYSTOLIC BLOOD PRESSURE >= 140 MM HG: ICD-10-PCS | Mod: HCNC,CPTII,S$GLB, | Performed by: ORTHOPAEDIC SURGERY

## 2023-09-11 RX ORDER — TRIAMCINOLONE ACETONIDE 40 MG/ML
80 INJECTION, SUSPENSION INTRA-ARTICULAR; INTRAMUSCULAR
Status: DISCONTINUED | OUTPATIENT
Start: 2023-09-11 | End: 2023-09-11 | Stop reason: HOSPADM

## 2023-09-11 RX ADMIN — TRIAMCINOLONE ACETONIDE 80 MG: 40 INJECTION, SUSPENSION INTRA-ARTICULAR; INTRAMUSCULAR at 11:09

## 2023-09-11 NOTE — PROCEDURES
Large Joint Aspiration/Injection: L subacromial bursa    Date/Time: 9/11/2023 11:45 AM    Performed by: Roberta Lutz MD  Authorized by: Roberta Lutz MD    Consent Done?:  Yes (Verbal)  Indications:  Pain  Site marked: the procedure site was marked    Timeout: prior to procedure the correct patient, procedure, and site was verified    Prep: patient was prepped and draped in usual sterile fashion      Details:  Needle Size:  22 G  Approach:  Posterior  Location:  Shoulder  Site:  L subacromial bursa  Medications:  80 mg triamcinolone acetonide 40 mg/mL  Patient tolerance:  Patient tolerated the procedure well with no immediate complications

## 2023-09-11 NOTE — PROGRESS NOTES
CC: left shoulder pain, patient is retired, referred by Dr. Gomez    Today:   Patient reports he is doing okay. Has been doing well with PT but continues to have pain intermittently. He states he would like to continue to avoid surgery if possible.      Previous hx 7/19/2023  77 y.o. Male RHD reports that the pain is severe and not responding to any conservative care.      Here today for follow up of MRI results     Pain has been present since January of 2023  Denies any injury or trauma or change in activity     He reports that the pain is worse with overhead activity. It also bothers him at night.  He notes pain with most movements and at times has to assist himself with his non involved arm when lifting    He describes his pain as anterior and posterior     Is affecting ADLs.     SANE: 50  Pain today 7/10    He notes a previous right shoulder rotator cuff repair back in 2015     Review of Systems   Constitution: Negative. Negative for chills, fever and night sweats.   HENT: Negative for congestion and headaches.    Eyes: Negative for blurred vision, left vision loss and right vision loss.   Cardiovascular: Negative for chest pain and syncope.   Respiratory: Negative for cough and shortness of breath.    Endocrine: Negative for polydipsia, polyphagia and polyuria.   Hematologic/Lymphatic: Negative for bleeding problem. Does not bruise/bleed easily.   Skin: Negative for dry skin, itching and rash.   Musculoskeletal: Negative for falls and muscle weakness.   Gastrointestinal: Negative for abdominal pain and bowel incontinence.   Genitourinary: Negative for bladder incontinence and nocturia.   Neurological: Negative for disturbances in coordination, loss of balance and seizures.   Psychiatric/Behavioral: Negative for depression. The patient does not have insomnia.    Allergic/Immunologic: Negative for hives and persistent infections.     PAST MEDICAL HISTORY:   Past Medical History:   Diagnosis Date    Cancer      Hyperlipidemia     Hypertension      PAST SURGICAL HISTORY:   Past Surgical History:   Procedure Laterality Date    SHOULDER SURGERY       FAMILY HISTORY:   Family History   Problem Relation Age of Onset    Heart attack Mother      SOCIAL HISTORY:   Social History     Socioeconomic History    Marital status:    Tobacco Use    Smoking status: Never    Smokeless tobacco: Never   Substance and Sexual Activity    Alcohol use: Yes     Comment: social    Drug use: Never    Sexual activity: Not Currently       MEDICATIONS:   Current Outpatient Medications:     amLODIPine (NORVASC) 10 MG tablet, TAKE 1 TABLET EVERY DAY, Disp: 90 tablet, Rfl: 3    aspirin-calcium carbonate 81 mg-300 mg calcium(777 mg) Tab, aspirin 81 mg tablet  Take by oral route., Disp: , Rfl:     finasteride (PROSCAR) 5 mg tablet, TAKE 1 TABLET EVERY DAY, Disp: 90 tablet, Rfl: 3    irbesartan (AVAPRO) 300 MG tablet, TAKE 1 TABLET EVERY DAY, Disp: 90 tablet, Rfl: 1    multivitamin capsule, Take 1 capsule by mouth once daily., Disp: , Rfl:     omeprazole (PRILOSEC) 20 MG capsule, TAKE 1 CAPSULE EVERY DAY, Disp: 90 capsule, Rfl: 3    rosuvastatin (CRESTOR) 10 MG tablet, TAKE 1 TABLET EVERY DAY, Disp: 90 tablet, Rfl: 1    tamsulosin (FLOMAX) 0.4 mg Cap, TAKE 1 CAPSULE EVERY DAY, Disp: 90 capsule, Rfl: 3    vit A/vit C/vit E/zinc/copper (ICAPS AREDS ORAL), Take by mouth., Disp: , Rfl:     vitamin D (VITAMIN D3) 1000 units Tab, Take 1,000 Units by mouth once daily. 2,000 iu daily, Disp: , Rfl:     sildenafiL (VIAGRA) 100 MG tablet, Take 1 tablet (100 mg total) by mouth daily as needed for Erectile Dysfunction., Disp: 10 tablet, Rfl: 1  ALLERGIES:   Review of patient's allergies indicates:   Allergen Reactions    Penicillins     Sulfa (sulfonamide antibiotics)     Sulfur Hives       VITAL SIGNS: BP (!) 143/87   Pulse 92   Wt 73.8 kg (162 lb 11.2 oz)   BMI 26.26 kg/m²      PHYSICAL EXAMINATION:  General:  The patient is alert and oriented x 3.  Mood is  pleasant.  Observation of ears, eyes and nose reveal no gross abnormalities.  No labored breathing observed.  Gait is coordinated. Patient can toe walk and heel walk without difficulty.      left SHOULDER / UPPER EXTREMITY EXAM    OBSERVATION:     Swelling  none  Deformity  none   Discoloration  none   Scapular winging none   Scars   none  Atrophy  none    TENDERNESS / CREPITUS (T/C):          T/C      T/C   Clavicle   -/-  SUPRAspinatus    -/-     AC Jt.    -/-  INFRAspinatus  -/-    SC Jt.    -/-  Deltoid    -/-      G. Tuberosity  -/-  LH BICEP groove  +/-   Acromion:  -/-  Midline Neck   -/-     Scapular Spine -/-  Trapezium   -/-   SMA Scapula  -/-  GH jt. line - post  -/-     Scapulothoracic  -/-         ROM: (* = with pain)  Right shoulder   Left shoulder        AROM (PROM)   AROM (PROM)   FE    170° (175°)     170° (175°)     ER at 0°    40°  (45°)    30°  (35°)   ER at 90° ABD  90°  (90°)    90°  (90°)   IR at 90°  ABD   NA  (40°)     NA  (40°)      IR (spine level)   T10     T12    STRENGTH: (* = with pain) RIGHT SHOULDER  LEFT SHOULDER   SCAPTION at 0  5/5    4/5   SCAPTION at 30  5/5    4+/5    IR    5/5    5/5   ER    5/5    5-/5   BICEPS   5/5    5/5   Deltoid    5/5    5/5     SIGNS:  Painful side       NEER   +    OLEONORS  +    LOPES   +    SPEEDS  +     DROP ARM   neg   BELLY PRESS neg   Superior escape none    LIFT-OFF  neg   X-Body ADD    neg    MOVING VALGUS neg        STABILITY TESTING    RIGHT SHOULDER   LEFT SHOULDER     Translation     Anterior  up face     up face    Posterior  up face    up face    Sulcus   < 10mm    < 10 mm     Signs   Apprehension   neg      neg       Relocation   no change     no change      Jerk test  neg     neg    EXTREMITY NEURO-VASCULAR EXAM    Sensation grossly intact to light touch all dermatomal regions.    DTR 2+ Biceps, Triceps, BR and Negative Alyssas sign   Grossly intact motor function at Elbow, Wrist and Hand   Distal pulses radial and ulnar 2+,  brisk cap refill, symmetric.      NECK:  Painless FROM and spinous processes non-tender. Negative Spurlings sign.      OTHER FINDINGS:  + scapular dyskinesia  Bear Hug: + for pain and weakness     XRAYS reviewed and interpreted personally by me:  Shoulder trauma series left,  were obtained and reviewed  Mild moderate DJD glenohumeral joint particularly inferiorly.  Hypertrophic change cortex anterior posterior humeral head and greater tuberosity region.  No fracture dislocation.  Small inferior spur formation at level of AC joint encroaching on sub acromial space.      MRI: Left shoulder reviewed and interpreted by myself:   Large rotator cuff tear including upper subscapularis, SLAP tear, and biceps tendonitis. After discussion with the patient he understands and would like to consider his options at this time.        ASSESSMENT:    Left Shoulder Rotator Cuff Tear, SLAP, biceps tendonitis.       he would benefit from an arthroscopy, given the above.         PLAN:   Left Shoulder rotator cuff tear      Patient would like to continue with conservative management at this time including physical therapy     PROCEDURE NOTE:   After cortisone consent and post-injection information was given, the shoulder was prepped with betadyne and alcohol. The left subacromial space of the shoulder was injected with 2 cc 40 mg kenalog and 4 cc lidocaine and 4 cc .5% marcaine.   Bandaid was applied. Patient was reminded to rest with RICE for 48 hours post injection and to call clinic immediately for any adverse side effects as explained in clinic today.      If patient decides to proceed with surgery in the future he will likely need a new MRI due to motion artifact     All questions were answered, pt will contact us for questions or concerns in the interim.  Had thorough discussion of non-operative vs operative intervention, and risks and benefits of both.      We have discussed the surgery and recovery of arthroscopic shoulder  surgery. he understands that there may be limited mobility up to several weeks after surgery depending on procedures that are performed at the time of surgery.     The spectrum of treatment options were discussed with the patient, including nonoperative and operative options.  After thorough discussion, the patient would like to consider his options. His surgical plan would include:     left              a. Shoulder arthroscopic rotator cuff repair with Cuffmend vs. Tuberoplasty with cable reconstruction. Repair upper subscapularis               b. Shoulder arthroscopic SAD              c. Shoulder arthroscopic extensive debridement              d. Shoulder arthroscopic biceps tenodesis (vs. subpect tenodesis)              e. Shoulder arthroscopic possible microfracture              f.  Shoulder open reduction internal fixation greater tuberosity     The details of the surgical procedure were explained, including the location of probable incisions and a description of likely hardware and/or grafts to be used.  The patient understands the likely convalescence after surgery.  Also, we have thoroughly discussed the risks, benefits and alternatives to surgery, including, but not limited to, the risk of infection, joint stiffness, blood clot (including DVT and/or pulmonary embolus), neurologic and vascular injury.  It was explained that, if tissue has been repaired or reconstructed, there is a chance of failure, which may require further management.      I made the decision to obtain old records of the patient including previous notes and imaging. I independently reviewed and interpreted the radiographs and/or MRIs today as well as prior imaging.

## 2023-09-12 ENCOUNTER — CLINICAL SUPPORT (OUTPATIENT)
Dept: REHABILITATION | Facility: HOSPITAL | Age: 78
End: 2023-09-12
Payer: MEDICARE

## 2023-09-12 DIAGNOSIS — R29.898 SHOULDER WEAKNESS: ICD-10-CM

## 2023-09-12 DIAGNOSIS — M25.612 DECREASED ROM OF LEFT SHOULDER: ICD-10-CM

## 2023-09-12 DIAGNOSIS — M25.512 CHRONIC PAIN IN LEFT SHOULDER: Primary | ICD-10-CM

## 2023-09-12 DIAGNOSIS — G89.29 CHRONIC PAIN IN LEFT SHOULDER: Primary | ICD-10-CM

## 2023-09-12 DIAGNOSIS — G25.89 SCAPULAR DYSKINESIS: ICD-10-CM

## 2023-09-12 PROCEDURE — 97530 THERAPEUTIC ACTIVITIES: CPT | Mod: HCNC

## 2023-09-12 PROCEDURE — 97112 NEUROMUSCULAR REEDUCATION: CPT | Mod: HCNC

## 2023-09-12 NOTE — PROGRESS NOTES
"[facility]   Physical Therapy Treatment Note      Name: Turner Fernandes  Clinic Number: [id number]    Therapy Diagnosis:   Encounter Diagnoses   Name Primary?    Chronic pain in left shoulder Yes    Decreased ROM of left shoulder     Shoulder weakness     Scapular dyskinesis      Physician: Roberta Lutz MD    Visit Date: 9/12/2023    Physician Orders: PT Eval and Treat  Medical Diagnosis from Referral:   M25.512,G89.29 (ICD-10-CM) - Chronic pain in left shoulder   M75.102 (ICD-10-CM) - Non-traumatic rotator cuff tear, left   Evaluation Date: 7/27/2023  Authorization Period Expiration: 12/31/2023  Plan of Care Expiration: 12/1/2023  Visit # / Visits authorized:  20 (+eval)      Time In: 1050 am  Time Out: 1145 am  Total Billable Time: 30 minutes 1:1     Precautions: Standard    Subjective     Pt reports: got shoulder injection yesterday. Feeling good. Pain not extinct but is <3-4/10    He was compliant with home exercise program.  Response to previous treatment: no change  Functional change: no change    Pain: 0/10 currently to 7/10 at worst  Location: left shoulder      Objective      AROM full and = with exception of L functional IR HBB at T12 v T8 on R    Treatment     Turner received the treatments listed below:      Therapeutic exercises to develop strength, endurance, ROM, flexibility, and posture for 20 minutes including:  Flexion table walk backs 30x  4 way GH isometrics on the wall 5" x 10 each    Neuromuscular re-education activities to improve: Coordination, Kinesthetic, Proprioception, and Posture for 25 minutes. The following activities were included:  SL abduction 3 x 8 0 lbs  SL ER 4 x 8 0 lbs;   Rows GTB  3 x 10  Walk outs OTB ER  IR  OTB 3 x 10    Therapeutic activities to improve functional performance for 10 minutes, including:  Landmine press 4 x 10 0 lbs  Dowel flexion chest press 30x    Patient Education and Home Exercises       Education provided:   - HEP update, objective " findings, tx rationale, expectations, timeframes, importance on compliance HEP, benefits of exercise    Written Home Exercises Provided: yes. Exercises were reviewed and Turner was able to demonstrate them prior to the end of the session.  Turner demonstrated good  understanding of the education provided. See EMR under Patient Instructions for exercises provided during therapy sessions    Assessment     Pt presents 1 day s/p L shoulder CSI. His symptoms have diminished and AROM improved. Pt tolerated session well, asymptomatic throughout. Continue to progress     Turner Is progressing well towards his goals.   Pt prognosis is Good.     Pt will continue to benefit from skilled outpatient physical therapy to address the deficits listed in the problem list box on initial evaluation, provide pt/family education and to maximize pt's level of independence in the home and community environment.     Pt's spiritual, cultural and educational needs considered and pt agreeable to plan of care and goals.     Anticipated barriers to physical therapy: compliance HEP, willingness to exercise    Short Term Goals:  4 weeks (progressing, not met)   1.Report decreased left shoulder pain < / =  3/10 at max to increase tolerance for HEP/ADLs  2. Increase PROM to full and symmetrical.    3. Increased strength by 1/3 MMT grade in rotator cuff and periscapular musculature to increase tolerance for ADL and work activities.  4. Pt to tolerate HEP to improve ROM and independence with ADL's     Long Term Goals: 8-12 weeks (progressing, not met)   1.Report decreased left shoulder pain  < / =  1 /10 at max to increase tolerance for HEP/ADLs/hobbies  2.Increase pain free AROM to full and symmetrical.  3.Increase strength to >/= 4/5 in rotator cuff and periscapular musculature to increase tolerance for ADL and work activities.  4. Pt goal: reduce pain, improve functional mobility   5. Pt will have improved gcode of CJ (20-40% limited) on FOTO shoulder  in order to demonstrate true functional improvement.    Plan     Progress cuff and scapular loading as able. Focus motor control/activation and isometrics for symptom modulation.     Shira Vazquez, PT

## 2023-09-19 ENCOUNTER — CLINICAL SUPPORT (OUTPATIENT)
Dept: REHABILITATION | Facility: HOSPITAL | Age: 78
End: 2023-09-19
Payer: MEDICARE

## 2023-09-19 DIAGNOSIS — G25.89 SCAPULAR DYSKINESIS: ICD-10-CM

## 2023-09-19 DIAGNOSIS — M25.512 CHRONIC PAIN IN LEFT SHOULDER: Primary | ICD-10-CM

## 2023-09-19 DIAGNOSIS — M25.612 DECREASED ROM OF LEFT SHOULDER: ICD-10-CM

## 2023-09-19 DIAGNOSIS — R29.898 SHOULDER WEAKNESS: ICD-10-CM

## 2023-09-19 DIAGNOSIS — G89.29 CHRONIC PAIN IN LEFT SHOULDER: Primary | ICD-10-CM

## 2023-09-19 PROCEDURE — 97112 NEUROMUSCULAR REEDUCATION: CPT | Mod: HCNC

## 2023-09-19 PROCEDURE — 97530 THERAPEUTIC ACTIVITIES: CPT | Mod: HCNC

## 2023-09-19 PROCEDURE — 97110 THERAPEUTIC EXERCISES: CPT | Mod: HCNC

## 2023-09-19 NOTE — PROGRESS NOTES
"[facility]   Physical Therapy Treatment Note      Name: Turner Fernandes  Clinic Number: [id number]    Therapy Diagnosis:   Encounter Diagnoses   Name Primary?    Chronic pain in left shoulder Yes    Decreased ROM of left shoulder     Shoulder weakness     Scapular dyskinesis      Physician: Roberta Lutz MD    Visit Date: 9/19/2023    Physician Orders: PT Eval and Treat  Medical Diagnosis from Referral:   M25.512,G89.29 (ICD-10-CM) - Chronic pain in left shoulder   M75.102 (ICD-10-CM) - Non-traumatic rotator cuff tear, left   Evaluation Date: 7/27/2023  Authorization Period Expiration: 12/31/2023  Plan of Care Expiration: 12/1/2023  Visit # / Visits authorized: 8 / 20 (+eval)      Time In: 1050 am  Time Out: 1150 am  Total Billable Time: 60 minutes 1:1     Precautions: Standard    Subjective     Pt reports: feeling good from shoulder injection. Pt requesting discharge today and in agreement to try exercises on his own.    He was compliant with home exercise program.  Response to previous treatment: no change  Functional change: no change    Pain: 0/10 currently to 7/10 at worst  Location: left shoulder      Objective      AROM full and = with exception of L functional IR HBB at T12 v T8 on R    Treatment     Turner received the treatments listed below:      Therapeutic exercises to develop strength, endurance, ROM, flexibility, and posture for 20 minutes including:  Flexion table walk backs 30x  SL abduction 4 x 8, 0 lbs  SL ER 4 x 8, 1 lbs    Neuromuscular re-education activities to improve: Coordination, Kinesthetic, Proprioception, and Posture for 30 minutes. The following activities were included:  (L) Rows GTB  3 x 10  Walk outs: ER, flex, ext GTB 3 x 10  IR OTB 3 x 10  Prone Scap set 3" 2 x 10    Therapeutic activities to improve functional performance for 10 minutes, including:  Landmine press 4 x 10 2 lbs  (L) Wall slide 3 x 8  Review of HeP    Patient Education and Home Exercises   "     Education provided:   - HEP update  - contact doctor if increased shoulder pain    Written Home Exercises Provided: yes. Exercises were reviewed and Turner was able to demonstrate them prior to the end of the session.  Turner demonstrated good  understanding of the education provided. See EMR under Patient Instructions for exercises provided during therapy sessions    Assessment     Pt feeling well enough to do his exercises on his own. Independent with HEP, relatively asymptomatic and is appropriate for DC home.     Turner Is progressing well towards his goals.   Pt prognosis is Good.     Pt will continue to benefit from skilled outpatient physical therapy to address the deficits listed in the problem list box on initial evaluation, provide pt/family education and to maximize pt's level of independence in the home and community environment.     Pt's spiritual, cultural and educational needs considered and pt agreeable to plan of care and goals.     Anticipated barriers to physical therapy: compliance HEP, willingness to exercise    Short Term Goals:  4 weeks - MET   1.Report decreased left shoulder pain < / =  3/10 at max to increase tolerance for HEP/ADLs  2. Increase PROM to full and symmetrical.    3. Increased strength by 1/3 MMT grade in rotator cuff and periscapular musculature to increase tolerance for ADL and work activities.  4. Pt to tolerate HEP to improve ROM and independence with ADL's     Long Term Goals: 8-12 weeks (progressing, not met)   1.Report decreased left shoulder pain  < / =  1 /10 at max to increase tolerance for HEP/ADLs/hobbies  2.Increase pain free AROM to full and symmetrical.  3.Increase strength to >/= 4/5 in rotator cuff and periscapular musculature to increase tolerance for ADL and work activities.  4. Pt goal: reduce pain, improve functional mobility   5. Pt will have improved gcode of CJ (20-40% limited) on FOTO shoulder in order to demonstrate true functional  improvement.    Plan     Discharge from formal PT to continue with HEP    Shira Vazquez, PT

## 2023-10-03 DIAGNOSIS — E83.52 HYPERCALCEMIA: Primary | ICD-10-CM

## 2023-10-18 NOTE — TELEPHONE ENCOUNTER
No care due was identified.  [facility] Embedded Care Due Messages. Reference number: [id number].   10/18/2023 10:23:26 AM CDT

## 2023-10-19 RX ORDER — ROSUVASTATIN CALCIUM 10 MG/1
TABLET, COATED ORAL
Qty: 90 TABLET | Refills: 3 | Status: SHIPPED | OUTPATIENT
Start: 2023-10-19

## 2023-10-19 NOTE — TELEPHONE ENCOUNTER
Refill Decision Note   Turner Fernandes  is requesting a refill authorization.  Brief Assessment and Rationale for Refill:  Approve     Medication Therapy Plan:         Comments:     Note composed:8:25 AM 10/19/2023             Appointments     Last Visit   7/26/2023 Tera Gomez MD   Next Visit   2/1/2024 Tera Gomez MD

## 2023-11-08 NOTE — TELEPHONE ENCOUNTER
No care due was identified.  [facility] Embedded Care Due Messages. Reference number: [id number].   11/08/2023 12:39:04 PM CST

## 2023-11-08 NOTE — TELEPHONE ENCOUNTER
Refill Routing Note   Medication(s) are not appropriate for processing by [facility] for the following reason(s):      Required vitals abnormal    [facility] action(s):  Defer Care Due:  None identified            Appointments  past 12m or future 3m with PCP    Date Provider   Last Visit   7/26/2023 Tera Gomez MD   Next Visit   2/1/2024 Tera Gomez MD   ED visits in past 90 days: 0        Note composed:4:15 PM 11/08/2023

## 2023-11-09 RX ORDER — IRBESARTAN 300 MG/1
TABLET ORAL
Qty: 90 TABLET | Refills: 10 | Status: SHIPPED | OUTPATIENT
Start: 2023-11-09

## 2024-01-22 ENCOUNTER — TELEPHONE (OUTPATIENT)
Dept: PRIMARY CARE CLINIC | Facility: CLINIC | Age: 79
End: 2024-01-22
Payer: MEDICARE

## 2024-01-22 NOTE — TELEPHONE ENCOUNTER
----- Message from Jami Romero sent at 1/22/2024 12:51 PM CST -----  Contact: [phone]  [facility] is requesting a call from the staff regarding: HH is received order from [facility] and will to know if Dr. Gomez  will f/u on this order.

## 2024-01-23 PROCEDURE — G0180 MD CERTIFICATION HHA PATIENT: HCPCS | Mod: ,,, | Performed by: INTERNAL MEDICINE

## 2024-01-25 ENCOUNTER — TELEPHONE (OUTPATIENT)
Dept: ADMINISTRATIVE | Facility: HOSPITAL | Age: 79
End: 2024-01-25

## 2024-01-25 ENCOUNTER — PATIENT OUTREACH (OUTPATIENT)
Dept: ADMINISTRATIVE | Facility: HOSPITAL | Age: 79
End: 2024-01-25
Payer: MEDICARE

## 2024-01-25 ENCOUNTER — TELEPHONE (OUTPATIENT)
Dept: PRIMARY CARE CLINIC | Facility: CLINIC | Age: 79
End: 2024-01-25
Payer: MEDICARE

## 2024-01-25 NOTE — PROGRESS NOTES
Health Maintenance Due   Topic Date Due    Shingles Vaccine (1 of 2) Never done    RSV Vaccine (Age 60+ and Pregnant patients) (1 - 1-dose 60+ series) Never done    Influenza Vaccine (1) 09/01/2023    COVID-19 Vaccine (7 - 2023-24 season) 09/01/2023      Chart reviewed. Triggered LINKS. Updated Care Everywhere.     Jennifer Bonilla CMA  Population Health Care Coordinator  Primary Care Team

## 2024-01-25 NOTE — TELEPHONE ENCOUNTER
----- Message from Lynda Hernández sent at 1/25/2024  8:59 AM CST -----  Contact: Turner (son) [phone]  Symptom: Slurred Speech  Outcome: Instruct patient to Call 911 NOW!  Reason: Acting confused    The caller rejected this outcomePt son requesting a call in regards to neuro.    Please call and advise

## 2024-01-25 NOTE — TELEPHONE ENCOUNTER
Dr. Gomez aware    Spoke to son made sure he is calling 911 or bringing to ER for patient actively acting confused and have very slurred speech.

## 2024-02-01 ENCOUNTER — OFFICE VISIT (OUTPATIENT)
Dept: PRIMARY CARE CLINIC | Facility: CLINIC | Age: 79
End: 2024-02-01
Payer: MEDICARE

## 2024-02-01 ENCOUNTER — PATIENT MESSAGE (OUTPATIENT)
Dept: PRIMARY CARE CLINIC | Facility: CLINIC | Age: 79
End: 2024-02-01

## 2024-02-01 ENCOUNTER — LAB VISIT (OUTPATIENT)
Dept: LAB | Facility: HOSPITAL | Age: 79
End: 2024-02-01
Attending: INTERNAL MEDICINE
Payer: MEDICARE

## 2024-02-01 VITALS
OXYGEN SATURATION: 95 % | DIASTOLIC BLOOD PRESSURE: 80 MMHG | WEIGHT: 163.13 LBS | SYSTOLIC BLOOD PRESSURE: 122 MMHG | RESPIRATION RATE: 18 BRPM | BODY MASS INDEX: 26.22 KG/M2 | TEMPERATURE: 98 F | HEART RATE: 90 BPM | HEIGHT: 66 IN

## 2024-02-01 DIAGNOSIS — I10 BENIGN ESSENTIAL HYPERTENSION: ICD-10-CM

## 2024-02-01 DIAGNOSIS — F10.10 ALCOHOL ABUSE: ICD-10-CM

## 2024-02-01 DIAGNOSIS — F10.10 ALCOHOL ABUSE: Primary | ICD-10-CM

## 2024-02-01 DIAGNOSIS — E83.42 HYPOMAGNESEMIA: ICD-10-CM

## 2024-02-01 LAB
ALBUMIN SERPL BCP-MCNC: 3.9 G/DL (ref 3.5–5.2)
ALP SERPL-CCNC: 61 U/L (ref 55–135)
ALT SERPL W/O P-5'-P-CCNC: 35 U/L (ref 10–44)
ANION GAP SERPL CALC-SCNC: 10 MMOL/L (ref 8–16)
AST SERPL-CCNC: 30 U/L (ref 10–40)
BILIRUB SERPL-MCNC: 0.5 MG/DL (ref 0.1–1)
BUN SERPL-MCNC: 17 MG/DL (ref 8–23)
CALCIUM SERPL-MCNC: 11.9 MG/DL (ref 8.7–10.5)
CHLORIDE SERPL-SCNC: 106 MMOL/L (ref 95–110)
CO2 SERPL-SCNC: 27 MMOL/L (ref 23–29)
CREAT SERPL-MCNC: 1 MG/DL (ref 0.5–1.4)
EST. GFR  (NO RACE VARIABLE): >60 ML/MIN/1.73 M^2
GLUCOSE SERPL-MCNC: 96 MG/DL (ref 70–110)
MAGNESIUM SERPL-MCNC: 1.3 MG/DL (ref 1.6–2.6)
POTASSIUM SERPL-SCNC: 4 MMOL/L (ref 3.5–5.1)
PROT SERPL-MCNC: 7.1 G/DL (ref 6–8.4)
SODIUM SERPL-SCNC: 143 MMOL/L (ref 136–145)

## 2024-02-01 PROCEDURE — 3288F FALL RISK ASSESSMENT DOCD: CPT | Mod: CPTII,S$GLB,, | Performed by: INTERNAL MEDICINE

## 2024-02-01 PROCEDURE — 3074F SYST BP LT 130 MM HG: CPT | Mod: CPTII,S$GLB,, | Performed by: INTERNAL MEDICINE

## 2024-02-01 PROCEDURE — 1126F AMNT PAIN NOTED NONE PRSNT: CPT | Mod: CPTII,S$GLB,, | Performed by: INTERNAL MEDICINE

## 2024-02-01 PROCEDURE — 36415 COLL VENOUS BLD VENIPUNCTURE: CPT | Performed by: INTERNAL MEDICINE

## 2024-02-01 PROCEDURE — 99999 PR PBB SHADOW E&M-EST. PATIENT-LVL IV: CPT | Mod: PBBFAC,,, | Performed by: INTERNAL MEDICINE

## 2024-02-01 PROCEDURE — 3079F DIAST BP 80-89 MM HG: CPT | Mod: CPTII,S$GLB,, | Performed by: INTERNAL MEDICINE

## 2024-02-01 PROCEDURE — 1160F RVW MEDS BY RX/DR IN RCRD: CPT | Mod: CPTII,S$GLB,, | Performed by: INTERNAL MEDICINE

## 2024-02-01 PROCEDURE — 1100F PTFALLS ASSESS-DOCD GE2>/YR: CPT | Mod: CPTII,S$GLB,, | Performed by: INTERNAL MEDICINE

## 2024-02-01 PROCEDURE — 99213 OFFICE O/P EST LOW 20 MIN: CPT | Mod: S$GLB,,, | Performed by: INTERNAL MEDICINE

## 2024-02-01 PROCEDURE — 80053 COMPREHEN METABOLIC PANEL: CPT | Mod: HCNC | Performed by: INTERNAL MEDICINE

## 2024-02-01 PROCEDURE — 1159F MED LIST DOCD IN RCRD: CPT | Mod: CPTII,S$GLB,, | Performed by: INTERNAL MEDICINE

## 2024-02-01 PROCEDURE — 83735 ASSAY OF MAGNESIUM: CPT | Mod: HCNC | Performed by: INTERNAL MEDICINE

## 2024-02-01 RX ORDER — LANOLIN ALCOHOL/MO/W.PET/CERES
1 CREAM (GRAM) TOPICAL 2 TIMES DAILY
COMMUNITY
Start: 2024-01-21 | End: 2024-02-15 | Stop reason: SDUPTHER

## 2024-02-01 RX ORDER — LANOLIN ALCOHOL/MO/W.PET/CERES
1 CREAM (GRAM) TOPICAL DAILY
COMMUNITY
Start: 2024-01-22 | End: 2024-02-15 | Stop reason: SDUPTHER

## 2024-02-01 RX ORDER — DIAZEPAM 2 MG/1
2 TABLET ORAL EVERY 6 HOURS PRN
COMMUNITY
Start: 2024-01-21

## 2024-02-01 RX ORDER — FOLIC ACID 1 MG/1
1 TABLET ORAL DAILY
COMMUNITY
Start: 2024-01-22 | End: 2024-02-15 | Stop reason: SDUPTHER

## 2024-02-01 NOTE — PROGRESS NOTES
[facility] Note    Chief Complaint      Chief Complaint   Patient presents with    Follow-up     6m    Hospital Follow Up       History of Present Illness      Turner Fernandes is a 78 y.o. male who presents today for   Chief Complaint   Patient presents with    Follow-up     6m    Hospital Follow Up   .  Patient comes to appointment here for tcc f/u  Transitional Care Note    Family and/or Caretaker present at visit?  Yes.  Diagnostic tests reviewed/disposition: No diagnosic tests pending after this hospitalization.  Disease/illness education: yes  Home health/community services discussion/referrals:  currently  Establishment or re-establishment of referral orders for community resources: No other necessary community resources.   Discussion with other health care providers: No discussion with other health care providers necessary.     He has been off etoh for 2 weeks . I have reviewed his hops admission and d/c summary . Needs repeat labs today. He will be seeing dr lopez neurologist soon . Offered addiction medicine as well .    HPI    No problem-specific Assessment & Plan notes found for this encounter.       Problem List Items Addressed This Visit          Psychiatric    Alcohol abuse - Primary    Overview     See eduar madrigal labs to assess electrolyte status            Cardiac/Vascular    Benign essential hypertension    Overview     bp well controlled on current regimen is off hctz             Renal/    Hypomagnesemia    Overview     Repeat magnesium level .               Past Medical History:  Past Medical History:   Diagnosis Date    Cancer     Hyperlipidemia     Hypertension        Past Surgical History:  Past Surgical History:   Procedure Laterality Date    SHOULDER SURGERY         Family History:  family history includes Heart attack in his mother.     Social History:  Social History     Socioeconomic History    Marital status:    Tobacco Use    Smoking status: Never     Smokeless tobacco: Never   Substance and Sexual Activity    Alcohol use: Yes     Comment: social    Drug use: Never    Sexual activity: Not Currently       Review of Systems:   Review of Systems   Constitutional:  Negative for fever and weight loss.   HENT:  Negative for congestion, hearing loss and sore throat.    Eyes:  Negative for blurred vision.   Respiratory:  Negative for cough and shortness of breath.    Cardiovascular:  Negative for chest pain, palpitations, claudication and leg swelling.   Gastrointestinal:  Negative for abdominal pain, constipation, diarrhea and heartburn.   Genitourinary:  Negative for dysuria.   Musculoskeletal:  Negative for back pain and myalgias.   Skin:  Negative for rash.   Neurological:  Positive for weakness. Negative for focal weakness and headaches.   Psychiatric/Behavioral:  Negative for depression and suicidal ideas. The patient is not nervous/anxious.         Medications:  Outpatient Encounter Medications as of 2/1/2024   Medication Sig Dispense Refill    amLODIPine (NORVASC) 10 MG tablet TAKE 1 TABLET EVERY DAY 90 tablet 3    aspirin-calcium carbonate 81 mg-300 mg calcium(777 mg) Tab aspirin 81 mg tablet   Take by oral route.      diazePAM (VALIUM) 2 MG tablet Take 2 mg by mouth every 6 (six) hours as needed.      finasteride (PROSCAR) 5 mg tablet TAKE 1 TABLET EVERY DAY 90 tablet 3    folic acid (FOLVITE) 1 MG tablet Take 1 tablet by mouth once daily.      irbesartan (AVAPRO) 300 MG tablet TAKE 1 TABLET EVERY DAY 90 tablet 10    magnesium oxide (MAG-OX) 400 mg (241.3 mg magnesium) tablet Take 1 tablet by mouth 2 (two) times daily.      multivitamin capsule Take 1 capsule by mouth once daily.      omeprazole (PRILOSEC) 20 MG capsule TAKE 1 CAPSULE EVERY DAY 90 capsule 3    rosuvastatin (CRESTOR) 10 MG tablet TAKE 1 TABLET EVERY DAY 90 tablet 3    sildenafiL (VIAGRA) 100 MG tablet Take 1 tablet (100 mg total) by mouth daily as needed for Erectile Dysfunction. 10 tablet 1  "   tamsulosin (FLOMAX) 0.4 mg Cap TAKE 1 CAPSULE EVERY DAY 90 capsule 3    thiamine 100 MG tablet Take 1 tablet by mouth once daily.      vit A/vit C/vit E/zinc/copper (ICAPS AREDS ORAL) Take by mouth.      vitamin D (VITAMIN D3) 1000 units Tab Take 1,000 Units by mouth once daily. 2,000 iu daily       Facility-Administered Encounter Medications as of 2/1/2024   Medication Dose Route Frequency Provider Last Rate Last Admin    [DISCONTINUED] GENERIC EXTERNAL MEDICATION     Provider, Generic External Data           Allergies:  Review of patient's allergies indicates:   Allergen Reactions    Penicillins     Sulfa (sulfonamide antibiotics)     Sulfur Hives         Physical Exam      Vitals:    02/01/24 0933   BP: 122/80   Pulse: 90   Resp: 18   Temp: 98 °F (36.7 °C)        Vital Signs  Temp: 98 °F (36.7 °C)  Temp Source: Oral  Pulse: 90  Resp: 18  SpO2: 95 %  BP: 122/80  BP Location: Right arm  Patient Position: Sitting  Pain Score: 0-No pain  Height and Weight  Height: 5' 6" (167.6 cm)  Weight: 74 kg (163 lb 2.3 oz)  BSA (Calculated - sq m): 1.86 sq meters  BMI (Calculated): 26.3  Weight in (lb) to have BMI = 25: 154.6]     Body mass index is 26.33 kg/m².    Physical Exam  Constitutional:       Appearance: Normal appearance. He is well-developed.   HENT:      Head: Normocephalic.   Eyes:      Pupils: Pupils are equal, round, and reactive to light.   Neck:      Thyroid: No thyromegaly.   Cardiovascular:      Rate and Rhythm: Normal rate and regular rhythm.      Heart sounds: No murmur heard.     No friction rub. No gallop.   Pulmonary:      Effort: Pulmonary effort is normal.      Breath sounds: Normal breath sounds.   Abdominal:      General: Bowel sounds are normal.      Palpations: Abdomen is soft.   Musculoskeletal:         General: Normal range of motion.      Cervical back: Normal range of motion.   Skin:     General: Skin is warm and dry.   Neurological:      Mental Status: He is alert and oriented to person, " "place, and time.      Sensory: No sensory deficit.      Motor: Weakness present.   Psychiatric:         Behavior: Behavior normal.          Laboratory:  CBC:  Recent Labs   Lab Result Units 01/20/24  0843 01/21/24  0535   WBC 103/uL 4.6 4.5   Hemoglobin gm/dL 12.3* 12.9*   Hematocrit % 37.1* 39.0*   MCV fL 103.9* 104.0*   MCH pg 34.4* 34.5*   MCHC g/dL 33.1 33.2     CMP:  No results for input(s): "GLU", "CALCIUM", "ALBUMIN", "PROT", "NA", "K", "CO2", "CL", "BUN", "ALKPHOS", "ALT", "AST", "BILITOT" in the last 2160 hours.    Invalid input(s): "CREATININ"  URINALYSIS:  No results for input(s): "COLORU", "CLARITYU", "SPECGRAV", "PHUR", "PROTEINUA", "GLUCOSEU", "BILIRUBINCON", "BLOODU", "WBCU", "RBCU", "BACTERIA", "MUCUS", "NITRITE", "LEUKOCYTESUR", "UROBILINOGEN", "HYALINECASTS" in the last 2160 hours.   LIPIDS:  Recent Labs   Lab Result Units 01/18/24 2058   TSH uIU/mL 2.27     TSH:  Recent Labs   Lab Result Units 01/18/24 2058   TSH uIU/mL 2.27     A1C:  No results for input(s): "HGBA1C" in the last 2160 hours.    Radiology:        Assessment:     Turner Fernandes is a 78 y.o.male with:    Alcohol abuse  -     Comprehensive Metabolic Panel; Future; Expected date: 02/01/2024    Hypomagnesemia  -     Magnesium; Future; Expected date: 02/01/2024  -     Comprehensive Metabolic Panel; Future; Expected date: 02/01/2024    Benign essential hypertension                Plan:     Problem List Items Addressed This Visit          Psychiatric    Alcohol abuse - Primary    Overview     See eduar madrigal labs to assess electrolyte status            Cardiac/Vascular    Benign essential hypertension    Overview     bp well controlled on current regimen is off hctz             Renal/    Hypomagnesemia    Overview     Repeat magnesium level .              As above, continue current medications and maintain follow up with specialists.  Return to clinic in 6 months.      Tera Gomez  [facility]

## 2024-02-02 ENCOUNTER — PATIENT MESSAGE (OUTPATIENT)
Dept: PRIMARY CARE CLINIC | Facility: CLINIC | Age: 79
End: 2024-02-02
Payer: MEDICARE

## 2024-02-14 DIAGNOSIS — F10.10 ALCOHOL ABUSE: Primary | ICD-10-CM

## 2024-02-14 NOTE — TELEPHONE ENCOUNTER
No care due was identified.  [facility] Embedded Care Due Messages. Reference number: [id number].   2/14/2024 11:07:10 AM CST

## 2024-02-15 RX ORDER — AMLODIPINE BESYLATE 10 MG/1
10 TABLET ORAL DAILY
Qty: 90 TABLET | Refills: 3 | Status: SHIPPED | OUTPATIENT
Start: 2024-02-15

## 2024-02-15 RX ORDER — OMEPRAZOLE 20 MG/1
20 CAPSULE, DELAYED RELEASE ORAL DAILY
Qty: 90 CAPSULE | Refills: 3 | Status: SHIPPED | OUTPATIENT
Start: 2024-02-15

## 2024-02-15 RX ORDER — LANOLIN ALCOHOL/MO/W.PET/CERES
100 CREAM (GRAM) TOPICAL DAILY
Qty: 90 TABLET | Refills: 3 | Status: SHIPPED | OUTPATIENT
Start: 2024-02-15

## 2024-02-15 RX ORDER — FINASTERIDE 5 MG/1
5 TABLET, FILM COATED ORAL DAILY
Qty: 90 TABLET | Refills: 3 | Status: SHIPPED | OUTPATIENT
Start: 2024-02-15

## 2024-02-15 RX ORDER — TAMSULOSIN HYDROCHLORIDE 0.4 MG/1
0.4 CAPSULE ORAL DAILY
Qty: 90 CAPSULE | Refills: 3 | Status: SHIPPED | OUTPATIENT
Start: 2024-02-15

## 2024-02-15 RX ORDER — LANOLIN ALCOHOL/MO/W.PET/CERES
1 CREAM (GRAM) TOPICAL 2 TIMES DAILY
Qty: 180 TABLET | Refills: 3 | Status: SHIPPED | OUTPATIENT
Start: 2024-02-15

## 2024-02-15 RX ORDER — FOLIC ACID 1 MG/1
1000 TABLET ORAL DAILY
Qty: 90 TABLET | Refills: 3 | Status: SHIPPED | OUTPATIENT
Start: 2024-02-15

## 2024-02-15 NOTE — TELEPHONE ENCOUNTER
----- Message from [facility] S Armin sent at 2/15/2024  1:00 PM CST -----  Contact: Mobile  [phone] Patients son Mr. Nina  Requesting an RX refill or new RX.  Is this a refill or new RX: New  RX name and strength Vitamin B 1 100 MG Tab   Is this a 30 day or 90 day RX: 90  Pharmacy name and phone # [facility] [address]  [address] [id number]  Phone: [phone] Fax: [phone]  The doctors have asked that we provide their patients with the following 2 reminders -- prescription refills can take up to 72 hours, and a friendly reminder that in the future you can use your [facility] account to request refills:   Comment: These medications were prescribed by another provider. Non [facility] doctor.     Requesting an RX refill or new RX.  Is this a refill or new RX: New  RX name and strength   folic acid (FOLVITE) 1 MG tablet  Is this a 30 day or 90 day RX: 90  Pharmacy name and phone # [facility] - [place], [place] - [address]  [address] [address]  Phone: [phone] Fax: [phone]  The doctors have asked that we provide their patients with the following 2 reminders -- prescription refills can take up to 72 hours, and a friendly reminder that in the future you can use your [facility] account to request refills:     Requesting an RX refill or new RX.  Is this a refill or new RX: New  RX name and strength magnesium oxide (MAG-OX) 400 mg (241.3 mg magnesium) tablet  Is this a 30 day or 90 day RX: 90  Pharmacy name and phone # [facility] - [facility] [address]  [address] [id number]  Phone: [phone] Fax: [phone]  The doctors have asked that we provide their patients with the following 2 reminders -- prescription refills can take up to 72 hours, and a friendly reminder that in the future you can use your [facility] account to request  refills:

## 2024-02-15 NOTE — TELEPHONE ENCOUNTER
Refill Routing Note   Medication(s) are not appropriate for processing by [facility] for the following reason(s):        Outside of protocol: non-delegated    [facility] action(s):  Route  Approve      Medication Therapy Plan:         Appointments  past 12m or future 3m with PCP    Date Provider   Last Visit   2/1/2024 Tera Gomez MD   Next Visit   Visit date not found Tera Gomez MD   ED visits in past 90 days: 0        Note composed:2:44 PM 02/15/2024

## 2024-02-16 ENCOUNTER — EXTERNAL HOME HEALTH (OUTPATIENT)
Dept: HOME HEALTH SERVICES | Facility: HOSPITAL | Age: 79
End: 2024-02-16
Payer: MEDICARE

## 2024-02-27 ENCOUNTER — DOCUMENT SCAN (OUTPATIENT)
Dept: HOME HEALTH SERVICES | Facility: HOSPITAL | Age: 79
End: 2024-02-27
Payer: MEDICARE

## 2024-03-05 ENCOUNTER — DOCUMENT SCAN (OUTPATIENT)
Dept: HOME HEALTH SERVICES | Facility: HOSPITAL | Age: 79
End: 2024-03-05
Payer: MEDICARE

## 2024-03-08 ENCOUNTER — OFFICE VISIT (OUTPATIENT)
Dept: ENDOCRINOLOGY | Facility: CLINIC | Age: 79
End: 2024-03-08
Payer: MEDICARE

## 2024-03-08 ENCOUNTER — LAB VISIT (OUTPATIENT)
Dept: LAB | Facility: HOSPITAL | Age: 79
End: 2024-03-08
Attending: INTERNAL MEDICINE
Payer: MEDICARE

## 2024-03-08 VITALS
DIASTOLIC BLOOD PRESSURE: 70 MMHG | BODY MASS INDEX: 25.44 KG/M2 | WEIGHT: 157.63 LBS | OXYGEN SATURATION: 96 % | SYSTOLIC BLOOD PRESSURE: 110 MMHG | HEART RATE: 81 BPM

## 2024-03-08 DIAGNOSIS — E21.3 HYPERPARATHYROIDISM: Primary | ICD-10-CM

## 2024-03-08 DIAGNOSIS — E21.0 PRIMARY HYPERPARATHYROIDISM: ICD-10-CM

## 2024-03-08 DIAGNOSIS — E21.3 HYPERPARATHYROIDISM: ICD-10-CM

## 2024-03-08 DIAGNOSIS — M81.0 AGE-RELATED OSTEOPOROSIS WITHOUT CURRENT PATHOLOGICAL FRACTURE: Primary | ICD-10-CM

## 2024-03-08 LAB
25(OH)D3+25(OH)D2 SERPL-MCNC: 58 NG/ML (ref 30–96)
ALBUMIN SERPL BCP-MCNC: 3.9 G/DL (ref 3.5–5.2)
CALCIUM SERPL-MCNC: 11.3 MG/DL (ref 8.7–10.5)
CREAT SERPL-MCNC: 1.2 MG/DL (ref 0.5–1.4)
EST. GFR  (NO RACE VARIABLE): >60 ML/MIN/1.73 M^2
PTH-INTACT SERPL-MCNC: 80.6 PG/ML (ref 9–77)

## 2024-03-08 PROCEDURE — 99214 OFFICE O/P EST MOD 30 MIN: CPT | Mod: S$GLB,,, | Performed by: INTERNAL MEDICINE

## 2024-03-08 PROCEDURE — 1101F PT FALLS ASSESS-DOCD LE1/YR: CPT | Mod: CPTII,S$GLB,, | Performed by: INTERNAL MEDICINE

## 2024-03-08 PROCEDURE — 3078F DIAST BP <80 MM HG: CPT | Mod: CPTII,S$GLB,, | Performed by: INTERNAL MEDICINE

## 2024-03-08 PROCEDURE — 36415 COLL VENOUS BLD VENIPUNCTURE: CPT | Performed by: INTERNAL MEDICINE

## 2024-03-08 PROCEDURE — 82565 ASSAY OF CREATININE: CPT | Mod: HCNC | Performed by: INTERNAL MEDICINE

## 2024-03-08 PROCEDURE — 3074F SYST BP LT 130 MM HG: CPT | Mod: CPTII,S$GLB,, | Performed by: INTERNAL MEDICINE

## 2024-03-08 PROCEDURE — 1160F RVW MEDS BY RX/DR IN RCRD: CPT | Mod: CPTII,S$GLB,, | Performed by: INTERNAL MEDICINE

## 2024-03-08 PROCEDURE — 1126F AMNT PAIN NOTED NONE PRSNT: CPT | Mod: CPTII,S$GLB,, | Performed by: INTERNAL MEDICINE

## 2024-03-08 PROCEDURE — 82040 ASSAY OF SERUM ALBUMIN: CPT | Mod: HCNC | Performed by: INTERNAL MEDICINE

## 2024-03-08 PROCEDURE — 3288F FALL RISK ASSESSMENT DOCD: CPT | Mod: CPTII,S$GLB,, | Performed by: INTERNAL MEDICINE

## 2024-03-08 PROCEDURE — 82306 VITAMIN D 25 HYDROXY: CPT | Mod: HCNC | Performed by: INTERNAL MEDICINE

## 2024-03-08 PROCEDURE — 99999 PR PBB SHADOW E&M-EST. PATIENT-LVL III: CPT | Mod: PBBFAC,,, | Performed by: INTERNAL MEDICINE

## 2024-03-08 PROCEDURE — 1159F MED LIST DOCD IN RCRD: CPT | Mod: CPTII,S$GLB,, | Performed by: INTERNAL MEDICINE

## 2024-03-08 PROCEDURE — 82310 ASSAY OF CALCIUM: CPT | Mod: HCNC | Performed by: INTERNAL MEDICINE

## 2024-03-08 PROCEDURE — 83970 ASSAY OF PARATHORMONE: CPT | Mod: HCNC | Performed by: INTERNAL MEDICINE

## 2024-03-08 NOTE — PATIENT INSTRUCTIONS
Do not take any calcium supplements/TUMS or hydrochlorothiazide.   Take calcium from diet - 2-3 servings of dairy daily.   Continue your vitamin D daily.  Avoid bending forwards at the waist and deep twisting.  Continue weight bearing exercises like walking and do light weights.  Take fall precautions.  Call if you have any new fractures.  Get regular dental visit.    Take about 64 oz of water daily - 6-8 glasses.     DXA scan will be due in 5/2025.

## 2024-03-08 NOTE — PROGRESS NOTES
ENDOCRINOLOGY CLINIC PATIENT NOTE    Subjective:      CC: Hyperparathyroidism and osteoporosis    HPI:   Turner Fernandes is a 78 y.o. male who presents for follow up of primary hyperparathyroidism and osteoporosis.  Previously seen Dr. Shannon/Dr. Krishnan on 03/16/2023.      Primary Hyperparathyroidism    Prior evaluation suggestive of primary hyperparathyroidism.  Had labs done after holding his hydrochlorothiazide 3 months    Had elevated calcium levels since 2019   Low 24 hour urine calcium.  Has osteoporosis on his DXA scan in 05/2023.  He had been recommended surgical management versus treatment for his osteoporosis.     Patient says he has not interested in surgery.  He also does not want to start osteoporosis medications at this time.    Supplements:  VitD 2000 IU daily  Ca None, does not take enough dairy.    Patient has history of alcohol use.  Was admitted in 01/2024 and has been off alcohol since his discharge.    No   Yes  [x]    []            Nephrolithiasis  [x]    []            Hypercalcuria (24 hour urine calcium level greater than 400 mg/dL)  [x]    []            Impaired renal function (GFR less than 60 mL/minute)  []    [x]            Osteoporosis (BDS less than -2.5, fragility fracture, or FRAX [>20% or >3% respectively])  []    [x]            Serum calcium level greater than or equal to 11.5       Neck Imaging:  Not done    Hx of malignancy:  Prostate cancer    Lab Results   Component Value Date    CALCIUM 11.9 (H) 02/01/2024    ALBUMIN 3.9 02/01/2024    EGFRNORACEVR >60.0 02/01/2024    PHOS 2.4 (L) 03/20/2023    ALKPHOS 61 02/01/2024    [id number] 42 03/09/2023    PTH 87.2 (H) 03/09/2023        Calcium, Urine   Date Value Ref Range Status   03/17/2023 <2.0 0.0 - 15.0 mg/dL Final     Calcium, 24 Hr Urine   Date Value Ref Range Status   03/28/2023 100 mg/24 h Final     Comment:                               Reference Range                               Low calcium diet   URINE VOLUME:  2000/24       Creatinine, 24H Ur   Date Value Ref Range Status   03/28/2023 1.40 0.50 - 2.15 g/24 h Final     Comment:     URINE VOLUME: 2000/24         Osteoporosis    Baseline T scores   Year Lumbar Spine Hip Femoral neck Distal 1/3 radius    5/2023 -1.6 -0.5 -1.8 -1.6       FRAX 10 year probability of fracture:   Major Osteoporotic Fracture: 8.3%   Hip Fracture: 3%    Previous/Current treatment:  Denies    History of previous fracture: No  Parent with fractured hip: No  Smoking status: No  Glucocorticoid use: No  History of RA: No  Alcohol 3 or more/day: Yes, last ETOH use 1-2 months back.      Nephrolithiasis: No  Diabetes: No  Frequent falls: No  Loss of height: No    Dental visit:  Every 6 months. No dental issues currently.    GERD: Yes on PPI      Family history:   Hyperparathyroidism: No  Osteoporosis: No    ROS: See HPI    Objective:     Physical Exam     /70 (BP Location: Right arm, Patient Position: Sitting, BP Method: Large (Automatic))   Pulse 81   Wt 71.5 kg (157 lb 10.1 oz)   SpO2 96%   BMI 25.44 kg/m²     Wt Readings from Last 3 Encounters:   03/08/24 71.5 kg (157 lb 10.1 oz)   02/01/24 74 kg (163 lb 2.3 oz)   09/11/23 73.8 kg (162 lb 11.2 oz)       Constitutional:  Pleasant, in no acute distress.   HENT:   Head:    Normocephalic and atraumatic.   Eyes:    EOMI. No scleral icterus.   Neck:    Supple  Cardiovascular:  Normal rate  Respiratory:   Effort normal  Neurological:  No tremor, normal speech  Skin:    Skin is warm, dry      LABORATORY REVIEW:      Chemistry        Component Value Date/Time     02/01/2024 1035    K 4.0 02/01/2024 1035     02/01/2024 1035    CO2 27 02/01/2024 1035    BUN 17 02/01/2024 1035    BUN 14.0 07/28/2023 0851    CREATININE 1.0 02/01/2024 1035    GLU 96 02/01/2024 1035        Component Value Date/Time    CALCIUM 11.9 (H) 02/01/2024 1035    ALKPHOS 61 02/01/2024 1035    AST 30 02/01/2024 1035    ALT 35 02/01/2024 1035    BILITOT 0.5 02/01/2024 1035     ESTGFRAFRICA >60.0 09/23/2019 0923    EGFRNONAA >60.0 09/23/2019 0923          See above HPI for other labs reviewed today      Assessment/Plan:     Problem List Items Addressed This Visit          Endocrine    Primary hyperparathyroidism       Evaluation showed primary hyperparathyroidism.  Patient meets criteria for surgery due to his osteoporosis and recent calcium of 11.9.  He declined surgical management and does not want a referral for to surgery clinic.    24 hour urine calcium was low.    Denies history of nephrolithiasis or fragility fractures.    Avoid calcium supplements/TUMS, medications like hydrochlorothiazide.   Continue his vitamin-D supplements and can take calcium from his diet.                  Age-related osteoporosis without current pathological fracture - Primary       Baseline T scores   Year Lumbar Spine Hip Femoral neck Distal 1/3 radius    5/2023 -1.6 -0.5 -1.8 -1.6     FRAX 10 year probability of fracture:   Major Osteoporotic Fracture: 8.3%   Hip Fracture: 3%    Osteoporosis based on his FRAX.  No history of fragility fractures.   I have discussed osteoporosis medications with the patient.  Patient understands his risk of fractures however declines starting osteoporosis treatment.    Encouraged safe movements and fall precautions.  Encouraged to avoid ETOH.  Continue routine dental visits  Instructed on Calcium and vitamin D   Labs ordered.                 Lorena Joyner MD

## 2024-03-10 PROBLEM — M81.0 AGE-RELATED OSTEOPOROSIS WITHOUT CURRENT PATHOLOGICAL FRACTURE: Status: ACTIVE | Noted: 2024-03-10

## 2024-03-10 NOTE — ASSESSMENT & PLAN NOTE
Baseline T scores   Year Lumbar Spine Hip Femoral neck Distal 1/3 radius    5/2023 -1.6 -0.5 -1.8 -1.6       FRAX 10 year probability of fracture:   Major Osteoporotic Fracture: 8.3%   Hip Fracture: 3%    Osteoporosis based on his FRAX.  No history of fragility fractures.   I have discussed osteoporosis medications with the patient.  Patient understands his risk of fractures however declines starting osteoporosis treatment.    Encouraged safe movements and fall precautions.  Encouraged to avoid ETOH.  Continue routine dental visits  Instructed on Calcium and vitamin D   Labs ordered.

## 2024-03-10 NOTE — ASSESSMENT & PLAN NOTE
Evaluation showed primary hyperparathyroidism.  Patient meets criteria for surgery due to his osteoporosis and recent calcium of 11.9.  He declined surgical management and does not want a referral for to surgery clinic.    24 hour urine calcium was low.    Denies history of nephrolithiasis or fragility fractures.    Avoid calcium supplements/TUMS, medications like hydrochlorothiazide.   Continue his vitamin-D supplements and can take calcium from his diet.

## 2024-03-11 ENCOUNTER — TELEPHONE (OUTPATIENT)
Dept: ENDOCRINOLOGY | Facility: CLINIC | Age: 79
End: 2024-03-11
Payer: MEDICARE

## 2024-03-11 DIAGNOSIS — E21.0 PRIMARY HYPERPARATHYROIDISM: Primary | ICD-10-CM

## 2024-03-11 NOTE — TELEPHONE ENCOUNTER
Called pt to relay info provided by Dr. Joyner. Pt verbalized understanding and has no further questions or concerns.    ----- Message from Lorena DEAN Rai, MD sent at 3/11/2024 10:01 AM CDT -----    He can take the vitamin-D 2000 units every other day.  I can send the lab orders electronically to [facility] and he can have it done in 6 months.    ----- Message -----  From: Kamilla Glover MA  Sent: 3/11/2024  10:00 AM CDT  To: Lorena DEAN Rai, MD    Called pt and he stated he just bought a bottle of the Vit D 2000 units and wants to know if he can just continue those or should he buy a new bottle of the 1000? Also, he has his labs done through [facility], so how should I proceed as far as scheduling labs? Just do a recall? Thanks!    Belia  ----- Message -----  From: Lorena Joyner MD  Sent: 3/11/2024   7:55 AM CDT  To: Ar DEAN Staff    Please call and inform patient that his calcium is still high has improved from last month. He can decrease his vitamin-D to 1000 units daily.  He should take adequate water to avoid any dehydration.    Please schedule his labs in 6 months.

## 2024-03-11 NOTE — TELEPHONE ENCOUNTER
Called pt to relay info from Dr. Joyner. Pt states he just went a bought a bottle of the Vitamin D 2000 units and wants to know if he can keep taking that. Also, he has his blood work done through [facility]. Pt has no other questions or concerns at this time.    ----- Message from Lorena DEAN Rai, MD sent at 3/11/2024  7:55 AM CDT -----  Please call and inform patient that his calcium is still high has improved from last month. He can decrease his vitamin-D to 1000 units daily.  He should take adequate water to avoid any dehydration.    Please schedule his labs in 6 months.

## 2024-03-18 ENCOUNTER — DOCUMENT SCAN (OUTPATIENT)
Dept: HOME HEALTH SERVICES | Facility: HOSPITAL | Age: 79
End: 2024-03-18
Payer: MEDICARE

## 2024-03-25 ENCOUNTER — HOSPITAL ENCOUNTER (OUTPATIENT)
Dept: RADIOLOGY | Facility: HOSPITAL | Age: 79
Discharge: HOME | End: 2024-03-25
Attending: ORTHOPAEDIC SURGERY
Payer: MEDICARE

## 2024-03-25 ENCOUNTER — OFFICE VISIT (OUTPATIENT)
Dept: SPORTS MEDICINE | Facility: CLINIC | Age: 79
End: 2024-03-25
Payer: MEDICARE

## 2024-03-25 VITALS
WEIGHT: 166.88 LBS | BODY MASS INDEX: 26.82 KG/M2 | DIASTOLIC BLOOD PRESSURE: 71 MMHG | HEIGHT: 66 IN | SYSTOLIC BLOOD PRESSURE: 120 MMHG | HEART RATE: 75 BPM

## 2024-03-25 DIAGNOSIS — M25.512 LEFT SHOULDER PAIN, UNSPECIFIED CHRONICITY: ICD-10-CM

## 2024-03-25 DIAGNOSIS — M25.512 LEFT SHOULDER PAIN, UNSPECIFIED CHRONICITY: Primary | ICD-10-CM

## 2024-03-25 PROCEDURE — 3078F DIAST BP <80 MM HG: CPT | Mod: HCNC,CPTII,S$GLB, | Performed by: ORTHOPAEDIC SURGERY

## 2024-03-25 PROCEDURE — 1125F AMNT PAIN NOTED PAIN PRSNT: CPT | Mod: HCNC,CPTII,S$GLB, | Performed by: ORTHOPAEDIC SURGERY

## 2024-03-25 PROCEDURE — 1159F MED LIST DOCD IN RCRD: CPT | Mod: HCNC,CPTII,S$GLB, | Performed by: ORTHOPAEDIC SURGERY

## 2024-03-25 PROCEDURE — 3074F SYST BP LT 130 MM HG: CPT | Mod: HCNC,CPTII,S$GLB, | Performed by: ORTHOPAEDIC SURGERY

## 2024-03-25 PROCEDURE — 73030 X-RAY EXAM OF SHOULDER: CPT | Mod: TC,HCNC,LT

## 2024-03-25 PROCEDURE — 99214 OFFICE O/P EST MOD 30 MIN: CPT | Mod: HCNC,S$GLB,, | Performed by: ORTHOPAEDIC SURGERY

## 2024-03-25 PROCEDURE — 73030 X-RAY EXAM OF SHOULDER: CPT | Mod: 26,HCNC,LT, | Performed by: RADIOLOGY

## 2024-03-25 PROCEDURE — 99999 PR PBB SHADOW E&M-EST. PATIENT-LVL III: CPT | Mod: PBBFAC,HCNC,, | Performed by: ORTHOPAEDIC SURGERY

## 2024-03-25 NOTE — PROGRESS NOTES
CC: left shoulder pain, patient is retired, referred by Dr. Gomez    Today:   Patient reports he is doing okay. Has been doing well with PT but continues to have pain intermittently. He states he would like to continue to avoid surgery if possible.      Previous hx 7/19/2023  78 y.o. Male RHD reports that the pain is severe and not responding to any conservative care.      Here today for follow up of MRI results     Pain has been present since January of 2023  Denies any injury or trauma or change in activity     He reports that the pain is worse with overhead activity. It also bothers him at night.  He notes pain with most movements and at times has to assist himself with his non involved arm when lifting    He describes his pain as anterior and posterior     Is affecting ADLs.     SANE: 50  Pain today 7/10    He notes a previous right shoulder rotator cuff repair back in 2015       Last shot lasted 1-2 weeks    Review of Systems   Constitution: Negative. Negative for chills, fever and night sweats.   HENT: Negative for congestion and headaches.    Eyes: Negative for blurred vision, left vision loss and right vision loss.   Cardiovascular: Negative for chest pain and syncope.   Respiratory: Negative for cough and shortness of breath.    Endocrine: Negative for polydipsia, polyphagia and polyuria.   Hematologic/Lymphatic: Negative for bleeding problem. Does not bruise/bleed easily.   Skin: Negative for dry skin, itching and rash.   Musculoskeletal: Negative for falls and muscle weakness.   Gastrointestinal: Negative for abdominal pain and bowel incontinence.   Genitourinary: Negative for bladder incontinence and nocturia.   Neurological: Negative for disturbances in coordination, loss of balance and seizures.   Psychiatric/Behavioral: Negative for depression. The patient does not have insomnia.    Allergic/Immunologic: Negative for hives and persistent infections.     PAST MEDICAL HISTORY:   Past Medical  History:   Diagnosis Date    Cancer     Hyperlipidemia     Hypertension      PAST SURGICAL HISTORY:   Past Surgical History:   Procedure Laterality Date    SHOULDER SURGERY       FAMILY HISTORY:   Family History   Problem Relation Age of Onset    Heart attack Mother      SOCIAL HISTORY:   Social History     Socioeconomic History    Marital status:    Tobacco Use    Smoking status: Never    Smokeless tobacco: Never   Substance and Sexual Activity    Alcohol use: Yes     Comment: social    Drug use: Never    Sexual activity: Not Currently       MEDICATIONS:   Current Outpatient Medications:     amLODIPine (NORVASC) 10 MG tablet, Take 1 tablet (10 mg total) by mouth once daily., Disp: 90 tablet, Rfl: 3    aspirin-calcium carbonate 81 mg-300 mg calcium(777 mg) Tab, aspirin 81 mg tablet  Take by oral route., Disp: , Rfl:     diazePAM (VALIUM) 2 MG tablet, Take 2 mg by mouth every 6 (six) hours as needed., Disp: , Rfl:     finasteride (PROSCAR) 5 mg tablet, Take 1 tablet (5 mg total) by mouth once daily., Disp: 90 tablet, Rfl: 3    folic acid (FOLVITE) 1 MG tablet, Take 1 tablet (1,000 mcg total) by mouth once daily., Disp: 90 tablet, Rfl: 3    irbesartan (AVAPRO) 300 MG tablet, TAKE 1 TABLET EVERY DAY, Disp: 90 tablet, Rfl: 10    magnesium oxide (MAG-OX) 400 mg (241.3 mg magnesium) tablet, Take 1 tablet (400 mg total) by mouth 2 (two) times daily., Disp: 180 tablet, Rfl: 3    multivitamin capsule, Take 1 capsule by mouth once daily., Disp: , Rfl:     omeprazole (PRILOSEC) 20 MG capsule, Take 1 capsule (20 mg total) by mouth once daily., Disp: 90 capsule, Rfl: 3    rosuvastatin (CRESTOR) 10 MG tablet, TAKE 1 TABLET EVERY DAY, Disp: 90 tablet, Rfl: 3    tamsulosin (FLOMAX) 0.4 mg Cap, Take 1 capsule (0.4 mg total) by mouth once daily., Disp: 90 capsule, Rfl: 3    thiamine 100 MG tablet, Take 1 tablet (100 mg total) by mouth once daily., Disp: 90 tablet, Rfl: 3    vit A/vit C/vit E/zinc/copper (ICAPS AREDS ORAL),  "Take by mouth., Disp: , Rfl:     vitamin D (VITAMIN D3) 1000 units Tab, Take 1,000 Units by mouth once daily. 2,000 iu daily, Disp: , Rfl:     sildenafiL (VIAGRA) 100 MG tablet, Take 1 tablet (100 mg total) by mouth daily as needed for Erectile Dysfunction., Disp: 10 tablet, Rfl: 1  ALLERGIES:   Review of patient's allergies indicates:   Allergen Reactions    Penicillins     Sulfa (sulfonamide antibiotics)     Sulfur Hives       VITAL SIGNS: /71   Pulse 75   Ht 5' 6" (1.676 m)   Wt 75.7 kg (166 lb 14.2 oz)   BMI 26.94 kg/m²      PHYSICAL EXAMINATION:  General:  The patient is alert and oriented x 3.  Mood is pleasant.  Observation of ears, eyes and nose reveal no gross abnormalities.  No labored breathing observed.  Gait is coordinated. Patient can toe walk and heel walk without difficulty.      left SHOULDER / UPPER EXTREMITY EXAM    OBSERVATION:     Swelling  none  Deformity  none   Discoloration  none   Scapular winging none   Scars   none  Atrophy  none    TENDERNESS / CREPITUS (T/C):          T/C      T/C   Clavicle   -/-  SUPRAspinatus    -/-     AC Jt.    -/-  INFRAspinatus  -/-    SC Jt.    -/-  Deltoid    -/-      G. Tuberosity  -/-  LH BICEP groove  +/-   Acromion:  -/-  Midline Neck   -/-     Scapular Spine -/-  Trapezium   -/-   SMA Scapula  -/-  GH jt. line - post  -/-     Scapulothoracic  -/-         ROM: (* = with pain)  Right shoulder   Left shoulder        AROM (PROM)   AROM (PROM)   FE    170° (175°)     70° (175°)     ER at 0°    40°  (45°)    30°  (35°)   ER at 90° ABD  90°  (90°)    90°  (90°)   IR at 90°  ABD   NA  (40°)     NA  (40°)      IR (spine level)   T10     T12    STRENGTH: (* = with pain) RIGHT SHOULDER  LEFT SHOULDER   SCAPTION at 0  5/5    3/5   SCAPTION at 30  5/5    4+/5    IR    5/5    5/5   ER    5/5    4/5   BICEPS   5/5    5/5   Deltoid    5/5    5/5     SIGNS:  Painful side       NEER   +    OLEONORS  +    LOPES   +    SPEEDS  +     DROP ARM   neg   BELLY " PRESS neg   Superior escape none    LIFT-OFF  neg   X-Body ADD    neg    MOVING VALGUS neg        STABILITY TESTING    RIGHT SHOULDER   LEFT SHOULDER     Translation     Anterior  up face     up face    Posterior  up face    up face    Sulcus   < 10mm    < 10 mm     Signs   Apprehension   neg      neg       Relocation   no change     no change      Jerk test  neg     neg    EXTREMITY NEURO-VASCULAR EXAM    Sensation grossly intact to light touch all dermatomal regions.    DTR 2+ Biceps, Triceps, BR and Negative Alyssas sign   Grossly intact motor function at Elbow, Wrist and Hand   Distal pulses radial and ulnar 2+, brisk cap refill, symmetric.      NECK:  Painless FROM and spinous processes non-tender. Negative Spurlings sign.      OTHER FINDINGS:  + scapular dyskinesia  Bear Hug: + for pain and weakness     XRAYS reviewed and interpreted personally by me:  Shoulder trauma series left,  were obtained and reviewed  Mild moderate DJD glenohumeral joint particularly inferiorly.  Hypertrophic change cortex anterior posterior humeral head and greater tuberosity region.  No fracture dislocation.  Small inferior spur formation at level of AC joint encroaching on sub acromial space.      MRI: Left shoulder reviewed and interpreted by myself:   Large rotator cuff tear including upper subscapularis, SLAP tear, and biceps tendonitis. After discussion with the patient he understands and would like to consider his options at this time.        ASSESSMENT:    Left Shoulder Rotator Cuff Tear, SLAP, biceps tendonitis.       he would benefit from an arthroscopy, given the above.         PLAN:   Left Shoulder rotator cuff tear      Patient would like to continue with conservative management at this time including physical therapy     Only 1-2 weeks relief with last shot.    Worsening motion, do not recommend tuberoplasty    ASSESSMENT:    Left Shoulder Rotator Cuff Tear Arthropathy.      he would benefit from reverse  shoulder  arthoplasty, given the above.     PLAN: We have discussed the surgery and recovery of shoulder arthroplasty surgery. he understands that there may be limited mobility up to several weeks after surgery depending on procedures that are performed at the time of surgery.    The spectrum of treatment options were discussed with the patient, including nonoperative and operative options.  After thorough discussion, the patient has elected to undergo surgical treatment to include:    left   a. Shoulder reverse shoulder arthroplasty   b. Shoulder arthroscopic biceps tenodesis    The details of the surgical procedure were explained, including the location of probable incisions and a description of likely hardware and/or grafts to be used.  The patient understands the likely convalescence after surgery.  Also, we have thoroughly discussed the risks, benefits and alternatives to surgery, including, but not limited to, the risk of infection, joint stiffness, blood clot (including DVT and/or pulmonary embolus), neurologic and vascular injury.  It was explained that, if tissue has been repaired or reconstructed, there is a chance of failure, which may require further management.      Will need new MRi prioe to surgery and CT

## 2024-07-10 ENCOUNTER — TELEPHONE (OUTPATIENT)
Dept: PRIMARY CARE CLINIC | Facility: CLINIC | Age: 79
End: 2024-07-10
Payer: MEDICARE

## 2024-07-10 DIAGNOSIS — E78.2 MIXED HYPERLIPIDEMIA: ICD-10-CM

## 2024-07-10 DIAGNOSIS — I10 BENIGN ESSENTIAL HYPERTENSION: Primary | ICD-10-CM

## 2024-07-10 NOTE — TELEPHONE ENCOUNTER
----- Message from Flor Chandra sent at 7/10/2024  9:33 AM CDT -----  Contact: [phone] PAtient  Type: Orders Request    What orders/ testing are being requested? Annual Labs faxed to [facility]    Is there a future appointment scheduled for the patient with PCP? Yes    When? 08/02/2024    Would you prefer a response via [employer] Call/portal message when orders have been faxed over please. Thank you    Comments:

## 2024-08-02 ENCOUNTER — OFFICE VISIT (OUTPATIENT)
Dept: PRIMARY CARE CLINIC | Facility: CLINIC | Age: 79
End: 2024-08-02
Payer: MEDICARE

## 2024-08-02 VITALS
HEIGHT: 66 IN | BODY MASS INDEX: 24.94 KG/M2 | DIASTOLIC BLOOD PRESSURE: 60 MMHG | TEMPERATURE: 98 F | WEIGHT: 155.19 LBS | SYSTOLIC BLOOD PRESSURE: 110 MMHG | RESPIRATION RATE: 18 BRPM | OXYGEN SATURATION: 96 % | HEART RATE: 57 BPM

## 2024-08-02 DIAGNOSIS — E83.42 HYPOMAGNESEMIA: ICD-10-CM

## 2024-08-02 DIAGNOSIS — E21.0 PRIMARY HYPERPARATHYROIDISM: ICD-10-CM

## 2024-08-02 DIAGNOSIS — M25.50 ARTHRALGIA, UNSPECIFIED JOINT: ICD-10-CM

## 2024-08-02 DIAGNOSIS — I10 BENIGN ESSENTIAL HYPERTENSION: ICD-10-CM

## 2024-08-02 DIAGNOSIS — Z85.46 HISTORY OF PROSTATE CANCER: Primary | ICD-10-CM

## 2024-08-02 PROCEDURE — 99999 PR PBB SHADOW E&M-EST. PATIENT-LVL IV: CPT | Mod: PBBFAC,,, | Performed by: INTERNAL MEDICINE

## 2024-08-02 RX ORDER — MELOXICAM 15 MG/1
15 TABLET ORAL DAILY
Qty: 30 TABLET | Refills: 1 | Status: SHIPPED | OUTPATIENT
Start: 2024-08-02

## 2024-08-02 NOTE — PROGRESS NOTES
[facility] Note    Chief Complaint      Chief Complaint   Patient presents with    Annual Exam       History of Present Illness      Turner Fernandes is a 78 y.o. male who presents today for   Chief Complaint   Patient presents with    Annual Exam   .  Patient comes to appointment here for checkup for chronic issues s sbelow . He has been etoh free since 2/24 . He had full labs done ordered by me . He will be seeing dr nunes soon for the hypercalcemia . He is complaint with meds .    HPI    No problem-specific Assessment & Plan notes found for this encounter.       Problem List Items Addressed This Visit          Cardiac/Vascular    Benign essential hypertension    Overview     bp well controlled on current regimen is off hctz             Renal/    Hypomagnesemia    Overview     Cont current               Oncology    History of prostate cancer - Primary    Overview     Cont current surveillance with dr malik             Endocrine    Primary hyperparathyroidism    Overview     Endo managing repeat calcium is lower but still elevated              Past Medical History:  Past Medical History:   Diagnosis Date    Cancer     Hyperlipidemia     Hypertension        Past Surgical History:  Past Surgical History:   Procedure Laterality Date    SHOULDER SURGERY         Family History:  family history includes Heart attack in his mother.     Social History:  Social History     Socioeconomic History    Marital status:    Tobacco Use    Smoking status: Never    Smokeless tobacco: Never   Substance and Sexual Activity    Alcohol use: Yes     Comment: social    Drug use: Never    Sexual activity: Not Currently       Review of Systems:   Review of Systems   Constitutional:  Negative for fever and weight loss.   HENT:  Negative for congestion, hearing loss and sore throat.    Eyes:  Negative for blurred vision.   Respiratory:  Negative for cough and shortness of breath.    Cardiovascular:  Negative  for chest pain, palpitations, claudication and leg swelling.   Gastrointestinal:  Negative for abdominal pain, constipation, diarrhea and heartburn.   Genitourinary:  Negative for dysuria.   Musculoskeletal:  Positive for joint pain. Negative for back pain and myalgias.   Skin:  Positive for itching.   Neurological:  Negative for focal weakness and headaches.   Psychiatric/Behavioral:  Negative for depression, memory loss and suicidal ideas. The patient is not nervous/anxious.         Medications:  Outpatient Encounter Medications as of 8/2/2024   Medication Sig Dispense Refill    amLODIPine (NORVASC) 10 MG tablet Take 1 tablet (10 mg total) by mouth once daily. 90 tablet 3    aspirin-calcium carbonate 81 mg-300 mg calcium(777 mg) Tab aspirin 81 mg tablet   Take by oral route.      diazePAM (VALIUM) 2 MG tablet Take 2 mg by mouth every 6 (six) hours as needed.      finasteride (PROSCAR) 5 mg tablet Take 1 tablet (5 mg total) by mouth once daily. 90 tablet 3    folic acid (FOLVITE) 1 MG tablet Take 1 tablet (1,000 mcg total) by mouth once daily. 90 tablet 3    irbesartan (AVAPRO) 300 MG tablet TAKE 1 TABLET EVERY DAY 90 tablet 10    magnesium oxide (MAG-OX) 400 mg (241.3 mg magnesium) tablet Take 1 tablet (400 mg total) by mouth 2 (two) times daily. 180 tablet 3    multivitamin capsule Take 1 capsule by mouth once daily.      omeprazole (PRILOSEC) 20 MG capsule Take 1 capsule (20 mg total) by mouth once daily. 90 capsule 3    rosuvastatin (CRESTOR) 10 MG tablet TAKE 1 TABLET EVERY DAY 90 tablet 3    sildenafiL (VIAGRA) 100 MG tablet Take 1 tablet (100 mg total) by mouth daily as needed for Erectile Dysfunction. 10 tablet 1    tamsulosin (FLOMAX) 0.4 mg Cap Take 1 capsule (0.4 mg total) by mouth once daily. 90 capsule 3    thiamine 100 MG tablet Take 1 tablet (100 mg total) by mouth once daily. 90 tablet 3    vit A/vit C/vit E/zinc/copper (ICAPS AREDS ORAL) Take by mouth.      vitamin D (VITAMIN D3) 1000 units Tab  "Take 1,000 Units by mouth once daily. 2,000 iu daily       No facility-administered encounter medications on file as of 8/2/2024.       Allergies:  Review of patient's allergies indicates:   Allergen Reactions    Penicillins     Sulfa (sulfonamide antibiotics)     Sulfur Hives         Physical Exam      Vitals:    08/02/24 1400   BP: 110/60   Pulse: (!) 57   Resp: 18   Temp: 98 °F (36.7 °C)        Vital Signs  Temp: 98 °F (36.7 °C)  Temp Source: Oral  Pulse: (!) 57  Resp: 18  SpO2: 96 %  BP: 110/60  BP Location: Right arm  Patient Position: Sitting  Pain Score: 0-No pain  Height and Weight  Height: 5' 6" (167.6 cm)  Weight: 70.4 kg (155 lb 3.3 oz)  BSA (Calculated - sq m): 1.81 sq meters  BMI (Calculated): 25.1  Weight in (lb) to have BMI = 25: 154.6]     Body mass index is 25.05 kg/m².    Physical Exam  Constitutional:       Appearance: He is well-developed.   HENT:      Head: Normocephalic.   Eyes:      Pupils: Pupils are equal, round, and reactive to light.   Neck:      Thyroid: No thyromegaly.   Cardiovascular:      Rate and Rhythm: Normal rate and regular rhythm.      Heart sounds: No murmur heard.     No friction rub. No gallop.   Pulmonary:      Effort: Pulmonary effort is normal.      Breath sounds: Normal breath sounds.   Abdominal:      General: Bowel sounds are normal.      Palpations: Abdomen is soft.   Musculoskeletal:         General: Normal range of motion.      Cervical back: Normal range of motion.   Skin:     General: Skin is warm and dry.   Neurological:      Mental Status: He is alert and oriented to person, place, and time.      Sensory: No sensory deficit.   Psychiatric:         Behavior: Behavior normal.          Laboratory:  CBC:  Recent Labs   Lab Result Units 07/22/24  0857   WBC Thousand/uL 5.7   RBC Million/uL 4.18*   Hemoglobin g/dL 13.2   Hematocrit % 41.3   Platelets Thousand/uL 269   MCV fL 98.8   MCH pg 31.6   MCHC g/dL 32.0     CMP:  Recent Labs   Lab Result Units 07/22/24  0857 " "  Glucose mg/dL 106*   Calcium mg/dL 11.2*   Albumin g/dL 4.4   Total Protein g/dL 7.1   Sodium mmol/L 138   Potassium mmol/L 5.1   CO2 mmol/L 29   Chloride mmol/L 102   BUN mg/dL 20   ALT U/L 10   AST U/L 14   Total Bilirubin mg/dL 0.6     URINALYSIS:  No results for input(s): "COLORU", "CLARITYU", "SPECGRAV", "PHUR", "PROTEINUA", "GLUCOSEU", "BILIRUBINCON", "BLOODU", "WBCU", "RBCU", "BACTERIA", "MUCUS", "NITRITE", "LEUKOCYTESUR", "UROBILINOGEN", "HYALINECASTS" in the last 2160 hours.   LIPIDS:  Recent Labs   Lab Result Units 07/22/24  0857   HDL mg/dL 79   Cholesterol mg/dL 243*   Triglycerides mg/dL 96   LDL Cholesterol mg/dL (calc) 144*   HDL/Cholesterol Ratio (calc) 3.1   Non HDL Chol. (LDL+VLDL) mg/dL (calc) 164*     TSH:  No results for input(s): "TSH" in the last 2160 hours.  A1C:  No results for input(s): "HGBA1C" in the last 2160 hours.    Radiology:        Assessment:     Turner Fernandes is a 78 y.o.male with:    History of prostate cancer    Hypomagnesemia    Benign essential hypertension    Primary hyperparathyroidism                Plan:     Problem List Items Addressed This Visit          Cardiac/Vascular    Benign essential hypertension    Overview     bp well controlled on current regimen is off hctz             Renal/    Hypomagnesemia    Overview     Cont current               Oncology    History of prostate cancer - Primary    Overview     Cont current surveillance with dr malik             Endocrine    Primary hyperparathyroidism    Overview     Endo managing repeat calcium is lower but still elevated             As above, continue current medications and maintain follow up with specialists.  Return to clinic in 6 months.      Frederick W Dantagnan [facility]                  "

## 2024-08-12 ENCOUNTER — PATIENT MESSAGE (OUTPATIENT)
Dept: ENDOCRINOLOGY | Facility: CLINIC | Age: 79
End: 2024-08-12
Payer: MEDICARE

## 2024-08-12 ENCOUNTER — TELEPHONE (OUTPATIENT)
Dept: ENDOCRINOLOGY | Facility: CLINIC | Age: 79
End: 2024-08-12
Payer: MEDICARE

## 2024-08-12 NOTE — TELEPHONE ENCOUNTER
----- Message from Lorena DEAN Rai, MD sent at 8/12/2024 12:42 PM CDT -----    I have resent the labs to [facility].  He should should get the labs done in 4 weeks.  ----- Message -----  From: Hiral Lyons MA  Sent: 8/7/2024   2:59 PM CDT  To: Lorena DEAN Rai, MD    Not sure what labs he is referring to, last labs were already drawn.  ----- Message -----  From: Jian Mark  Sent: 8/7/2024   1:58 PM CDT  To: Ar DEAN Staff    Name of Who is Calling:CORINA ALFONSO [[id number]]        What is the request in detail:Pt would like a callback from the office in regards to if he need to fast for lab work that was sent to [facility].Please advise thank you       Can the clinic reply by [facility]:        What Number to Call Back [facility]:Telephone Information:  Mobile          [phone]

## 2024-08-12 NOTE — TELEPHONE ENCOUNTER
----- Message from Nubia Le sent at 8/12/2024  3:40 PM CDT -----  Regarding: Appt requested  Contact: [phone]  Hi, pt called to request a call from the office to schedule an appt. Pls call the pt at  [phone].    Thank you.

## 2024-08-15 DIAGNOSIS — E21.0 PRIMARY HYPERPARATHYROIDISM: Primary | ICD-10-CM

## 2024-08-16 ENCOUNTER — TELEPHONE (OUTPATIENT)
Dept: ENDOCRINOLOGY | Facility: CLINIC | Age: 79
End: 2024-08-16
Payer: MEDICARE

## 2024-08-16 NOTE — TELEPHONE ENCOUNTER
Called and spoke to patient, provider recommendation and follow up visit given to patient.  Patient verbalized understanding.    ----- Message from Lorena DEAN Rai, MD sent at 8/15/2024 12:43 PM CDT -----  Please schedule patient for a follow-up appointment in the next 2-3 weeks. Stay well hydrated, take 6-8 glasses of water daily. He can reduce his vitamin-D 1000 units every other day.  Avoid any calcium supplements.    Please get a 24 hour urine calcium level done.

## 2024-08-21 ENCOUNTER — LAB VISIT (OUTPATIENT)
Dept: LAB | Facility: HOSPITAL | Age: 79
End: 2024-08-21
Attending: INTERNAL MEDICINE
Payer: MEDICARE

## 2024-08-21 DIAGNOSIS — E21.0 PRIMARY HYPERPARATHYROIDISM: ICD-10-CM

## 2024-08-21 LAB
CALCIUM 24H UR-MRATE: 5 MG/HR (ref 4–12)
CALCIUM UR-MCNC: 4.3 MG/DL (ref 0–15)
CALCIUM URINE (MG/SPEC): 125 MG/SPEC
CREAT 24H UR-MRATE: 50.8 MG/HR (ref 40–75)
CREAT UR-MCNC: 42 MG/DL (ref 23–375)
CREATININE, URINE (MG/SPEC): 1218 MG/SPEC
URINE COLLECTION DURATION: 24 HR
URINE COLLECTION DURATION: 24 HR
URINE VOLUME: 2900 ML
URINE VOLUME: 2900 ML

## 2024-08-21 PROCEDURE — 82340 ASSAY OF CALCIUM IN URINE: CPT | Mod: HCNC | Performed by: INTERNAL MEDICINE

## 2024-08-21 PROCEDURE — 82570 ASSAY OF URINE CREATININE: CPT | Mod: HCNC | Performed by: INTERNAL MEDICINE

## 2024-09-08 NOTE — PROGRESS NOTES
ENDOCRINOLOGY CLINIC PATIENT NOTE    Subjective:      CC: Hyperparathyroidism and osteoporosis    HPI:   Turner Fernandes is a 78 y.o. male who presents for follow up of primary hyperparathyroidism and osteoporosis.  Patient was last seen clinic on 03/08/2024.    Primary Hyperparathyroidism    Prior evaluation suggestive of primary hyperparathyroidism.  Had labs done after holding his hydrochlorothiazide 3 months    Had elevated calcium levels since 2019   Low 24 hour urine calcium.  Recent 24 hour urine calcium in 08/2024 was 125.  Has osteoporosis on his DXA scan in 05/2023.    He has been recommended surgical management but declined.   He also had not wanted to start osteoporosis medications.    Supplements:  VitD 1000 IU every other day.  Ca None, takes yogurt and cheese occasionally.     Patient has history of alcohol use.  Was admitted in 01/2024 and has been off alcohol since his discharge.  Drinks coffee, tea and a bottle of water [20 oz daily]    No   Yes  [x]    []            Nephrolithiasis  [x]    []            Hypercalcuria (24 hour urine calcium level greater than 400 mg/dL)  [x]    []            Impaired renal function (GFR less than 60 mL/minute)  []    [x]            Osteoporosis (BDS less than -2.5, fragility fracture, or FRAX [>20% or >3% respectively])  []    [x]            Serum calcium level greater than or equal to 11.5       Neck Imaging:  Not done    Hx of malignancy:  Prostate cancer s/p XRT. Follows up with Dr. H[facility].       Lab Results   Component Value Date    CALCIUM 11.3 (H) 08/12/2024    ALBUMIN 4.4 08/12/2024    EGFRNORACEVR 69 07/22/2024    PHOS 2.4 (L) 03/20/2023    ALKPHOS 61 02/01/2024    [id number] 58 03/08/2024    PTH 80.6 (H) 03/08/2024      Calcium, Urine   Date Value Ref Range Status   08/21/2024 4.3 0.0 - 15.0 mg/dL Final     Calcium, 24 Hr Urine   Date Value Ref Range Status   08/21/2024 5 4 - 12 mg/Hr Final     Creatinine, 24H Ur   Date Value Ref Range  Status   03/28/2023 1.40 0.50 - 2.15 g/24 h Final     Comment:     URINE VOLUME: 2000/24       Osteoporosis    Baseline T scores   Year Lumbar Spine Hip Femoral neck Distal 1/3 radius    5/2023 -1.6 -0.5 -1.8 -1.6       FRAX 10 year probability of fracture:   Major Osteoporotic Fracture: 8.3%   Hip Fracture: 3%    Previous/Current treatment:  Denies    History of previous fracture: No  Parent with fractured hip: No  Smoking status: No  Glucocorticoid use: No  History of RA: No  Alcohol 3 or more/day: Yes, last ETOH use 1-2 months back.      Nephrolithiasis: No  Diabetes: No  Frequent falls: No  Loss of height: No    Dental visit:  Every 6 months. No dental issues currently.    GERD: Yes on PPI    Exercise: Walks almost daily - 3 miles a day    Family history:   Hyperparathyroidism: No  Osteoporosis: No    ROS: See HPI    Objective:     Physical Exam     /75 (BP Location: Left arm, Patient Position: Sitting, BP Method: Medium (Automatic))   Pulse 75   Wt 71.4 kg (157 lb 6.5 oz)   BMI 25.41 kg/m²     Wt Readings from Last 3 Encounters:   09/09/24 71.4 kg (157 lb 6.5 oz)   08/02/24 70.4 kg (155 lb 3.3 oz)   03/25/24 75.7 kg (166 lb 14.2 oz)       Constitutional:  Pleasant, in no acute distress.   HENT:   Head:    Normocephalic and atraumatic.   Eyes:    EOMI. No scleral icterus.   Neck:    Supple  Cardiovascular:  Normal rate  Respiratory:   Effort normal  Neurological:  No tremor, normal speech  Skin:    Skin is warm, dry      LABORATORY REVIEW:      Chemistry        Component Value Date/Time     07/22/2024 0857    K 5.1 07/22/2024 0857     07/22/2024 0857    CO2 29 07/22/2024 0857    BUN 20 07/22/2024 0857    BUN 14.0 07/28/2023 0851    CREATININE 1.08 07/22/2024 0857     (H) 07/22/2024 0857        Component Value Date/Time    CALCIUM 11.3 (H) 08/12/2024 0816    ALKPHOS 61 02/01/2024 1035    AST 14 07/22/2024 0857    ALT 10 07/22/2024 0857    BILITOT 0.6 07/22/2024 0857    ESTGFRAFRICA  >60.0 09/23/2019 0923    EGFRNONAA >60.0 09/23/2019 0923          See above HPI for other labs reviewed today      Assessment/Plan:     Problem List Items Addressed This Visit          Endocrine    Primary hyperparathyroidism - Primary       Evaluation showed primary hyperparathyroidism.  Patient meets criteria for surgery due to his osteoporosis and one calcium level of 11.9.  He has previously declined surgical management.    24 hour urine calcium was 125.   Denies history of nephrolithiasis or fragility fractures.    Avoid calcium supplements/TUMS, medications like hydrochlorothiazide.   Continue his vitamin-D supplements and can take calcium from his diet.    Discussed consequences of primary hyperparathyroidism including severe hypercalcemia, kidney stones and osteoporosis.  Written information given to the patient.  Patient verbalized understanding but declines surgical management and referral to Endocrine surgery Clinic at this time.    Discussed monitoring his labs.  Discussed doing cinacalcet if he has severe hypercalcemia.  Recent calcium of 11.3.  Patient advised to keep well hydrated.           Relevant Orders    Vitamin D    PTH, Intact    Renal Function Panel    Age-related osteoporosis without current pathological fracture       Baseline T scores   Year Lumbar Spine Hip Femoral neck Distal 1/3 radius    5/2023 -1.6 -0.5 -1.8 -1.6       FRAX 10 year probability of fracture:   Major Osteoporotic Fracture: 8.3%   Hip Fracture: 3%    Osteoporosis based on his FRAX.  Denies history of fragility fractures.   I have discussed starting osteoporosis medications.  Patient declined injectable medication but wants to go ahead with the Fosamax once a week.  Has history of GERD on PPI, reports well control of his symptoms.  Discussed switching to Reclast infusion or Prolia if he has any side effects on the Fosamax.    Encouraged safe movements and fall precautions.  Encouraged to avoid ETOH.  Continue routine  dental visits  Instructed on Calcium and vitamin D                Follow up in about 6 months (around 3/9/2025).     Lorena Joyner MD

## 2024-09-09 ENCOUNTER — OFFICE VISIT (OUTPATIENT)
Dept: ENDOCRINOLOGY | Facility: CLINIC | Age: 79
End: 2024-09-09
Payer: MEDICARE

## 2024-09-09 VITALS
DIASTOLIC BLOOD PRESSURE: 75 MMHG | BODY MASS INDEX: 25.41 KG/M2 | WEIGHT: 157.44 LBS | SYSTOLIC BLOOD PRESSURE: 135 MMHG | HEART RATE: 75 BPM

## 2024-09-09 DIAGNOSIS — M81.0 AGE-RELATED OSTEOPOROSIS WITHOUT CURRENT PATHOLOGICAL FRACTURE: ICD-10-CM

## 2024-09-09 DIAGNOSIS — E21.0 PRIMARY HYPERPARATHYROIDISM: Primary | ICD-10-CM

## 2024-09-09 PROBLEM — C61 MALIGNANT NEOPLASM OF PROSTATE: Status: ACTIVE | Noted: 2024-09-09

## 2024-09-09 PROBLEM — K21.9 GASTROESOPHAGEAL REFLUX DISEASE WITHOUT ESOPHAGITIS: Status: ACTIVE | Noted: 2024-09-09

## 2024-09-09 PROCEDURE — 1160F RVW MEDS BY RX/DR IN RCRD: CPT | Mod: CPTII,S$GLB,, | Performed by: INTERNAL MEDICINE

## 2024-09-09 PROCEDURE — 3075F SYST BP GE 130 - 139MM HG: CPT | Mod: CPTII,S$GLB,, | Performed by: INTERNAL MEDICINE

## 2024-09-09 PROCEDURE — 99999 PR PBB SHADOW E&M-EST. PATIENT-LVL IV: CPT | Mod: PBBFAC,,, | Performed by: INTERNAL MEDICINE

## 2024-09-09 PROCEDURE — 1125F AMNT PAIN NOTED PAIN PRSNT: CPT | Mod: CPTII,S$GLB,, | Performed by: INTERNAL MEDICINE

## 2024-09-09 PROCEDURE — 1101F PT FALLS ASSESS-DOCD LE1/YR: CPT | Mod: CPTII,S$GLB,, | Performed by: INTERNAL MEDICINE

## 2024-09-09 PROCEDURE — 1159F MED LIST DOCD IN RCRD: CPT | Mod: CPTII,S$GLB,, | Performed by: INTERNAL MEDICINE

## 2024-09-09 PROCEDURE — 3288F FALL RISK ASSESSMENT DOCD: CPT | Mod: CPTII,S$GLB,, | Performed by: INTERNAL MEDICINE

## 2024-09-09 PROCEDURE — 3078F DIAST BP <80 MM HG: CPT | Mod: CPTII,S$GLB,, | Performed by: INTERNAL MEDICINE

## 2024-09-09 PROCEDURE — 99214 OFFICE O/P EST MOD 30 MIN: CPT | Mod: S$GLB,,, | Performed by: INTERNAL MEDICINE

## 2024-09-09 NOTE — PATIENT INSTRUCTIONS
Take alendronate (Fosamax) 70 milligrams one tablet each WEEK. Please take alendronate on an empty stomach with a full glass of water. After your weekly dose of alendronate, please do not eat, drink or take other medications for one hour and stay upright for at least 60 minutes in order to ensure passage of the pill to the intestine and adequate absorption.  Read the links below for more information regarding side-effects and how to take the medication.    Medication guide: https://www.[employer].com/product/usa/pi_circulars/f/fosamax/fosamax_mg.pdf     Don't take any calcium supplements or TUMS.   Take 6-8 glasses of water daily - keep well hydrated.   Take calcium daily from diet - 2-3 servings of dairy daily.  Continue your vitamin D.  Avoid bending forwards at the waist and deep twisting.  Continue weight bearing exercises like walking and do light weights.  Take fall precautions.  Call if you have any new fractures.   Get regular dental visit and let your dentist know that you are on osteoporosis medication.      Please see links below for further information:    Exercise/safe movement:  https://www.bonehealthandosteoporosis.org/patients/treatment/exercisesafe-movement/    Calcium content of common foods:  https://www.[facility].org/education/calcium-content-of-foods      Get labs done in 3 months.     Follow up in 6 months.

## 2024-09-09 NOTE — ASSESSMENT & PLAN NOTE
Baseline T scores   Year Lumbar Spine Hip Femoral neck Distal 1/3 radius    5/2023 -1.6 -0.5 -1.8 -1.6       FRAX 10 year probability of fracture:   Major Osteoporotic Fracture: 8.3%   Hip Fracture: 3%    Osteoporosis based on his FRAX.  Denies history of fragility fractures.   I have discussed starting osteoporosis medications.  Patient declined injectable medication but wants to go ahead with the Fosamax once a week.  Has history of GERD on PPI, reports well control of his symptoms.  Discussed switching to Reclast infusion or Prolia if he has any side effects on the Fosamax.    Encouraged safe movements and fall precautions.  Encouraged to avoid ETOH.  Continue routine dental visits  Instructed on Calcium and vitamin D

## 2024-09-09 NOTE — ASSESSMENT & PLAN NOTE
Evaluation showed primary hyperparathyroidism.  Patient meets criteria for surgery due to his osteoporosis and one calcium level of 11.9.  He has previously declined surgical management.    24 hour urine calcium was 125.   Denies history of nephrolithiasis or fragility fractures.    Avoid calcium supplements/TUMS, medications like hydrochlorothiazide.   Continue his vitamin-D supplements and can take calcium from his diet.    Discussed consequences of primary hyperparathyroidism including severe hypercalcemia, kidney stones and osteoporosis.  Written information given to the patient.  Patient verbalized understanding but declines surgical management and referral to Endocrine surgery Clinic at this time.    Discussed monitoring his labs.  Discussed doing cinacalcet if he has severe hypercalcemia.  Recent calcium of 11.3.  Patient advised to keep well hydrated.

## 2024-09-16 NOTE — TELEPHONE ENCOUNTER
Patient aware of labs    reports URI sx  - RVP positive for enterovirus, FLU/COVID neg  - supportive care

## 2024-09-27 ENCOUNTER — HOSPITAL ENCOUNTER (OUTPATIENT)
Dept: ENDOCRINOLOGY | Facility: CLINIC | Age: 79
Discharge: HOME | End: 2024-09-27
Attending: INTERNAL MEDICINE
Payer: MEDICARE

## 2024-09-27 DIAGNOSIS — E21.0 PRIMARY HYPERPARATHYROIDISM: ICD-10-CM

## 2024-09-27 PROCEDURE — 76536 US EXAM OF HEAD AND NECK: CPT | Mod: HCNC,S$GLB,, | Performed by: INTERNAL MEDICINE

## 2024-09-30 ENCOUNTER — PATIENT MESSAGE (OUTPATIENT)
Dept: ENDOCRINOLOGY | Facility: CLINIC | Age: 79
End: 2024-09-30
Payer: MEDICARE

## 2024-10-28 RX ORDER — ROSUVASTATIN CALCIUM 10 MG/1
TABLET, COATED ORAL
Qty: 90 TABLET | Refills: 2 | Status: SHIPPED | OUTPATIENT
Start: 2024-10-28

## 2024-12-11 ENCOUNTER — PATIENT MESSAGE (OUTPATIENT)
Dept: ENDOCRINOLOGY | Facility: CLINIC | Age: 79
End: 2024-12-11
Payer: MEDICARE

## 2024-12-11 ENCOUNTER — LAB VISIT (OUTPATIENT)
Dept: LAB | Facility: HOSPITAL | Age: 79
End: 2024-12-11
Attending: INTERNAL MEDICINE
Payer: MEDICARE

## 2024-12-11 DIAGNOSIS — E21.0 PRIMARY HYPERPARATHYROIDISM: ICD-10-CM

## 2024-12-11 LAB
25(OH)D3+25(OH)D2 SERPL-MCNC: 44 NG/ML (ref 30–96)
ALBUMIN SERPL BCP-MCNC: 4.1 G/DL (ref 3.5–5.2)
ANION GAP SERPL CALC-SCNC: 8 MMOL/L (ref 8–16)
BUN SERPL-MCNC: 13 MG/DL (ref 8–23)
CALCIUM SERPL-MCNC: 11.1 MG/DL (ref 8.7–10.5)
CHLORIDE SERPL-SCNC: 104 MMOL/L (ref 95–110)
CO2 SERPL-SCNC: 27 MMOL/L (ref 23–29)
CREAT SERPL-MCNC: 1 MG/DL (ref 0.5–1.4)
EST. GFR  (NO RACE VARIABLE): >60 ML/MIN/1.73 M^2
GLUCOSE SERPL-MCNC: 97 MG/DL (ref 70–110)
PHOSPHATE SERPL-MCNC: 2.8 MG/DL (ref 2.7–4.5)
POTASSIUM SERPL-SCNC: 5 MMOL/L (ref 3.5–5.1)
PTH-INTACT SERPL-MCNC: 94.8 PG/ML (ref 9–77)
SODIUM SERPL-SCNC: 139 MMOL/L (ref 136–145)

## 2024-12-11 PROCEDURE — 80069 RENAL FUNCTION PANEL: CPT | Mod: HCNC | Performed by: INTERNAL MEDICINE

## 2024-12-11 PROCEDURE — 36415 COLL VENOUS BLD VENIPUNCTURE: CPT | Performed by: INTERNAL MEDICINE

## 2024-12-11 PROCEDURE — 83970 ASSAY OF PARATHORMONE: CPT | Mod: HCNC | Performed by: INTERNAL MEDICINE

## 2024-12-11 PROCEDURE — 82306 VITAMIN D 25 HYDROXY: CPT | Mod: HCNC | Performed by: INTERNAL MEDICINE

## 2024-12-17 NOTE — TELEPHONE ENCOUNTER
----- Message from Joshua Starkey sent at 4/29/2022  2:45 PM CDT -----       Type: Patient Returning Call    Who Called: Pt  Who Left Message for Patient: NA  Does the patient know what this is regarding?: Patient needs medical advice. He says that he notices that he feels really lightheaded when he takes the medication hydroCHLOROthiazide (HYDRODIURIL) 12.5 MG Tab. He would like to speak with a member of the staff.     Would the patient rather a call back or a response via [facility]? Call  Best Call Back Number: [phone]  Additional Information:  Please assist, thank you!           [Negative] : Endocrine

## 2024-12-26 ENCOUNTER — TELEPHONE (OUTPATIENT)
Dept: PRIMARY CARE CLINIC | Facility: CLINIC | Age: 79
End: 2024-12-26
Payer: MEDICARE

## 2024-12-26 DIAGNOSIS — M25.561 CHRONIC PAIN OF RIGHT KNEE: Primary | ICD-10-CM

## 2024-12-26 DIAGNOSIS — G89.29 CHRONIC PAIN OF RIGHT KNEE: Primary | ICD-10-CM

## 2024-12-26 NOTE — TELEPHONE ENCOUNTER
C/o right knee pain and swelling, asking if we can place referral to ortho. Pended, can you sign for Dr TAVERAS pt?

## 2024-12-26 NOTE — TELEPHONE ENCOUNTER
----- Message from Flor sent at 12/26/2024  1:29 PM CST -----  Contact: [phone] Patient  Patient would like to get a referral.  Referral to what specialty:  Orthopedic  Does the patient want the referral with a specific physician:  No just someone at the [place] location if px  [facility]  Reason (be specific):  Knee issues, swelling,pain  Does the patient already have the specialty clinic appointment scheduled:  No  If yes, what date is the appointment scheduled:     Is the insurance listed in Epic correct? (this is important for a referral):  yes  Advised patient that once provider approves this either a nurse or  will return their call?: yes  Would the patient like a call back, or a response through their [facility] portal?:   Call back Please. Thank you  Comments:

## 2024-12-31 ENCOUNTER — TELEPHONE (OUTPATIENT)
Dept: SPORTS MEDICINE | Facility: CLINIC | Age: 79
End: 2024-12-31
Payer: MEDICARE

## 2024-12-31 NOTE — TELEPHONE ENCOUNTER
Spoke c pt. Offered earlier appt on 10/06/25. Pt accepted. Confirmed appt date, time, location. Pt will call c additional questions/concerns in interim. Pt expressed understanding & was thankful.

## 2025-01-02 NOTE — PROGRESS NOTES
CC: Right knee pain    79 y.o. Male who presents as a new patient to me. He is a retired. Complaint is right knee pain for a few months with an atraumatic onset. Pain localizes along medial joint line.  Reports mild swelling. No prominent mechanical symptoms. Denies instability. Worse with prolonged activity - prolonged standing and walking.  Denies any significant pain at rest.  He does have some mild stiffness in the morning.  Better with rest. Treatment thus far has included rest, activity modifications, oral medications including Meloxicam and home exercise program. Here today to discuss diagnosis and treatment options.      No ipsilateral groin or hip pain.    PMHx notable for HTN, HLD, prostate cancer.   Negative for tobacco.   Negative for diabetes. Last A1C: unknown    REVIEW OF SYSTEMS:   Constitution: Negative. Negative for chills, fever and night sweats.    Hematologic/Lymphatic: Negative for bleeding problem. Does not bruise/bleed easily.   Skin: Negative for dry skin, itching and rash.   Musculoskeletal: Negative for falls. Positive for right knee pain and muscle weakness.     All other review of symptoms were reviewed and found to be noncontributory.     PAST MEDICAL HISTORY:   Past Medical History:   Diagnosis Date    Cancer     Hyperlipidemia     Hypertension      PAST SURGICAL HISTORY:   Past Surgical History:   Procedure Laterality Date    SHOULDER SURGERY       FAMILY HISTORY:   Family History   Problem Relation Name Age of Onset    Heart attack Mother       SOCIAL HISTORY:   Social History     Socioeconomic History    Marital status:    Tobacco Use    Smoking status: Never    Smokeless tobacco: Never   Substance and Sexual Activity    Alcohol use: Yes     Comment: social    Drug use: Never    Sexual activity: Not Currently     Social Drivers of Health     Financial Resource Strain: Low Risk  (9/20/2024)    Overall Financial Resource Strain (CARDIA)     Difficulty of Paying  Living Expenses: Not very hard   Food Insecurity: No Food Insecurity (9/20/2024)    Hunger Vital Sign     Worried About Running Out of Food in the Last Year: Never true     Ran Out of Food in the Last Year: Never true   Physical Activity: Unknown (9/20/2024)    Exercise Vital Sign     Days of Exercise per Week: 6 days   Stress: No Stress Concern Present (9/20/2024)    [employer] - Occupational Stress Questionnaire     Feeling of Stress : Only a little   Housing Stability: Unknown (9/20/2024)    Housing Stability Vital Sign     Unable to Pay for Housing in the Last Year: No     MEDICATIONS:     Current Outpatient Medications:     alendronate (FOSAMAX) 70 MG tablet, Take 1 tablet (70 mg total) by mouth every 7 days. Take with large glass of water on empty stomach in the morning. Do not take anything else by mouth or lie flat for at least 1 hour after taking the medication., Disp: 4 tablet, Rfl: 11    amLODIPine (NORVASC) 10 MG tablet, Take 1 tablet (10 mg total) by mouth once daily., Disp: 90 tablet, Rfl: 3    aspirin-calcium carbonate 81 mg-300 mg calcium(777 mg) Tab, aspirin 81 mg tablet  Take by oral route., Disp: , Rfl:     celecoxib (CELEBREX) 200 MG capsule, Take 1 capsule (200 mg total) by mouth once daily., Disp: 30 capsule, Rfl: 1    diazePAM (VALIUM) 2 MG tablet, Take 2 mg by mouth every 6 (six) hours as needed., Disp: , Rfl:     finasteride (PROSCAR) 5 mg tablet, Take 1 tablet (5 mg total) by mouth once daily., Disp: 90 tablet, Rfl: 3    folic acid (FOLVITE) 1 MG tablet, Take 1 tablet (1,000 mcg total) by mouth once daily., Disp: 90 tablet, Rfl: 3    irbesartan (AVAPRO) 300 MG tablet, TAKE 1 TABLET EVERY DAY, Disp: 90 tablet, Rfl: 10    magnesium oxide (MAG-OX) 400 mg (241.3 mg magnesium) tablet, Take 1 tablet (400 mg total) by mouth 2 (two) times daily., Disp: 180 tablet, Rfl: 3    meloxicam (MOBIC) 15 MG tablet, Take 1 tablet (15 mg total) by mouth once daily.,  "Disp: 30 tablet, Rfl: 1    multivitamin capsule, Take 1 capsule by mouth once daily., Disp: , Rfl:     omeprazole (PRILOSEC) 20 MG capsule, Take 1 capsule (20 mg total) by mouth once daily., Disp: 90 capsule, Rfl: 3    rosuvastatin (CRESTOR) 10 MG tablet, TAKE 1 TABLET EVERY DAY, Disp: 90 tablet, Rfl: 2    sildenafiL (VIAGRA) 100 MG tablet, Take 1 tablet (100 mg total) by mouth daily as needed for Erectile Dysfunction., Disp: 10 tablet, Rfl: 1    tamsulosin (FLOMAX) 0.4 mg Cap, Take 1 capsule (0.4 mg total) by mouth once daily., Disp: 90 capsule, Rfl: 3    thiamine 100 MG tablet, Take 1 tablet (100 mg total) by mouth once daily., Disp: 90 tablet, Rfl: 3    vit A/vit C/vit E/zinc/copper (ICAPS AREDS ORAL), Take by mouth., Disp: , Rfl:     vitamin D (VITAMIN D3) 1000 units Tab, Take 1,000 Units by mouth once daily. 2,000 iu daily, Disp: , Rfl:     ALLERGIES:   Review of patient's allergies indicates:   Allergen Reactions    Penicillins     Sulfa (sulfonamide antibiotics)     Sulfur Hives      PHYSICAL EXAMINATION:  Ht 5' 6" (1.676 m)   Wt 72 kg (158 lb 11.7 oz)   BMI 25.62 kg/m²   General: Well-developed well-nourished 79 y.o. malein no acute distress   Cardiovascular: Regular rhythm by palpation of distal pulse, normal color and temperature, no concerning varicosities on symptomatic side   Lungs: No labored breathing or wheezing appreciated   Neuro: Alert and oriented ×3   Psychiatric: well oriented to person, place and time, demonstrates normal mood and affect   Skin: No rashes, lesions or ulcers, normal temperature, turgor, and texture on involved extremity    Ortho/SPM Exam  Examination of the right knee demonstrates intact extensor mechanism. No effusion or prepatellar swelling. Central patellar tracking. No patellar apprehension.  Reduced patellar mobility.  Positive patellar grind test.  Negative crepitus.  Pain with forced flexion and extension.  Full extension. Flexion to 120.  Pain at terminal " flexion.  Prominent tenderness and pain to palpation of the medial joint line.  Stable to varus/valgus stress testing at 0 and 30 deg.  No pain with passive internal and external rotation of the right hip.    IMAGING:  X-rays including standing, weight bearing AP and flexion bilateral knees, RIGHT knee lateral and sunrise views ordered and images reviewed by me show:    Tricompartmental degenerative changes most prominent within the medial compartment with some medial joint space narrowing. KL3.    ASSESSMENT:      ICD-10-CM ICD-9-CM   1. Primary osteoarthritis of right knee  M17.11 715.16   2. Chronic pain of right knee  M25.561 719.46    G89.29 338.29       PLAN:     Findings discussed with the patient.  Discussed both operative and nonoperative treatment options for knee DJD.  Conservative treatment to this point has been limited.  I have recommended a steroid injection.  That was given.  Prescription given for Celebrex.  Discontinue Mobic.  I would not recommend any surgical intervention at this time.  Return to clinic as needed.    Large Joint Aspiration/Injection: R knee    Date/Time: 1/6/2025 1:45 PM    Performed by: ELAN Kendrick MD  Authorized by: ELAN Kendrick MD    Consent Done?:  Yes (Verbal)  Indications:  Pain  Site marked: the procedure site was marked    Timeout: prior to procedure the correct patient, procedure, and site was verified    Prep: patient was prepped and draped in usual sterile fashion      Local anesthesia used?: Yes    Local anesthetic:  Co-phenylcaine spray (0.2% Naropin)  Anesthetic total (ml):  4      Details:  Needle Size:  22 G  Ultrasonic Guidance for needle placement?: No    Approach:  Lateral  Location:  Knee  Site:  R knee  Medications:  40 mg triamcinolone acetonide 40 mg/mL  Patient tolerance:  Patient tolerated the procedure well with no immediate complications

## 2025-01-06 ENCOUNTER — HOSPITAL ENCOUNTER (OUTPATIENT)
Dept: RADIOLOGY | Facility: HOSPITAL | Age: 80
Discharge: HOME | End: 2025-01-06
Attending: ORTHOPAEDIC SURGERY
Payer: MEDICARE

## 2025-01-06 ENCOUNTER — OFFICE VISIT (OUTPATIENT)
Dept: SPORTS MEDICINE | Facility: CLINIC | Age: 80
End: 2025-01-06
Payer: MEDICARE

## 2025-01-06 VITALS — BODY MASS INDEX: 25.51 KG/M2 | HEIGHT: 66 IN | WEIGHT: 158.75 LBS

## 2025-01-06 DIAGNOSIS — M25.561 CHRONIC PAIN OF RIGHT KNEE: ICD-10-CM

## 2025-01-06 DIAGNOSIS — M17.11 PRIMARY OSTEOARTHRITIS OF RIGHT KNEE: Primary | ICD-10-CM

## 2025-01-06 DIAGNOSIS — G89.29 CHRONIC PAIN OF RIGHT KNEE: ICD-10-CM

## 2025-01-06 PROCEDURE — 20610 DRAIN/INJ JOINT/BURSA W/O US: CPT | Mod: HCNC,RT,S$GLB, | Performed by: ORTHOPAEDIC SURGERY

## 2025-01-06 PROCEDURE — 73564 X-RAY EXAM KNEE 4 OR MORE: CPT | Mod: 26,HCNC,RT, | Performed by: RADIOLOGY

## 2025-01-06 PROCEDURE — 73562 X-RAY EXAM OF KNEE 3: CPT | Mod: TC,HCNC,LT

## 2025-01-06 PROCEDURE — 99214 OFFICE O/P EST MOD 30 MIN: CPT | Mod: 25,HCNC,S$GLB, | Performed by: ORTHOPAEDIC SURGERY

## 2025-01-06 PROCEDURE — 99999 PR PBB SHADOW E&M-EST. PATIENT-LVL II: CPT | Mod: PBBFAC,HCNC,, | Performed by: ORTHOPAEDIC SURGERY

## 2025-01-06 PROCEDURE — 73562 X-RAY EXAM OF KNEE 3: CPT | Mod: 26,HCNC,59,LT | Performed by: RADIOLOGY

## 2025-01-06 RX ORDER — CELECOXIB 200 MG/1
200 CAPSULE ORAL DAILY
Qty: 30 CAPSULE | Refills: 1 | Status: SHIPPED | OUTPATIENT
Start: 2025-01-06

## 2025-01-06 RX ORDER — TRIAMCINOLONE ACETONIDE 40 MG/ML
40 INJECTION, SUSPENSION INTRA-ARTICULAR; INTRAMUSCULAR
Status: DISCONTINUED | OUTPATIENT
Start: 2025-01-06 | End: 2025-01-06 | Stop reason: HOSPADM

## 2025-01-06 RX ADMIN — TRIAMCINOLONE ACETONIDE 40 MG: 40 INJECTION, SUSPENSION INTRA-ARTICULAR; INTRAMUSCULAR at 01:01

## 2025-01-15 DIAGNOSIS — F10.10 ALCOHOL ABUSE: ICD-10-CM

## 2025-01-15 RX ORDER — AMLODIPINE BESYLATE 10 MG/1
10 TABLET ORAL
Qty: 90 TABLET | Refills: 1 | Status: SHIPPED | OUTPATIENT
Start: 2025-01-15

## 2025-01-15 RX ORDER — IRBESARTAN 300 MG/1
TABLET ORAL
Qty: 90 TABLET | Refills: 1 | Status: SHIPPED | OUTPATIENT
Start: 2025-01-15

## 2025-01-15 RX ORDER — OMEPRAZOLE 20 MG/1
20 CAPSULE, DELAYED RELEASE ORAL
Qty: 90 CAPSULE | Refills: 1 | Status: SHIPPED | OUTPATIENT
Start: 2025-01-15

## 2025-01-15 NOTE — TELEPHONE ENCOUNTER
Care Due:                  Date            Visit Type   Department     Provider  --------------------------------------------------------------------------------                                EP -                              PRIMARY      [facility]  Last Visit: 08-      CARE ([facility])   ALLAN Gomez                              EP -                              PRIMARY      [facility] PRIMARY  Next Visit: 02-      CARE ([facility])   ALLAN Gomez                                                            Last  Test          Frequency    Reason                     Performed    Due Date  --------------------------------------------------------------------------------    Mg Level....  12 months..  magnesium................  02- 01-    [facility] Embedded Care Due Messages. Reference number: [id number].   1/15/2025 10:43:48 AM CST

## 2025-01-15 NOTE — TELEPHONE ENCOUNTER
Chief Complaint   Patient presents with     Consult     [place]ia     Tiara Stanley     Refill Routing Note   Medication(s) are not appropriate for processing by [facility] for the following reason(s):        Outside of protocol    [facility] action(s):  Approve  Route     Requires labs : Yes      Medication Therapy Plan: Prostate Cancer is on problem list      Appointments  past 12m or future 3m with PCP    Date Provider   Last Visit   8/2/2024 Tera Gomez MD   Next Visit   2/7/2025 Tera Gomez MD   ED visits in past 90 days: 0        Note composed:1:21 PM 01/15/2025

## 2025-01-16 RX ORDER — THIAMINE HCL 100 MG
100 TABLET ORAL
Qty: 90 TABLET | Refills: 3 | Status: SHIPPED | OUTPATIENT
Start: 2025-01-16

## 2025-01-16 RX ORDER — TAMSULOSIN HYDROCHLORIDE 0.4 MG/1
1 CAPSULE ORAL
Qty: 90 CAPSULE | Refills: 3 | Status: SHIPPED | OUTPATIENT
Start: 2025-01-16

## 2025-01-16 RX ORDER — FINASTERIDE 5 MG/1
5 TABLET, FILM COATED ORAL
Qty: 90 TABLET | Refills: 3 | Status: SHIPPED | OUTPATIENT
Start: 2025-01-16

## 2025-01-16 RX ORDER — LANOLIN ALCOHOL/MO/W.PET/CERES
1 CREAM (GRAM) TOPICAL 2 TIMES DAILY
Qty: 180 TABLET | Refills: 3 | Status: SHIPPED | OUTPATIENT
Start: 2025-01-16

## 2025-01-16 RX ORDER — FOLIC ACID 1 MG/1
1000 TABLET ORAL
Qty: 90 TABLET | Refills: 3 | Status: SHIPPED | OUTPATIENT
Start: 2025-01-16

## 2025-02-07 ENCOUNTER — OFFICE VISIT (OUTPATIENT)
Dept: PRIMARY CARE CLINIC | Facility: CLINIC | Age: 80
End: 2025-02-07
Payer: MEDICARE

## 2025-02-07 VITALS
WEIGHT: 158.94 LBS | SYSTOLIC BLOOD PRESSURE: 108 MMHG | RESPIRATION RATE: 18 BRPM | BODY MASS INDEX: 25.54 KG/M2 | OXYGEN SATURATION: 96 % | DIASTOLIC BLOOD PRESSURE: 80 MMHG | HEIGHT: 66 IN | HEART RATE: 68 BPM

## 2025-02-07 DIAGNOSIS — F10.10 ALCOHOL ABUSE: ICD-10-CM

## 2025-02-07 DIAGNOSIS — M81.0 AGE-RELATED OSTEOPOROSIS WITHOUT CURRENT PATHOLOGICAL FRACTURE: Primary | ICD-10-CM

## 2025-02-07 DIAGNOSIS — I10 BENIGN ESSENTIAL HYPERTENSION: ICD-10-CM

## 2025-02-07 DIAGNOSIS — E21.0 PRIMARY HYPERPARATHYROIDISM: ICD-10-CM

## 2025-02-07 DIAGNOSIS — M25.50 ARTHRALGIA, UNSPECIFIED JOINT: ICD-10-CM

## 2025-02-07 DIAGNOSIS — C61 MALIGNANT NEOPLASM OF PROSTATE: ICD-10-CM

## 2025-02-07 DIAGNOSIS — E78.2 MIXED HYPERLIPIDEMIA: ICD-10-CM

## 2025-02-07 PROCEDURE — 3079F DIAST BP 80-89 MM HG: CPT | Mod: CPTII,S$GLB,, | Performed by: INTERNAL MEDICINE

## 2025-02-07 PROCEDURE — 3288F FALL RISK ASSESSMENT DOCD: CPT | Mod: CPTII,S$GLB,, | Performed by: INTERNAL MEDICINE

## 2025-02-07 PROCEDURE — 99999 PR PBB SHADOW E&M-EST. PATIENT-LVL IV: CPT | Mod: PBBFAC,,, | Performed by: INTERNAL MEDICINE

## 2025-02-07 PROCEDURE — 1160F RVW MEDS BY RX/DR IN RCRD: CPT | Mod: CPTII,S$GLB,, | Performed by: INTERNAL MEDICINE

## 2025-02-07 PROCEDURE — 3074F SYST BP LT 130 MM HG: CPT | Mod: CPTII,S$GLB,, | Performed by: INTERNAL MEDICINE

## 2025-02-07 PROCEDURE — 1126F AMNT PAIN NOTED NONE PRSNT: CPT | Mod: CPTII,S$GLB,, | Performed by: INTERNAL MEDICINE

## 2025-02-07 PROCEDURE — 1101F PT FALLS ASSESS-DOCD LE1/YR: CPT | Mod: CPTII,S$GLB,, | Performed by: INTERNAL MEDICINE

## 2025-02-07 PROCEDURE — 1159F MED LIST DOCD IN RCRD: CPT | Mod: CPTII,S$GLB,, | Performed by: INTERNAL MEDICINE

## 2025-02-07 PROCEDURE — 99214 OFFICE O/P EST MOD 30 MIN: CPT | Mod: S$GLB,,, | Performed by: INTERNAL MEDICINE

## 2025-02-07 RX ORDER — MELOXICAM 15 MG/1
15 TABLET ORAL DAILY
Qty: 30 TABLET | Refills: 1 | Status: SHIPPED | OUTPATIENT
Start: 2025-02-07

## 2025-02-07 NOTE — PROGRESS NOTES
[facility] Note    Chief Complaint      Chief Complaint   Patient presents with    Follow-up     6m       History of Present Illness      Turner Fernandes is a 79 y.o. male who presents today for   Chief Complaint   Patient presents with    Follow-up     6m   .  Patient comes to appointment here for 6m checkup for chronic issues as below . He is complaint with all emds . He is seeing dr des dunbar as well . He is complaining of occasional left groin pain . He states today it is completely gone . He denies pain with lifting     HPI    No problem-specific Assessment & Plan notes found for this encounter.       Problem List Items Addressed This Visit       Benign essential hypertension    Overview     bp well controlled on current regimen is off hctz          Mixed hyperlipidemia    Overview     crestor working well cont same needs full labs next visit          Primary hyperparathyroidism    Overview     Endo managing repeat calcium is lower           Alcohol abuse    Overview     Has had no etoh since last suzi .          Age-related osteoporosis without current pathological fracture - Primary    Overview     stable         Malignant neoplasm of prostate    Overview     Noted by MING RAMIREZ MD  last documented on [id number]          Other Visit Diagnoses       Arthralgia, unspecified joint                 Past Medical History:  Past Medical History:   Diagnosis Date    Cancer     Hyperlipidemia     Hypertension        Past Surgical History:  Past Surgical History:   Procedure Laterality Date    SHOULDER SURGERY         Family History:  family history includes Heart attack in his mother.     Social History:  Social History     Socioeconomic History    Marital status:    Tobacco Use    Smoking status: Never    Smokeless tobacco: Never   Substance and Sexual Activity    Alcohol use: Yes     Comment: social    Drug use: Never    Sexual activity: Not Currently     Social Drivers of  Health     Financial Resource Strain: Low Risk  (9/20/2024)    Overall Financial Resource Strain (CARDIA)     Difficulty of Paying Living Expenses: Not very hard   Food Insecurity: No Food Insecurity (9/20/2024)    Hunger Vital Sign     Worried About Running Out of Food in the Last Year: Never true     Ran Out of Food in the Last Year: Never true   Physical Activity: Unknown (9/20/2024)    Exercise Vital Sign     Days of Exercise per Week: 6 days   Stress: No Stress Concern Present (9/20/2024)    [employer] - Occupational Stress Questionnaire     Feeling of Stress : Only a little   Housing Stability: Unknown (9/20/2024)    Housing Stability Vital Sign     Unable to Pay for Housing in the Last Year: No       Review of Systems:   Review of Systems   Constitutional:  Negative for fever and weight loss.   HENT:  Negative for congestion, hearing loss and sore throat.    Eyes:  Negative for blurred vision.   Respiratory:  Negative for cough and shortness of breath.    Cardiovascular:  Negative for chest pain, palpitations, claudication and leg swelling.   Gastrointestinal:  Negative for abdominal pain, constipation, diarrhea, heartburn, nausea and vomiting.   Genitourinary:  Negative for dysuria.   Musculoskeletal:  Negative for back pain and myalgias.   Skin:  Negative for rash.   Neurological:  Negative for focal weakness and headaches.   Psychiatric/Behavioral:  Negative for depression, memory loss and suicidal ideas. The patient is not nervous/anxious.         Medications:  Outpatient Encounter Medications as of 2/7/2025   Medication Sig Dispense Refill    alendronate (FOSAMAX) 70 MG tablet Take 1 tablet (70 mg total) by mouth every 7 days. Take with large glass of water on empty stomach in the morning. Do not take anything else by mouth or lie flat for at least 1 hour after taking the medication. 4 tablet 11    amLODIPine (NORVASC) 10 MG tablet TAKE 1 TABLET EVERY DAY 90 tablet 1     aspirin-calcium carbonate 81 mg-300 mg calcium(777 mg) Tab aspirin 81 mg tablet   Take by oral route.      diazePAM (VALIUM) 2 MG tablet Take 2 mg by mouth every 6 (six) hours as needed.      finasteride (PROSCAR) 5 mg tablet TAKE 1 TABLET EVERY DAY 90 tablet 3    folic acid (FOLVITE) 1 MG tablet TAKE 1 TABLET ONE TIME DAILY 90 tablet 3    irbesartan (AVAPRO) 300 MG tablet TAKE 1 TABLET EVERY DAY 90 tablet 1    magnesium oxide (MAG-OX) 400 mg (241.3 mg magnesium) tablet TAKE 1 TABLET TWICE DAILY 180 tablet 3    multivitamin capsule Take 1 capsule by mouth once daily.      omeprazole (PRILOSEC) 20 MG capsule TAKE 1 CAPSULE EVERY DAY 90 capsule 1    rosuvastatin (CRESTOR) 10 MG tablet TAKE 1 TABLET EVERY DAY 90 tablet 2    tamsulosin (FLOMAX) 0.4 mg Cap TAKE 1 CAPSULE EVERY DAY 90 capsule 3    thiamine (VITAMIN B-1) 100 MG tablet TAKE 1 TABLET ONE TIME DAILY 90 tablet 3    vit A/vit C/vit E/zinc/copper (ICAPS AREDS ORAL) Take by mouth.      vitamin D (VITAMIN D3) 1000 units Tab Take 1,000 Units by mouth once daily. 2,000 iu daily      [DISCONTINUED] celecoxib (CELEBREX) 200 MG capsule Take 1 capsule (200 mg total) by mouth once daily. 30 capsule 1    [DISCONTINUED] meloxicam (MOBIC) 15 MG tablet Take 1 tablet (15 mg total) by mouth once daily. 30 tablet 1    meloxicam (MOBIC) 15 MG tablet Take 1 tablet (15 mg total) by mouth once daily. 30 tablet 1    [DISCONTINUED] amLODIPine (NORVASC) 10 MG tablet Take 1 tablet (10 mg total) by mouth once daily. 90 tablet 3    [DISCONTINUED] finasteride (PROSCAR) 5 mg tablet Take 1 tablet (5 mg total) by mouth once daily. 90 tablet 3    [DISCONTINUED] folic acid (FOLVITE) 1 MG tablet Take 1 tablet (1,000 mcg total) by mouth once daily. 90 tablet 3    [DISCONTINUED] irbesartan (AVAPRO) 300 MG tablet TAKE 1 TABLET EVERY DAY 90 tablet 10    [DISCONTINUED] magnesium oxide (MAG-OX) 400 mg (241.3 mg magnesium) tablet Take 1 tablet (400 mg total) by mouth 2 (two) times daily. 180 tablet 3  "   [DISCONTINUED] omeprazole (PRILOSEC) 20 MG capsule Take 1 capsule (20 mg total) by mouth once daily. 90 capsule 3    [DISCONTINUED] sildenafiL (VIAGRA) 100 MG tablet Take 1 tablet (100 mg total) by mouth daily as needed for Erectile Dysfunction. 10 tablet 1    [DISCONTINUED] tamsulosin (FLOMAX) 0.4 mg Cap Take 1 capsule (0.4 mg total) by mouth once daily. 90 capsule 3    [DISCONTINUED] thiamine 100 MG tablet Take 1 tablet (100 mg total) by mouth once daily. 90 tablet 3     No facility-administered encounter medications on file as of 2/7/2025.       Allergies:  Review of patient's allergies indicates:   Allergen Reactions    Penicillins     Sulfa (sulfonamide antibiotics)     Sulfur Hives         Physical Exam      Vitals:    02/07/25 1353   BP: 108/80   Pulse: 68   Resp: 18        Vital Signs  Pulse: 68  Resp: 18  SpO2: 96 %  BP: 108/80  BP Location: Right arm  Patient Position: Sitting  Pain Score: 0-No pain  Height and Weight  Height: 5' 6" (167.6 cm)  Weight: 72.1 kg (158 lb 15.2 oz)  BSA (Calculated - sq m): 1.83 sq meters  BMI (Calculated): 25.7  Weight in (lb) to have BMI = 25: 154.6]     Body mass index is 25.66 kg/m².    Physical Exam  Constitutional:       Appearance: He is well-developed.   HENT:      Head: Normocephalic.   Eyes:      Pupils: Pupils are equal, round, and reactive to light.   Neck:      Thyroid: No thyromegaly.   Cardiovascular:      Rate and Rhythm: Normal rate and regular rhythm.      Heart sounds: No murmur heard.     No friction rub. No gallop.   Pulmonary:      Effort: Pulmonary effort is normal.      Breath sounds: Normal breath sounds.   Abdominal:      General: Bowel sounds are normal.      Palpations: Abdomen is soft.   Musculoskeletal:         General: Normal range of motion.      Cervical back: Normal range of motion.   Skin:     General: Skin is warm and dry.   Neurological:      Mental Status: He is alert and oriented to person, place, and time.      Sensory: No sensory " "deficit.   Psychiatric:         Behavior: Behavior normal.          Laboratory:  CBC:  No results for input(s): "WBC", "RBC", "HGB", "HCT", "PLT", "MCV", "MCH", "MCHC" in the last 2160 hours.  CMP:  Recent Labs   Lab Result Units 12/11/24  1041   Glucose mg/dL 97   Calcium mg/dL 11.1*   Albumin g/dL 4.1   Sodium mmol/L 139   Potassium mmol/L 5.0   CO2 mmol/L 27   Chloride mmol/L 104   BUN mg/dL 13     URINALYSIS:  No results for input(s): "COLORU", "CLARITYU", "SPECGRAV", "PHUR", "PROTEINUA", "GLUCOSEU", "BILIRUBINCON", "BLOODU", "WBCU", "RBCU", "BACTERIA", "MUCUS", "NITRITE", "LEUKOCYTESUR", "UROBILINOGEN", "HYALINECASTS" in the last 2160 hours.   LIPIDS:  No results for input(s): "TSH", "HDL", "CHOL", "TRIG", "LDLCALC", "CHOLHDL", "NONHDLCHOL", "TOTALCHOLEST" in the last 2160 hours.  TSH:  No results for input(s): "TSH" in the last 2160 hours.  A1C:  No results for input(s): "HGBA1C" in the last 2160 hours.    Radiology:        Assessment:     Turner Fernandes is a 79 y.o.male with:    Age-related osteoporosis without current pathological fracture    Malignant neoplasm of prostate    Mixed hyperlipidemia    Primary hyperparathyroidism    Benign essential hypertension    Alcohol abuse    Arthralgia, unspecified joint  -     meloxicam (MOBIC) 15 MG tablet; Take 1 tablet (15 mg total) by mouth once daily.  Dispense: 30 tablet; Refill: 1                Plan:     Problem List Items Addressed This Visit       Benign essential hypertension    Overview     bp well controlled on current regimen is off hctz          Mixed hyperlipidemia    Overview     crestor working well cont same needs full labs next visit          Primary hyperparathyroidism    Overview     Endo managing repeat calcium is lower           Alcohol abuse    Overview     Has had no etoh since last suzi .          Age-related osteoporosis without current pathological fracture - Primary    Overview     stable         Malignant neoplasm of prostate    Overview "     Noted by MING RAMIREZ MD  last documented on [id number]          Other Visit Diagnoses       Arthralgia, unspecified joint                As above, continue current medications and maintain follow up with specialists.  Return to clinic in 6 months.      Frederick W Dantagnan [facility]

## 2025-03-07 ENCOUNTER — LAB VISIT (OUTPATIENT)
Dept: LAB | Facility: HOSPITAL | Age: 80
End: 2025-03-07
Attending: INTERNAL MEDICINE
Payer: MEDICARE

## 2025-03-07 DIAGNOSIS — M81.0 AGE-RELATED OSTEOPOROSIS WITHOUT CURRENT PATHOLOGICAL FRACTURE: ICD-10-CM

## 2025-03-07 LAB
ALBUMIN SERPL BCP-MCNC: 4 G/DL (ref 3.5–5.2)
ANION GAP SERPL CALC-SCNC: 6 MMOL/L (ref 8–16)
BUN SERPL-MCNC: 14 MG/DL (ref 8–23)
CALCIUM SERPL-MCNC: 10.6 MG/DL (ref 8.7–10.5)
CHLORIDE SERPL-SCNC: 103 MMOL/L (ref 95–110)
CO2 SERPL-SCNC: 29 MMOL/L (ref 23–29)
CREAT SERPL-MCNC: 1.1 MG/DL (ref 0.5–1.4)
EST. GFR  (NO RACE VARIABLE): >60 ML/MIN/1.73 M^2
GLUCOSE SERPL-MCNC: 96 MG/DL (ref 70–110)
PHOSPHATE SERPL-MCNC: 1.9 MG/DL (ref 2.7–4.5)
POTASSIUM SERPL-SCNC: 4.4 MMOL/L (ref 3.5–5.1)
SODIUM SERPL-SCNC: 138 MMOL/L (ref 136–145)

## 2025-03-07 PROCEDURE — 80069 RENAL FUNCTION PANEL: CPT | Mod: HCNC | Performed by: INTERNAL MEDICINE

## 2025-03-07 PROCEDURE — 36415 COLL VENOUS BLD VENIPUNCTURE: CPT | Performed by: INTERNAL MEDICINE

## 2025-03-07 NOTE — PROGRESS NOTES
ENDOCRINOLOGY CLINIC PATIENT NOTE    Subjective:      CC: Hyperparathyroidism and osteoporosis    HPI:   Turner Fernandes is a 79 y.o. male who presents for follow up of primary hyperparathyroidism and osteoporosis.  Patient was last seen clinic on 09/09/2024.    Primary Hyperparathyroidism    Prior evaluation suggestive of primary hyperparathyroidism.  Had labs done after holding his hydrochlorothiazide 3 months    Had elevated calcium levels since 2019   Prior low 24 hour urine calcium.  24 hour urine calcium in 08/2024 was 125.  Has osteoporosis on his DXA scan in 05/2023.    He has been recommended surgical management but declined.   He also had previously not wanted to start osteoporosis medications.    Supplements:  VitD 1000 IU every other day.  Ca None, takes yogurt, ice cream and cheese occasionally.     Patient has history of alcohol use.  Was admitted in 01/2024 and has been off alcohol since his discharge.  Drinks coffee, tea and a bottle of water [20 oz daily]    No   Yes  [x]    []            Nephrolithiasis  [x]    []            Hypercalcuria (24 hour urine calcium level greater than 400 mg/dL)  [x]    []            Impaired renal function (GFR less than 60 mL/minute)  []    [x]            Osteoporosis (BDS less than -2.5, fragility fracture, or FRAX [>20% or >3% respectively])  []    [x]            Serum calcium level greater than or equal to 11.5       Neck Imaging:  Ultrasound 09/2024:    Impression:     1.)  Thyroid gland is normal in size with slightly heterogeneous echotexture and normal vascularity     2.) 1.1 x 0.8 x 0.8 cm solid, slightly hypoechoic nodule is seen in the right superior pole     3.) 0.6 x 0.6 x 0.7 cm solid, slightly hypoechoic nodule is seen in the left superior pole     4.)  A distinct parathyroid adenoma is not seen on today's exam     RECOMMENDATIONS:  Could consider correlation with 4D parathyroid protocol CT if parathyroidectomy is being considered.  Recommend repeat  thyroid ultrasound in 2-3 years to monitor thyroid nodules.    Hx of malignancy:  Prostate cancer s/p XRT. Follows up with Dr. Horta at [facility].       Lab Results   Component Value Date    CALCIUM 10.6 (H) 03/07/2025    ALBUMIN 4.0 03/07/2025    EGFRNORACEVR >60.0 03/07/2025    PHOS 1.9 (L) 03/07/2025    ALKPHOS 61 02/01/2024    [id number] 44 12/11/2024    PTH 94.8 (H) 12/11/2024      Calcium, Urine   Date Value Ref Range Status   08/21/2024 4.3 0.0 - 15.0 mg/dL Final     Calcium, 24 Hr Urine   Date Value Ref Range Status   08/21/2024 5 4 - 12 mg/Hr Final     Creatinine, 24H Ur   Date Value Ref Range Status   03/28/2023 1.40 0.50 - 2.15 g/24 h Final     Comment:     URINE VOLUME: 2000/24       Osteoporosis    Baseline T scores   Year Lumbar Spine Hip Femoral neck Distal 1/3 radius    5/2023 -1.6 -0.5 -1.8 -1.6       FRAX 10 year probability of fracture:   Major Osteoporotic Fracture: 8.3%   Hip Fracture: 3%    Previous treatment:  Denies  Current treatment:  Fosamax started in 09/2024.  Patient says he has not been taking the medication.    History of previous fracture: No  Parent with fractured hip: No  Smoking status: No  Glucocorticoid use: No  History of RA: No  Alcohol 3 or more/day: Yes, last ETOH use 1-2 months back.      Nephrolithiasis: No  Diabetes: No  Frequent falls: No  Loss of height: No    Dental visit:  Every 6 months. No dental issues currently.    GERD: Yes on PPI    Exercise: Walks almost daily - 3 miles a day    Family history:   Hyperparathyroidism: No  Osteoporosis: No      Thyroid nodules    Seen on his ultrasound in 09/2024 for evaluation of parathyroid adenoma.  Denies any compressive neck symptoms.    Prior FNA biopsy:  Denies.    Thyroid function test:  Normal    Neck symptoms:  Denies compressive neck symptoms.    Family history of thyroid cancer:  Denies      Lab Results   Component Value Date    TSH 2.27 01/18/2024       ROS: See HPI    Objective:     Physical Exam     BP  "108/65 (BP Location: Left arm, Patient Position: Sitting)   Pulse 61   Ht 5' 6" (1.676 m)   Wt 71.8 kg (158 lb 4.6 oz)   BMI 25.55 kg/m²     Wt Readings from Last 3 Encounters:   03/10/25 71.8 kg (158 lb 4.6 oz)   02/07/25 72.1 kg (158 lb 15.2 oz)   01/06/25 72 kg (158 lb 11.7 oz)       Constitutional:  Pleasant, in no acute distress.   HENT:   Eyes:    No scleral icterus.   Neck:    Supple  Cardiovascular:  Normal rate  Respiratory:   Effort normal  Neurological:  No tremor, normal speech  Skin:    Skin is warm, dry      LABORATORY REVIEW:      Chemistry        Component Value Date/Time     03/07/2025 0937    K 4.4 03/07/2025 0937     03/07/2025 0937    CO2 29 03/07/2025 0937    BUN 14 03/07/2025 0937    BUN 14.0 07/28/2023 0851    CREATININE 1.1 03/07/2025 0937    GLU 96 03/07/2025 0937        Component Value Date/Time    CALCIUM 10.6 (H) 03/07/2025 0937    ALKPHOS 61 02/01/2024 1035    AST 14 07/22/2024 0857    ALT 10 07/22/2024 0857    BILITOT 0.6 07/22/2024 0857    ESTGFRAFRICA >60.0 09/23/2019 0923    EGFRNONAA >60.0 09/23/2019 0923          See above HPI for other labs reviewed today      Assessment/Plan:     1. Primary hyperparathyroidism  Assessment & Plan:    Evaluation showed primary hyperparathyroidism.  Patient meets criteria for surgery due to his osteoporosis and one calcium level of 11.9.  US did not show a distinct parathyroid adenoma.     24 hour urine calcium was 125.   Denies history of nephrolithiasis or fragility fractures.    Avoid calcium supplements/TUMS, medications like hydrochlorothiazide.   Continue his vitamin-D supplements and can take calcium from his diet.    Discussed consequences of primary hyperparathyroidism including severe hypercalcemia, kidney stones and osteoporosis.  Written information given to the patient.  Patient verbalized understanding but declines surgical management and referral to Endocrine surgery Clinic at this time.    Discussed monitoring his " labs.  Discussed doing cinacalcet if he has severe hypercalcemia.  Recent calcium of 10.6.  Patient advised to keep well hydrated.        2. Age-related osteoporosis without current pathological fracture  Overview:  stable    Assessment & Plan:    Baseline T scores   Year Lumbar Spine Hip Femoral neck Distal 1/3 radius    5/2023 -1.6 -0.5 -1.8 -1.6       FRAX 10 year probability of fracture:   Major Osteoporotic Fracture: 8.3%   Hip Fracture: 3%    Osteoporosis based on his FRAX.  Denies history of fragility fractures.   Patient declined injectable medication but wants to go ahead with the Fosamax once a week.  Fosamax prescribed last visit but says he has not started taking the medication.  New prescriptions sent to the pharmacy.  Has history of GERD on PPI, reports well control of his symptoms.  Discussed switching to Reclast infusion or Prolia if he has any side effects on the Fosamax.    Encouraged safe movements and fall precautions.  Encouraged to avoid ETOH.  Continue routine dental visits  Call if any new fractures.  Instructed on Calcium and vitamin D   DXA scan due in 05/2025.       Orders:  -     DXA Bone Density Axial Skeleton 1 or more sites; Future; Expected date: 03/10/2025  -     alendronate (FOSAMAX) 70 MG tablet; Take 1 tablet (70 mg total) by mouth every 7 days. Take with large glass of water on empty stomach in the morning. Do not take anything else by mouth or lie flat for at least 1 hour after taking the medication.  Dispense: 4 tablet; Refill: 11    3. Multiple thyroid nodules  Assessment & Plan:    Ultrasound thyroid in 09/2024 showed a 1.1 cm right superior pole thyroid nodule and a 0.7 cm left superior pole nodule.  Recommend repeat ultrasound in 2-3 years.  Denies compressive neck symptoms.  Prior thyroid function test was normal.             Follow up in about 6 months (around 9/10/2025).     Lorena Joyner MD

## 2025-03-10 ENCOUNTER — OFFICE VISIT (OUTPATIENT)
Dept: ENDOCRINOLOGY | Facility: CLINIC | Age: 80
End: 2025-03-10
Payer: MEDICARE

## 2025-03-10 VITALS
HEIGHT: 66 IN | WEIGHT: 158.31 LBS | SYSTOLIC BLOOD PRESSURE: 108 MMHG | DIASTOLIC BLOOD PRESSURE: 65 MMHG | HEART RATE: 61 BPM | BODY MASS INDEX: 25.44 KG/M2

## 2025-03-10 DIAGNOSIS — E21.0 PRIMARY HYPERPARATHYROIDISM: Primary | ICD-10-CM

## 2025-03-10 DIAGNOSIS — E04.2 MULTIPLE THYROID NODULES: ICD-10-CM

## 2025-03-10 DIAGNOSIS — M81.0 AGE-RELATED OSTEOPOROSIS WITHOUT CURRENT PATHOLOGICAL FRACTURE: ICD-10-CM

## 2025-03-10 PROCEDURE — 1159F MED LIST DOCD IN RCRD: CPT | Mod: CPTII,S$GLB,, | Performed by: INTERNAL MEDICINE

## 2025-03-10 PROCEDURE — 1101F PT FALLS ASSESS-DOCD LE1/YR: CPT | Mod: CPTII,S$GLB,, | Performed by: INTERNAL MEDICINE

## 2025-03-10 PROCEDURE — 3078F DIAST BP <80 MM HG: CPT | Mod: CPTII,S$GLB,, | Performed by: INTERNAL MEDICINE

## 2025-03-10 PROCEDURE — 3288F FALL RISK ASSESSMENT DOCD: CPT | Mod: CPTII,S$GLB,, | Performed by: INTERNAL MEDICINE

## 2025-03-10 PROCEDURE — 99214 OFFICE O/P EST MOD 30 MIN: CPT | Mod: S$GLB,,, | Performed by: INTERNAL MEDICINE

## 2025-03-10 PROCEDURE — 99999 PR PBB SHADOW E&M-EST. PATIENT-LVL V: CPT | Mod: PBBFAC,,, | Performed by: INTERNAL MEDICINE

## 2025-03-10 PROCEDURE — 3074F SYST BP LT 130 MM HG: CPT | Mod: CPTII,S$GLB,, | Performed by: INTERNAL MEDICINE

## 2025-03-10 PROCEDURE — 1160F RVW MEDS BY RX/DR IN RCRD: CPT | Mod: CPTII,S$GLB,, | Performed by: INTERNAL MEDICINE

## 2025-03-10 PROCEDURE — 1125F AMNT PAIN NOTED PAIN PRSNT: CPT | Mod: CPTII,S$GLB,, | Performed by: INTERNAL MEDICINE

## 2025-03-10 RX ORDER — ALENDRONATE SODIUM 70 MG/1
70 TABLET ORAL
Qty: 4 TABLET | Refills: 11 | Status: SHIPPED | OUTPATIENT
Start: 2025-03-10

## 2025-03-10 NOTE — PATIENT INSTRUCTIONS
Start taking alendronate (Fosamax) 70 milligrams one tablet each WEEK for osteoporosis. Please take alendronate on an empty stomach with a full glass of water. After your weekly dose of alendronate, please do not eat, drink or take other medications for one hour and stay upright for at least 60 minutes in order to ensure passage of the pill to the intestine and adequate absorption.    On the days you take fosamax, take your calcium supplements in the afternoon instead of the morning. Read the links below for more information regarding side-effects and how to take the medication.    Medication guide: https://[email or web address]/product/usa/pi_circulars/f/fosamax/fosamax_mg.pdf           Take calcium total 1200 mg a day from diet. Don't take any calcium supplements.   Continue your vitamin D.   Avoid bending forwards at the waist and deep twisting.  Continue weight bearing exercises like walking and do light weights.  Take fall precautions.  Call if you have any new fractures.  Get regular dental visit and let your dentist know that you are on osteoporosis medication.  Keep well hydrated.      Please see links below for further information:    Exercise/safe movement:  https://www.bonehealthandosteoporosis.org/patients/treatment/exercisesafe-movement/    Calcium content of common foods:  https://www.[facility].org/education/calcium-content-of-foods

## 2025-03-17 PROBLEM — E04.2 MULTIPLE THYROID NODULES: Status: ACTIVE | Noted: 2025-03-17

## 2025-03-17 NOTE — ASSESSMENT & PLAN NOTE
Evaluation showed primary hyperparathyroidism.  Patient meets criteria for surgery due to his osteoporosis and one calcium level of 11.9.  US did not show a distinct parathyroid adenoma.     24 hour urine calcium was 125.   Denies history of nephrolithiasis or fragility fractures.    Avoid calcium supplements/TUMS, medications like hydrochlorothiazide.   Continue his vitamin-D supplements and can take calcium from his diet.    Discussed consequences of primary hyperparathyroidism including severe hypercalcemia, kidney stones and osteoporosis.  Written information given to the patient.  Patient verbalized understanding but declines surgical management and referral to Endocrine surgery Clinic at this time.    Discussed monitoring his labs.  Discussed doing cinacalcet if he has severe hypercalcemia.  Recent calcium of 10.6.  Patient advised to keep well hydrated.

## 2025-03-17 NOTE — ASSESSMENT & PLAN NOTE
Ultrasound thyroid in 09/2024 showed a 1.1 cm right superior pole thyroid nodule and a 0.7 cm left superior pole nodule.  Recommend repeat ultrasound in 2-3 years.  Denies compressive neck symptoms.  Prior thyroid function test was normal.

## 2025-03-17 NOTE — ASSESSMENT & PLAN NOTE
Baseline T scores   Year Lumbar Spine Hip Femoral neck Distal 1/3 radius    5/2023 -1.6 -0.5 -1.8 -1.6       FRAX 10 year probability of fracture:   Major Osteoporotic Fracture: 8.3%   Hip Fracture: 3%    Osteoporosis based on his FRAX.  Denies history of fragility fractures.   Patient declined injectable medication but wants to go ahead with the Fosamax once a week.  Fosamax prescribed last visit but says he has not started taking the medication.  New prescriptions sent to the pharmacy.  Has history of GERD on PPI, reports well control of his symptoms.  Discussed switching to Reclast infusion or Prolia if he has any side effects on the Fosamax.    Encouraged safe movements and fall precautions.  Encouraged to avoid ETOH.  Continue routine dental visits  Call if any new fractures.  Instructed on Calcium and vitamin D   DXA scan due in 05/2025.

## 2025-04-11 RX ORDER — OMEPRAZOLE 20 MG/1
20 CAPSULE, DELAYED RELEASE ORAL
Qty: 90 CAPSULE | Refills: 3 | Status: SHIPPED | OUTPATIENT
Start: 2025-04-11

## 2025-04-11 RX ORDER — AMLODIPINE BESYLATE 10 MG/1
10 TABLET ORAL
Qty: 90 TABLET | Refills: 3 | Status: SHIPPED | OUTPATIENT
Start: 2025-04-11

## 2025-04-11 RX ORDER — IRBESARTAN 300 MG/1
300 TABLET ORAL
Qty: 90 TABLET | Refills: 3 | Status: SHIPPED | OUTPATIENT
Start: 2025-04-11

## 2025-04-11 NOTE — TELEPHONE ENCOUNTER
Care Due:                  Date            Visit Type   Department     Provider  --------------------------------------------------------------------------------                                EP -                              PRIMARY      [facility]  Last Visit: 02-      CARE ([facility])   ALLAN Gomez                              EP -                              PRIMARY      [facility] PR[facility]  Next Visit: 08-      CARE ([facility])   ALLAN Gomez                                                            Last  Test          Frequency    Reason                     Performed    Due Date  --------------------------------------------------------------------------------    Mg Level....  12 months..  magnesium................  02- 01-    [facility] Embedded Care Due Messages. Reference number: [id number].   4/11/2025 11:01:07 AM CDT

## 2025-04-11 NOTE — TELEPHONE ENCOUNTER
Provider Staff:  Action required for this patient    Requires labs - Mg     Please see care gap opportunities below in Care Due Message.    Thanks!  [facility]     Appointments      Date Provider   Last Visit   2/7/2025 Tera Gomez MD   Next Visit   8/8/2025 Tera Gomez MD     Refill Decision Note   Turner Fernandes  is requesting a refill authorization.  Brief Assessment and Rationale for Refill:  Approve     Medication Therapy Plan:        Comments:     Note composed:12:34 PM 04/11/2025

## 2025-07-11 NOTE — TELEPHONE ENCOUNTER
Care Due:                  Date            Visit Type   Department     Provider  --------------------------------------------------------------------------------                                EP -                              PRIMARY      [facility]  Last Visit: 02-      CARE ([facility])   ALLAN Gomez                              EP -                              PRIMARY      [facility] PR[facility]  Next Visit: 08-      CARE ([facility])   ALLAN Gomez                                                            Last  Test          Frequency    Reason                     Performed    Due Date  --------------------------------------------------------------------------------    CBC.........  12 months..  meloxicam................  Not Found    Overdue    CMP.........  12 months..  meloxicam, rosuvastatin..  07- 07-    Lipid Panel.  12 months..  rosuvastatin.............  07- 07-    Mg Level....  12 months..  magnesium................  02- 01-    [facility] Embedded Care Due Messages. Reference number: [id number].   7/11/2025 2:59:30 PM CDT

## 2025-07-13 RX ORDER — ROSUVASTATIN CALCIUM 10 MG/1
10 TABLET, COATED ORAL
Qty: 90 TABLET | Refills: 0 | Status: SHIPPED | OUTPATIENT
Start: 2025-07-13

## 2025-07-13 NOTE — TELEPHONE ENCOUNTER
Provider Staff:  Action required for this patient    Requires labs      Please see care gap opportunities below in Care Due Message.    Thanks!  [facility]     Appointments      Date Provider   Last Visit   2/7/2025 Tera Gomez MD   Next Visit   8/8/2025 Tera Gomez MD     Refill Decision Note   Turner Fernandes  is requesting a refill authorization.  Brief Assessment and Rationale for Refill:  Approve     Medication Therapy Plan:         Comments:     Note composed:7:48 AM 07/13/2025

## 2025-08-06 ENCOUNTER — PATIENT OUTREACH (OUTPATIENT)
Dept: ADMINISTRATIVE | Facility: HOSPITAL | Age: 80
End: 2025-08-06
Payer: MEDICARE

## 2025-08-08 ENCOUNTER — OFFICE VISIT (OUTPATIENT)
Dept: PRIMARY CARE CLINIC | Facility: CLINIC | Age: 80
End: 2025-08-08
Payer: MEDICARE

## 2025-08-08 VITALS
DIASTOLIC BLOOD PRESSURE: 80 MMHG | SYSTOLIC BLOOD PRESSURE: 120 MMHG | RESPIRATION RATE: 18 BRPM | OXYGEN SATURATION: 99 % | HEART RATE: 83 BPM | WEIGHT: 158.31 LBS | HEIGHT: 66 IN | BODY MASS INDEX: 25.44 KG/M2

## 2025-08-08 DIAGNOSIS — I10 BENIGN ESSENTIAL HYPERTENSION: ICD-10-CM

## 2025-08-08 DIAGNOSIS — F10.10 ALCOHOL ABUSE: ICD-10-CM

## 2025-08-08 DIAGNOSIS — N40.0 BENIGN PROSTATIC HYPERPLASIA WITHOUT LOWER URINARY TRACT SYMPTOMS: ICD-10-CM

## 2025-08-08 DIAGNOSIS — E21.0 PRIMARY HYPERPARATHYROIDISM: ICD-10-CM

## 2025-08-08 DIAGNOSIS — E78.2 MIXED HYPERLIPIDEMIA: ICD-10-CM

## 2025-08-08 DIAGNOSIS — C61 MALIGNANT NEOPLASM OF PROSTATE: Primary | ICD-10-CM

## 2025-08-08 PROCEDURE — 99999 PR PBB SHADOW E&M-EST. PATIENT-LVL IV: CPT | Mod: PBBFAC,,, | Performed by: INTERNAL MEDICINE

## 2025-08-08 NOTE — PROGRESS NOTES
[facility] Note    Chief Complaint      Chief Complaint   Patient presents with    Annual Exam       History of Present Illness      Turner Fernandes is a 79 y.o. male who presents today for   Chief Complaint   Patient presents with    Annual Exam   .  Patient comes to appointment here for checkup for chronic issues as below . He states in the last few months has started wth occasional drink in evening . He is complaint with all meds and specilist f/u . Jsut ahd labs doen and reviewed today by me     HPI    No problem-specific Assessment & Plan notes found for this encounter.       Problem List Items Addressed This Visit       Benign essential hypertension    Overview   bp well controlled on current regimen           Mixed hyperlipidemia    Overview   crestor working well cont same          Benign prostatic hyperplasia without lower urinary tract symptoms    Overview   Cont Flomax  and proscar seeing dr malik         Primary hyperparathyroidism    Overview   Stable          Malignant neoplasm of prostate - Primary    Overview   Noted by MING RAMIREZ MD               Past Medical History:  Past Medical History:   Diagnosis Date    Cancer     Hyperlipidemia     Hypertension        Past Surgical History:  Past Surgical History:   Procedure Laterality Date    SHOULDER SURGERY         Family History:  family history includes Heart attack in his mother.     Social History:  Social History[1]    Review of Systems:   Review of Systems   Constitutional:  Negative for fever and weight loss.   HENT:  Negative for congestion, hearing loss and sore throat.    Eyes:  Negative for blurred vision.   Respiratory:  Negative for cough and shortness of breath.    Cardiovascular:  Negative for chest pain, palpitations, claudication and leg swelling.   Gastrointestinal:  Negative for abdominal pain, constipation, diarrhea and heartburn.   Genitourinary:  Negative for dysuria.   Musculoskeletal:  Negative  "for back pain and myalgias.   Skin:  Negative for rash.   Neurological:  Negative for focal weakness and headaches.   Psychiatric/Behavioral:  Negative for depression, memory loss and suicidal ideas. The patient is not nervous/anxious.         Medications:  Encounter Medications[2]    Allergies:  Review of patient's allergies indicates:   Allergen Reactions    Penicillins     Sulfa (sulfonamide antibiotics)     Sulfur Hives         Physical Exam      Vitals:    08/08/25 1352   BP: 120/80   Pulse: 83   Resp: 18        Vital Signs  Pulse: 83  Resp: 18  SpO2: 99 %  BP: 120/80  BP Location: Right arm  Patient Position: Sitting  Pain Score: 0-No pain  Height and Weight  Height: 5' 6" (167.6 cm)  Weight: 71.8 kg (158 lb 4.6 oz)  BSA (Calculated - sq m): 1.83 sq meters  BMI (Calculated): 25.6  Weight in (lb) to have BMI = 25: 154.6]     Body mass index is 25.55 kg/m².    Physical Exam  Constitutional:       Appearance: He is well-developed.   HENT:      Head: Normocephalic.   Eyes:      Pupils: Pupils are equal, round, and reactive to light.   Neck:      Thyroid: No thyromegaly.   Cardiovascular:      Rate and Rhythm: Normal rate and regular rhythm.      Heart sounds: Murmur heard.      Systolic murmur is present with a grade of 2/6.      No friction rub. No gallop.   Pulmonary:      Effort: Pulmonary effort is normal.      Breath sounds: Normal breath sounds.   Abdominal:      General: Bowel sounds are normal.      Palpations: Abdomen is soft.   Musculoskeletal:         General: Normal range of motion.      Cervical back: Normal range of motion.   Skin:     General: Skin is warm and dry.   Neurological:      Mental Status: He is alert and oriented to person, place, and time.      Sensory: No sensory deficit.   Psychiatric:         Behavior: Behavior normal.          Laboratory:  CBC:  No results for input(s): "WBC", "RBC", "HGB", "HCT", "PLT", "MCV", "MCH", "MCHC" in the last 2160 hours.  CMP:  No results for input(s): " ""GLU", "CALCIUM", "ALBUMIN", "PROT", "NA", "K", "CO2", "CL", "BUN", "ALKPHOS", "ALT", "AST", "BILITOT" in the last 2160 hours.    Invalid input(s): "CREATININ"  URINALYSIS:  No results for input(s): "COLORU", "CLARITYU", "SPECGRAV", "PHUR", "PROTEINUA", "GLUCOSEU", "BILIRUBINCON", "BLOODU", "WBCU", "RBCU", "BACTERIA", "MUCUS", "NITRITE", "LEUKOCYTESUR", "UROBILINOGEN", "HYALINECASTS" in the last 2160 hours.   LIPIDS:  No results for input(s): "TSH", "HDL", "CHOL", "TRIG", "LDLCALC", "CHOLHDL", "NONHDLCHOL", "TOTALCHOLEST" in the last 2160 hours.  TSH:  No results for input(s): "TSH" in the last 2160 hours.  A1C:  No results for input(s): "HGBA1C" in the last 2160 hours.    Radiology:        Assessment:     Turner Fernandes is a 79 y.o.male with:    Malignant neoplasm of prostate    Benign essential hypertension    Mixed hyperlipidemia    Benign prostatic hyperplasia without lower urinary tract symptoms    Primary hyperparathyroidism                Plan:     Problem List Items Addressed This Visit       Benign essential hypertension    Overview   bp well controlled on current regimen           Mixed hyperlipidemia    Overview   crestor working well cont same          Benign prostatic hyperplasia without lower urinary tract symptoms    Overview   Cont Flomax  and proscar seeing dr malik         Primary hyperparathyroidism    Overview   Stable          Malignant neoplasm of prostate - Primary    Overview   Noted by MING RAMIREZ MD              As above, continue current medications and maintain follow up with specialists.  Return to clinic in 6 months.      [facility]er[facility] W Dantagnan [facility]                       [1]   Social History  Socioeconomic History    Marital status:    Tobacco Use    Smoking status: Never    Smokeless tobacco: Never   Substance and Sexual Activity    Alcohol use: Not Currently     Comment: social    Drug use: Never    Sexual activity: Not " Currently     Social Drivers of Health     Financial Resource Strain: Low Risk  (9/20/2024)    Overall Financial Resource Strain (CARDIA)     Difficulty of Paying Living Expenses: Not very hard   Food Insecurity: No Food Insecurity (9/20/2024)    Hunger Vital Sign     Worried About Running Out of Food in the Last Year: Never true     Ran Out of Food in the Last Year: Never true   Physical Activity: Unknown (9/20/2024)    Exercise Vital Sign     Days of Exercise per Week: 6 days   Stress: No Stress Concern Present (9/20/2024)    [employer] - Occupational Stress Questionnaire     Feeling of Stress : Only a little   Housing Stability: Unknown (9/20/2024)    Housing Stability Vital Sign     Unable to Pay for Housing in the Last Year: No   [2]   Outpatient Encounter Medications as of 8/8/2025   Medication Sig Dispense Refill    amLODIPine (NORVASC) 10 MG tablet TAKE 1 TABLET EVERY DAY 90 tablet 3    aspirin-calcium carbonate 81 mg-300 mg calcium(777 mg) Tab aspirin 81 mg tablet   Take by oral route.      finasteride (PROSCAR) 5 mg tablet TAKE 1 TABLET EVERY DAY 90 tablet 3    folic acid (FOLVITE) 1 MG tablet TAKE 1 TABLET ONE TIME DAILY 90 tablet 3    irbesartan (AVAPRO) 300 MG tablet TAKE 1 TABLET EVERY DAY 90 tablet 3    magnesium oxide (MAG-OX) 400 mg (241.3 mg magnesium) tablet TAKE 1 TABLET TWICE DAILY 180 tablet 3    meloxicam (MOBIC) 15 MG tablet Take 1 tablet (15 mg total) by mouth once daily. 30 tablet 1    multivitamin capsule Take 1 capsule by mouth once daily.      omeprazole (PRILOSEC) 20 MG capsule TAKE 1 CAPSULE EVERY DAY 90 capsule 3    rosuvastatin (CRESTOR) 10 MG tablet TAKE 1 TABLET EVERY DAY 90 tablet 0    tamsulosin (FLOMAX) 0.4 mg Cap TAKE 1 CAPSULE EVERY DAY 90 capsule 3    thiamine (VITAMIN B-1) 100 MG tablet TAKE 1 TABLET ONE TIME DAILY 90 tablet 3    vit A/vit C/vit E/zinc/copper (ICAPS AREDS ORAL) Take by mouth.      vitamin D (VITAMIN D3) 1000 units Tab Take 1,000  Units by mouth once daily. 2,000 iu daily      [DISCONTINUED] alendronate (FOSAMAX) 70 MG tablet Take 1 tablet (70 mg total) by mouth every 7 days. Take with large glass of water on empty stomach in the morning. Do not take anything else by mouth or lie flat for at least 1 hour after taking the medication. 4 tablet 11    [DISCONTINUED] diazePAM (VALIUM) 2 MG tablet Take 2 mg by mouth every 6 (six) hours as needed.      [DISCONTINUED] rosuvastatin (CRESTOR) 10 MG tablet TAKE 1 TABLET EVERY DAY 90 tablet 2     No facility-administered encounter medications on file as of 8/8/2025.